# Patient Record
Sex: MALE | Race: WHITE | NOT HISPANIC OR LATINO | Employment: UNEMPLOYED | URBAN - NONMETROPOLITAN AREA
[De-identification: names, ages, dates, MRNs, and addresses within clinical notes are randomized per-mention and may not be internally consistent; named-entity substitution may affect disease eponyms.]

---

## 2017-01-03 ENCOUNTER — OFFICE VISIT (OUTPATIENT)
Dept: PSYCHIATRY | Facility: CLINIC | Age: 72
End: 2017-01-03

## 2017-01-03 DIAGNOSIS — F41.9 ANXIETY DISORDER, UNSPECIFIED TYPE: Primary | ICD-10-CM

## 2017-01-03 PROCEDURE — 90837 PSYTX W PT 60 MINUTES: CPT | Performed by: SOCIAL WORKER

## 2017-01-03 NOTE — MR AVS SNAPSHOT
Rose Singer   1/3/2017 1:45 PM   Office Visit    Dept Phone:  [phone]   Encounter #:  [id number]    Provider:  Tiana Black LCSW   Department:  [facility] BEHAVIORAL HEALTH                Your Full Care Plan              Your Updated Medication List          This list is accurate as of: 1/3/17  4:21 PM.  Always use your most recent med list.                aspirin 325 MG tablet       atorvastatin 10 MG tablet   Commonly known as:  LIPITOR   Take 1 tablet by mouth daily.       cholecalciferol 1000 UNITS tablet   Commonly known as:  VITAMIN D3       clonazePAM 0.5 MG tablet   Commonly known as:  KlonoPIN   Take 1 tablet by mouth 2 (Two) Times a Day As Needed for anxiety.       * FLUoxetine 40 MG capsule   Commonly known as:  PROzac   Take 1 capsule by mouth Daily.       * FLUoxetine 20 MG capsule   Commonly known as:  PROzac   Take 1 capsule by mouth Daily.       glucose blood test strip       Lancets misc       lisinopril 5 MG tablet   Commonly known as:  PRINIVIL,ZESTRIL   Take 1 tablet by mouth Daily.       nitroglycerin 0.4 MG SL tablet   Commonly known as:  NITROSTAT       vitamin B-12 1000 MCG tablet   Commonly known as:  CYANOCOBALAMIN       ZANTAC 300 MG tablet   Generic drug:  raNITIdine       * Notice:  This list has 2 medication(s) that are the same as other medications prescribed for you. Read the directions carefully, and ask your doctor or other care provider to review them with you.            Instructions     None    Patient Instructions History      Upcoming Appointments     Visit Type Date Time Department    PSYCHOTHERAPY 1/3/2017  1:45 PM [facility]    LAB 1/18/2017  8:40 AM [facility] [place]    FOLLOW UP 1/19/2017  2:30 PM [facility] [place]    PSYCHOTHERAPY 1/19/2017  8:30 AM [facility]    MEDICINE CHECK 1/25/2017  9:20 AM [facility]    FOLLOW UP 2/9/2017 10:00 AM [facility] [place] Signup     Our records  indicate that you have an active [facility] [employer] account.    You can view your After Visit Summary by going to [employer] and logging in with your [employer] username and password.  If you don't have a [employer] username and password but a parent or guardian has access to your record, the parent or guardian should login with their own [employer] username and password and access your record to view the After Visit Summary.    If you have questions, you can email [email or web address] or call [phone] to talk to our [employer] staff.  Remember, [employer] is NOT to be used for urgent needs.  For medical emergencies, dial 911.               Other Info from Your Visit           Your Appointments     Jan 18, 2017  8:40 AM EST   Lab with LABWORK, CARD COR   [facility] CARDIOLOGY (--)    [address]  [place] [address]   [phone]            Jan 19, 2017  8:30 AM EST   Psychotherapy with Tiana Black LCSW   [facility] BEHAVIORAL HEALTH (--)    [address]  [place] [address]   [phone]            Jan 19, 2017  2:30 PM EST   Follow Up with Adryan Rodney MD   [facility] CARDIOLOGY (--)    [address]  [place] [address]   [phone]           Arrive 15 minutes prior to appointment.            Jan 25, 2017  9:20 AM EST   Medicine Check with ITALO Alfonso   [facility] BEHAVIORAL HEALTH (--)    [address]  [email or web address] [address]   [phone]            Feb 09, 2017 10:00 AM EST   Follow Up with VY Page MD   [facility] PULMONOLOGY CRITICAL CARE (--)    [address] [address]  [place] [address]   [phone]           Arrive 15 minutes prior to appointment.              Allergies     Penicillins      Sulfa Antibiotics        Vital Signs     Smoking Status                   Former Smoker

## 2017-01-18 ENCOUNTER — LAB (OUTPATIENT)
Dept: CARDIOLOGY | Facility: CLINIC | Age: 72
End: 2017-01-18

## 2017-01-18 DIAGNOSIS — E11.9 TYPE 2 DIABETES MELLITUS WITHOUT COMPLICATION (HCC): ICD-10-CM

## 2017-01-18 DIAGNOSIS — E78.5 HYPERLIPIDEMIA: ICD-10-CM

## 2017-01-18 DIAGNOSIS — I25.10 CORONARY ARTERY DISEASE INVOLVING NATIVE CORONARY ARTERY OF NATIVE HEART WITHOUT ANGINA PECTORIS: ICD-10-CM

## 2017-01-18 LAB
ALBUMIN SERPL-MCNC: 4.3 G/DL (ref 3.4–4.8)
ALBUMIN UR-MCNC: 15.2 MG/L
ALBUMIN/GLOB SERPL: 1.6 G/DL (ref 1.5–2.5)
ALP SERPL-CCNC: 50 U/L (ref 46–116)
ALT SERPL W P-5'-P-CCNC: 24 U/L (ref 10–44)
ANION GAP SERPL CALCULATED.3IONS-SCNC: 5.9 MMOL/L (ref 3.6–11.2)
AST SERPL-CCNC: 22 U/L (ref 10–34)
BASOPHILS # BLD AUTO: 0.03 10*3/MM3 (ref 0–0.3)
BASOPHILS NFR BLD AUTO: 0.4 % (ref 0–2)
BILIRUB SERPL-MCNC: 0.7 MG/DL (ref 0.2–1.8)
BUN BLD-MCNC: 11 MG/DL (ref 7–21)
BUN/CREAT SERPL: 10.9 (ref 7–25)
CALCIUM SPEC-SCNC: 9.8 MG/DL (ref 7.7–10)
CHLORIDE SERPL-SCNC: 107 MMOL/L (ref 99–112)
CHOLEST SERPL-MCNC: 146 MG/DL (ref 0–200)
CK SERPL-CCNC: 76 U/L (ref 24–204)
CO2 SERPL-SCNC: 32.1 MMOL/L (ref 24.3–31.9)
CREAT BLD-MCNC: 1.01 MG/DL (ref 0.43–1.29)
DEPRECATED RDW RBC AUTO: 43 FL (ref 37–54)
EOSINOPHIL # BLD AUTO: 0.16 10*3/MM3 (ref 0–0.7)
EOSINOPHIL NFR BLD AUTO: 2.3 % (ref 0–7)
ERYTHROCYTE [DISTWIDTH] IN BLOOD BY AUTOMATED COUNT: 13.4 % (ref 11.5–14.5)
GFR SERPL CREATININE-BSD FRML MDRD: 73 ML/MIN/1.73
GLOBULIN UR ELPH-MCNC: 2.7 GM/DL
GLUCOSE BLD-MCNC: 136 MG/DL (ref 70–110)
HBA1C MFR BLD: 6 % (ref 4.5–5.7)
HCT VFR BLD AUTO: 46.2 % (ref 42–52)
HDLC SERPL-MCNC: 31 MG/DL (ref 60–100)
HGB BLD-MCNC: 15.8 G/DL (ref 14–18)
IMM GRANULOCYTES # BLD: 0.02 10*3/MM3 (ref 0–0.03)
IMM GRANULOCYTES NFR BLD: 0.3 % (ref 0–0.5)
LDLC SERPL CALC-MCNC: 91 MG/DL (ref 0–100)
LDLC/HDLC SERPL: 2.93 {RATIO}
LYMPHOCYTES # BLD AUTO: 1.36 10*3/MM3 (ref 1–3)
LYMPHOCYTES NFR BLD AUTO: 19.8 % (ref 16–46)
MCH RBC QN AUTO: 30 PG (ref 27–33)
MCHC RBC AUTO-ENTMCNC: 34.2 G/DL (ref 33–37)
MCV RBC AUTO: 87.8 FL (ref 80–94)
MONOCYTES # BLD AUTO: 0.57 10*3/MM3 (ref 0.1–0.9)
MONOCYTES NFR BLD AUTO: 8.3 % (ref 0–12)
NEUTROPHILS # BLD AUTO: 4.73 10*3/MM3 (ref 1.4–6.5)
NEUTROPHILS NFR BLD AUTO: 68.9 % (ref 40–75)
OSMOLALITY SERPL CALC.SUM OF ELEC: 290.2 MOSM/KG (ref 273–305)
PLATELET # BLD AUTO: 162 10*3/MM3 (ref 130–400)
PMV BLD AUTO: 11.8 FL (ref 6–10)
POTASSIUM BLD-SCNC: 4.8 MMOL/L (ref 3.5–5.3)
PROT SERPL-MCNC: 7 G/DL (ref 6–8)
RBC # BLD AUTO: 5.26 10*6/MM3 (ref 4.7–6.1)
SODIUM BLD-SCNC: 145 MMOL/L (ref 135–153)
TRIGL SERPL-MCNC: 121 MG/DL (ref 0–150)
VLDLC SERPL-MCNC: 24.2 MG/DL
WBC NRBC COR # BLD: 6.87 10*3/MM3 (ref 4.5–12.5)

## 2017-01-18 PROCEDURE — 83036 HEMOGLOBIN GLYCOSYLATED A1C: CPT | Performed by: INTERNAL MEDICINE

## 2017-01-18 PROCEDURE — 80061 LIPID PANEL: CPT | Performed by: INTERNAL MEDICINE

## 2017-01-18 PROCEDURE — 82043 UR ALBUMIN QUANTITATIVE: CPT | Performed by: INTERNAL MEDICINE

## 2017-01-18 PROCEDURE — 85025 COMPLETE CBC W/AUTO DIFF WBC: CPT | Performed by: INTERNAL MEDICINE

## 2017-01-18 PROCEDURE — 82550 ASSAY OF CK (CPK): CPT | Performed by: INTERNAL MEDICINE

## 2017-01-18 PROCEDURE — 80053 COMPREHEN METABOLIC PANEL: CPT | Performed by: INTERNAL MEDICINE

## 2017-01-19 ENCOUNTER — OFFICE VISIT (OUTPATIENT)
Dept: CARDIOLOGY | Facility: CLINIC | Age: 72
End: 2017-01-19

## 2017-01-19 ENCOUNTER — OFFICE VISIT (OUTPATIENT)
Dept: PSYCHIATRY | Facility: CLINIC | Age: 72
End: 2017-01-19

## 2017-01-19 VITALS
OXYGEN SATURATION: 98 % | HEART RATE: 64 BPM | DIASTOLIC BLOOD PRESSURE: 76 MMHG | HEIGHT: 71 IN | BODY MASS INDEX: 33.43 KG/M2 | WEIGHT: 238.8 LBS | SYSTOLIC BLOOD PRESSURE: 152 MMHG

## 2017-01-19 DIAGNOSIS — I10 ESSENTIAL HYPERTENSION: ICD-10-CM

## 2017-01-19 DIAGNOSIS — E66.01 MORBID OBESITY DUE TO EXCESS CALORIES (HCC): ICD-10-CM

## 2017-01-19 DIAGNOSIS — E11.9 TYPE 2 DIABETES MELLITUS WITHOUT COMPLICATION, WITHOUT LONG-TERM CURRENT USE OF INSULIN (HCC): ICD-10-CM

## 2017-01-19 DIAGNOSIS — C61 PROSTATE CA (HCC): ICD-10-CM

## 2017-01-19 DIAGNOSIS — E78.2 MIXED HYPERLIPIDEMIA: Primary | ICD-10-CM

## 2017-01-19 DIAGNOSIS — I25.10 CORONARY ARTERY DISEASE INVOLVING NATIVE CORONARY ARTERY OF NATIVE HEART WITHOUT ANGINA PECTORIS: ICD-10-CM

## 2017-01-19 DIAGNOSIS — F41.9 ANXIETY DISORDER, UNSPECIFIED TYPE: Primary | ICD-10-CM

## 2017-01-19 DIAGNOSIS — F32.4 MAJOR DEPRESSIVE DISORDER WITH SINGLE EPISODE, IN PARTIAL REMISSION (HCC): ICD-10-CM

## 2017-01-19 DIAGNOSIS — R00.1 BRADYCARDIA: ICD-10-CM

## 2017-01-19 PROCEDURE — 90837 PSYTX W PT 60 MINUTES: CPT | Performed by: SOCIAL WORKER

## 2017-01-19 PROCEDURE — 99214 OFFICE O/P EST MOD 30 MIN: CPT | Performed by: INTERNAL MEDICINE

## 2017-01-19 RX ORDER — FLUOXETINE HYDROCHLORIDE 40 MG/1
40 CAPSULE ORAL DAILY
Qty: 30 CAPSULE | Refills: 0 | Status: SHIPPED | OUTPATIENT
Start: 2017-01-19 | End: 2017-01-25 | Stop reason: SDUPTHER

## 2017-01-19 RX ORDER — FLUOXETINE HYDROCHLORIDE 20 MG/1
20 CAPSULE ORAL DAILY
Qty: 30 CAPSULE | Refills: 0 | Status: SHIPPED | OUTPATIENT
Start: 2017-01-19 | End: 2017-01-25 | Stop reason: SDUPTHER

## 2017-01-19 RX ORDER — ATORVASTATIN CALCIUM 10 MG/1
10 TABLET, FILM COATED ORAL DAILY
Qty: 90 TABLET | Refills: 1 | Status: SHIPPED | OUTPATIENT
Start: 2017-01-19 | End: 2017-04-17 | Stop reason: DRUGHIGH

## 2017-01-19 RX ORDER — LISINOPRIL 5 MG/1
5 TABLET ORAL DAILY
Qty: 90 TABLET | Refills: 0 | Status: SHIPPED | OUTPATIENT
Start: 2017-01-19 | End: 2017-04-06 | Stop reason: SDUPTHER

## 2017-01-19 NOTE — PROGRESS NOTES
PROGRESS NOTE  Data:  Rose Singer came in 2017 for his regularly scheduled therapy session starting at 8:35am. and ending at 9:30am., [facility]  Pt. Reports depression has improved.     (Scales based on 0 - 10 with 10 being the worst)  Depression: 2 Anxiety: 2   Distress: 2 Sleep: 0   Tasks Completed on Time: 0 Mood: 0   Number of Panic Attacks: 0 Appetite: 3     Patient comes in reporting that he is much better.  Patient states that his mother did pass away 2 weeks ago and he was able to go to the  and be with his family.  Patient reports that it was good that he saw her because it put her him at peace.  Patient reports that he is made some big strides in how he thinks and how he feels.  Patient reports that he was at a point to where he could not walk was having breathing difficulties having heart pain and a lot of negative thinking to now where he is able to get up and down and do a lot of walking because of his daily exercise feeling much better and no heart pains with a lot more positive thinking.  Patient reports that he feels he has gained insight and is reading the purpose driven life.  Patient reports that there is one thing that he continues to feel that he needs to work on and that his resentment.    Clinical Maneuvering/Intervention:  Applied Cognitive therapy and positive coping skills.  Gave praise to patient for his improvement and being willing to get on a daily exercise routine with healthier eating.  Patient was encouraged to process the resentment that he has.  Patient was able to identify that he is a perfectionist and puts a lot of pressure on himself to make everything right.  Patient was encouraged to reframe that thinking to I am good enough and I have done good things.  Patient was encouraged to apply his marcella in letting go and letting God be a part of his letting go of resentment.  Encouraged patient to continue to face his fears by gaining the facts  and applying his marcella.  Encouraged pt. to use the automatic negative  thought worksheet.  Pt. was encouraged to use positive coping skills of sticking to a daily schedule, taking medication as prescribed, getting daily exercise, eating healthy, and applying positive self talk.  Reviewed the crisis safety plan to come to the emergency room if suicidal or homicidal.     Assessment     Patients diagnosis is anxiety disorder unspecified.  Patient having improved anxiety by gaining insight and applying positive coping skills.  Denied Suicidal or Homicidal ideations. Ongoing treatment to prevent decompensation.         Mental Status Exam  Hygiene:  good  Dress:  casual  Attitude:  Cooperative  Motor Activity:  Appropriate  Speech:  Normal  Mood:  within normal limits  Affect:  calm and pleasant  Thought Processes:  Goal directed  Thought Content:  normal  Suicidal Thoughts:  denies  Homicidal Thoughts:  denies  Crisis Safety Plan: yes, to come to the emergency room.  Hallucinations:  denies    Patient's Support Network Includes:  wife and extended family    Plan      is to return in one month for positive coping skills and cognitive therapy.  Patient is to continue to apply positive coping skills of eating healthy, sticking to a daily schedule, getting daily exercise, and taking his medication as prescribed, and applying positive self talk to maintain his stability.  Patient is to continue to work on letting go of his resentment and applying positive self talk with reframing his negative thoughts to positive by questioning the facts and what he would like to believe about himself.  Patient is to continue to focus on living 1 day at a time focusing on the things he can change instead of what he can't which is only himself.       Return in about 1 month (around 2/19/2017).

## 2017-01-19 NOTE — MR AVS SNAPSHOT
Rose Singer   1/19/2017 2:30 PM   Office Visit    Dept Phone:  [phone]   Encounter #:  [id number]    Provider:  Adryan Rodney MD   Department:  [facility] CARDIOLOGY                Your Full Care Plan              Today's Medication Changes          These changes are accurate as of: 1/19/17  3:09 PM.  If you have any questions, ask your nurse or doctor.               Stop taking medication(s)listed here:     clonazePAM 0.5 MG tablet   Commonly known as:  KlonoPIN   Stopped by:  Adryan Rodney MD                Where to Get Your Medications      These medications were sent to [address] [place], [place] - [address] [facility] [address] [facility] - [phone]  - [phone] FX  [address] [facility] [place] [address]     Phone:  [phone]     atorvastatin 10 MG tablet    FLUoxetine 20 MG capsule    FLUoxetine 40 MG capsule    lisinopril 5 MG tablet                  Your Updated Medication List          This list is accurate as of: 1/19/17  3:09 PM.  Always use your most recent med list.                aspirin 325 MG tablet       atorvastatin 10 MG tablet   Commonly known as:  LIPITOR   Take 1 tablet by mouth Daily.       cholecalciferol 1000 UNITS tablet   Commonly known as:  VITAMIN D3       * FLUoxetine 20 MG capsule   Commonly known as:  PROzac   Take 1 capsule by mouth Daily.       * FLUoxetine 40 MG capsule   Commonly known as:  PROzac   Take 1 capsule by mouth Daily.       glucose blood test strip       Lancets misc       lisinopril 5 MG tablet   Commonly known as:  PRINIVIL,ZESTRIL   Take 1 tablet by mouth Daily.       nitroglycerin 0.4 MG SL tablet   Commonly known as:  NITROSTAT       vitamin B-12 1000 MCG tablet   Commonly known as:  CYANOCOBALAMIN       ZANTAC 300 MG tablet   Generic drug:  raNITIdine       * Notice:  This list has 2 medication(s) that are the same as other medications prescribed for you. Read the  directions carefully, and ask your doctor or other care provider to review them with you.            You Were Diagnosed With        Codes Comments    Mixed hyperlipidemia    -  Primary ICD-10-CM: E78.2  ICD-9-CM: 272.2     Essential hypertension     ICD-10-CM: I10  ICD-9-CM: 401.9     Coronary artery disease involving native coronary artery of native heart without angina pectoris     ICD-10-CM: I25.10  ICD-9-CM: 414.01     Bradycardia     ICD-10-CM: R00.1  ICD-9-CM: 427.89     Morbid obesity due to excess calories     ICD-10-CM: E66.01  ICD-9-CM: 278.01     Type 2 diabetes mellitus without complication, without long-term current use of insulin     ICD-10-CM: E11.9  ICD-9-CM: 250.00     Prostate CA     ICD-10-CM: C61  ICD-9-CM: 185       Instructions     None    Patient Instructions History      Upcoming Appointments     Visit Type Date Time Department    FOLLOW UP 1/19/2017  2:30 PM [facility] [place]    PSYCHOTHERAPY 1/19/2017  8:30 AM [facility]    MEDICINE CHECK 1/25/2017  9:20 AM [facility]    FOLLOW UP 2/9/2017 10:00 AM [facility] [place]    PSYCHOTHERAPY 2/16/2017 10:15 AM [facility]    FOLLOW UP 4/6/2017  2:45 PM [facility] [place]      MyChart Signup     Our records indicate that you have an active [employer] account.    You can view your After Visit Summary by going to [employer] and logging in with your [employer] username and password.  If you don't have a [employer] username and password but a parent or guardian has access to your record, the parent or guardian should login with their own [employer] username and password and access your record to view the After Visit Summary.    If you have questions, you can email [email or web address] or call [phone] to talk to our [employer] staff.  Remember, [employer] is NOT to be used for urgent needs.  For medical emergencies, dial 911.               Other Info from Your Visit           Your Appointments     Jan 25,  "2017  9:20 AM EST   Medicine Check with ITALO Alfonso   [facility] BEHAVIORAL HEALTH (--)    [address] [facility] [address]   [phone]            Feb 09, 2017 10:00 AM EST   Follow Up with VY Page MD   [facility] PULMONOLOGY CRITICAL CARE (--)    [address] [address]  [facility] [address] [phone]           Arrive 15 minutes prior to appointment.            Feb 16, 2017 10:15 AM EST   Psychotherapy with Tiana Black LCSW   [facility] BEHAVIORAL HEALTH (--)    [address] [facility] [address]   [phone]            Apr 06, 2017  2:45 PM EDT   Follow Up with Adryan Rodney MD   [facility] CARDIOLOGY (--)    [address]  [facility] [id number] [phone]           Arrive 15 minutes prior to appointment.              Allergies     Penicillins      Sulfa Antibiotics        Reason for Visit     Coronary Artery Disease     Hypertension     Hyperlipidemia           Vital Signs     Blood Pressure Pulse Height Weight Oxygen Saturation Body Mass Index    152/76 (BP Location: Left arm, Patient Position: Sitting) 64 71\" (180.3 cm) 238 lb 12.8 oz (108 kg) 98% 33.31 kg/m2    Smoking Status                   Former Smoker           Problems and Diagnoses Noted     Bradycardia    Coronary heart disease    Diabetes    High blood pressure    High cholesterol or triglycerides    Obesity    Prostate cancer        "

## 2017-01-19 NOTE — MR AVS SNAPSHOT
Rose Singer   1/19/2017 8:30 AM   Office Visit    Dept Phone:  [phone]   Encounter #:  [id number]    Provider:  Tiana Black LCSW   Department:  [facility] BEHAVIORAL HEALTH                Your Full Care Plan              Your Updated Medication List          This list is accurate as of: 1/19/17  9:59 AM.  Always use your most recent med list.                aspirin 325 MG tablet       atorvastatin 10 MG tablet   Commonly known as:  LIPITOR   Take 1 tablet by mouth daily.       cholecalciferol 1000 UNITS tablet   Commonly known as:  VITAMIN D3       clonazePAM 0.5 MG tablet   Commonly known as:  KlonoPIN   Take 1 tablet by mouth 2 (Two) Times a Day As Needed for anxiety.       * FLUoxetine 40 MG capsule   Commonly known as:  PROzac   Take 1 capsule by mouth Daily.       * FLUoxetine 20 MG capsule   Commonly known as:  PROzac   Take 1 capsule by mouth Daily.       glucose blood test strip       Lancets misc       lisinopril 5 MG tablet   Commonly known as:  PRINIVIL,ZESTRIL   Take 1 tablet by mouth Daily.       nitroglycerin 0.4 MG SL tablet   Commonly known as:  NITROSTAT       vitamin B-12 1000 MCG tablet   Commonly known as:  CYANOCOBALAMIN       ZANTAC 300 MG tablet   Generic drug:  raNITIdine       * Notice:  This list has 2 medication(s) that are the same as other medications prescribed for you. Read the directions carefully, and ask your doctor or other care provider to review them with you.            Instructions     None    Patient Instructions History      Upcoming Appointments     Visit Type Date Time Department    FOLLOW UP 1/19/2017  2:30 PM [facility] [place]    PSYCHOTHERAPY 1/19/2017  8:30 AM [facility]    MEDICINE CHECK 1/25/2017  9:20 AM [facility]    FOLLOW UP 2/9/2017 10:00 AM [facility] [place]    PSYCHOTHERAPY 2/16/2017 10:15 AM [facility]      MyChart Signup     Our records indicate that you have an active [facility]  [employer] account.    You can view your After Visit Summary by going to [employer] and logging in with your [employer]inc username and password.  If you don't have a [employer]inc username and password but a parent or guardian has access to your record, the parent or guardian should login with their own [employer]thinc username and password and access your record to view the After Visit Summary.    If you have questions, you can email [email or web address] or call [phone] to talk to our [employer] staff.  Remember, [employer] is NOT to be used for urgent needs.  For medical emergencies, dial 911.               Other Info from Your Visit           Your Appointments     Jan 19, 2017  2:30 PM EST   Follow Up with Adryan Rodney MD   [facility] CARDIOLOGY (--)    [address] [place]  [employer] [place] [address]   [phone]           Arrive 15 minutes prior to appointment.            Jan 25, 2017  9:20 AM EST   Medicine Check with ITALO Alfonso   [facility] BEHAVIORAL HEALTH (--)    [address]  [employer] [place] [address]   [phone]            Feb 09, 2017 10:00 AM EST   Follow Up with VY Page MD   [facility] PULMONOLOGY CRITICAL CARE (--)    [address] [address]  [place] [address]   [phone]           Arrive 15 minutes prior to appointment.            Feb 16, 2017 10:15 AM EST   Psychotherapy with Tiana Black LCSW   [facility] BEHAVIORAL HEALTH (--)    [address]  [place] [address]   [phone]              Allergies     Penicillins      Sulfa Antibiotics        Vital Signs     Smoking Status                   Former Smoker

## 2017-01-19 NOTE — PROGRESS NOTES
subjective     Chief Complaint   Patient presents with   • Coronary Artery Disease   • Hypertension   • Hyperlipidemia     History of Present Illness    Coronary artery disease  Rose Singer has known coronary artery disease as described in the problem list.  Patient is quite stable.  No chest pain, palpitations or shortness of breath.  he has not used any nitroglycerin.  No drug side effects.  he is trying to follow diet also.  he is quite satisfied with the progress.    HYPERTENSION  Rose Singer has long-standing history of essential hypertension.  he is taking medications regularly.  There are no medication side effects.  Blood pressure is very well controlled.  There has been no headache, nausea or chest pain.  There has been no syncopal or presyncopal episode.  he denies episodes of hypo-tension or accelerated hypertension.  Hyperlipidemia  Rose Singer has long-standing history of hyperlipidemia.  Has been trying to lose weight and trying to follow diet and activity recommandations.  Patient is tolerating medications very well.  There has been no side effects.  Latest lipid levels have been fluctuating.  Diabetes Mellitus   Rose Singer has long-standing history of type 2 diabetes mellitus.  Patient is taking oral hypoglycemic agents.  Tolerating medications very well.  he sugar is fluctuating over no episode of hypoglycemia.  No syncope or presyncope.  he is trying to diet and lose weight and exercise.      Prostate cancer  Patient has had prostate cancer status post robot assisted radical prostatectomy June 2013.  There was extracapsular extension with negative surgical margins..  His PSA has been very low and patient is asymptomatic.    Depression  Patient had major depressive episode has been taking 60 mg of Prozac and is doing better.  Patient wants refill of medications which will be given.  Patient was advised to continue following with psychiatric service also.    Tremors  Patient has according to him tremors  he cannot describes if there resting tremors unintentional tremors however intermittently has mild tremors.  Today patient has no tremors.  He is concerned about Parkinson's disease.  And wants to know if there is any tests that can be done.  Patient was told that he could make an appointment with neurologist for him.  Patient at this time wants to wait.    Patient Active Problem List   Diagnosis   • CAD (coronary artery disease) status post myocardial infarction and PCI to the RCA in 2005   • Diabetes mellitus   • Hyperlipidemia   • Depression   • Essential hypertension   • Obesity   • Bradycardia   • Prostate CA       Social History   Substance Use Topics   • Smoking status: Former Smoker     Quit date: 5/18/1991   • Smokeless tobacco: Never Used   • Alcohol use No       Allergies   Allergen Reactions   • Penicillins    • Sulfa Antibiotics          Current Outpatient Prescriptions:   •  aspirin 325 MG tablet, Take 325 mg by mouth daily., Disp: , Rfl:   •  atorvastatin (LIPITOR) 10 MG tablet, Take 1 tablet by mouth Daily., Disp: 90 tablet, Rfl: 1  •  cholecalciferol (VITAMIN D3) 1000 UNITS tablet, Take 1,000 Units by mouth daily., Disp: , Rfl:   •  FLUoxetine (PROzac) 20 MG capsule, Take 1 capsule by mouth Daily., Disp: 30 capsule, Rfl: 0  •  FLUoxetine (PROzac) 40 MG capsule, Take 1 capsule by mouth Daily., Disp: 30 capsule, Rfl: 0  •  glucose blood test strip, 1 each by Other route as needed. Use as instructed, Disp: , Rfl:   •  Lancets misc, , Disp: , Rfl:   •  lisinopril (PRINIVIL,ZESTRIL) 5 MG tablet, Take 1 tablet by mouth Daily., Disp: 90 tablet, Rfl: 0  •  nitroglycerin (NITROSTAT) 0.4 MG SL tablet, Place 0.4 mg under the tongue as needed for chest pain. Take no more than 3 doses in 15 minutes., Disp: , Rfl:   •  ranitidine (ZANTAC) 300 MG tablet, Take 300 mg by mouth every night., Disp: , Rfl:   •  vitamin B-12 (CYANOCOBALAMIN) 1000 MCG tablet, Take 1,000 mcg by mouth daily., Disp: , Rfl:       The  "following portions of the patient's history were reviewed and updated as appropriate: allergies, current medications, past family history, past medical history, past social history, past surgical history and problem list.    Review of Systems   Constitution: Negative.   HENT: Negative.    Eyes: Negative.    Cardiovascular: Negative.    Respiratory: Negative.    Hematologic/Lymphatic: Negative.    Musculoskeletal: Negative.    Gastrointestinal: Negative.    Neurological: Positive for tremors.   Psychiatric/Behavioral: Positive for depression. The patient is nervous/anxious.           Objective:     Visit Vitals   • /76 (BP Location: Left arm, Patient Position: Sitting)   • Pulse 64   • Ht 71\" (180.3 cm)   • Wt 238 lb 12.8 oz (108 kg)   • SpO2 98%   • BMI 33.31 kg/m2     Physical Exam   Constitutional: He appears well-developed and well-nourished.   HENT:   Head: Normocephalic and atraumatic.   Mouth/Throat: Oropharynx is clear and moist.   Eyes: Conjunctivae and EOM are normal. Pupils are equal, round, and reactive to light. No scleral icterus.   Neck: Normal range of motion. Neck supple. No JVD present. No tracheal deviation present. No thyromegaly present.   Cardiovascular: Normal rate, regular rhythm, normal heart sounds and intact distal pulses.  Exam reveals no friction rub.    No murmur heard.  Pulmonary/Chest: Effort normal and breath sounds normal. No respiratory distress. He has no wheezes. He has no rales. He exhibits no tenderness.   Abdominal: Soft. Bowel sounds are normal. He exhibits no distension and no mass. There is no tenderness. There is no rebound and no guarding.   Musculoskeletal: Normal range of motion. He exhibits no edema, tenderness or deformity.   Lymphadenopathy:     He has no cervical adenopathy.   Neurological: He is alert. He has normal reflexes. No cranial nerve deficit. He exhibits normal muscle tone. Coordination normal.   Skin: Skin is warm and dry.   Psychiatric: He has a " normal mood and affect. His behavior is normal. Judgment and thought content normal.         Lab Review  Lab Results   Component Value Date     01/18/2017    K 4.8 01/18/2017     01/18/2017    BUN 11 01/18/2017    CREATININE 1.01 01/18/2017    GLUCOSE 136 (H) 01/18/2017    CALCIUM 9.8 01/18/2017    ALT 24 01/18/2017    ALKPHOS 50 01/18/2017    LABIL2 1.6 01/18/2017     Lab Results   Component Value Date    CKTOTAL 76 01/18/2017     Lab Results   Component Value Date    WBC 6.87 01/18/2017    HGB 15.8 01/18/2017    HCT 46.2 01/18/2017     01/18/2017     No results found for: INR  No results found for: MG  Lab Results   Component Value Date    PSA 0.10 02/10/2016     No results found for: BNP  Lab Results   Component Value Date    CHLPL 143 02/10/2016     Lab Results   Component Value Date    CHOL 146 01/18/2017    TRIG 121 01/18/2017    HDL 31 (L) 01/18/2017    LDLCALC 91 01/18/2017    VLDL 24.2 01/18/2017    LDLHDL 2.93 01/18/2017         Procedures     I personally viewed and interpreted the patient's LAB data         Assessment:     1. Mixed hyperlipidemia    2. Essential hypertension    3. Coronary artery disease involving native coronary artery of native heart without angina pectoris    4. Bradycardia    5. Morbid obesity due to excess calories    6. Type 2 diabetes mellitus without complication, without long-term current use of insulin    7. Prostate CA    8. Major depressive disorder with single episode, in partial remission          Plan:      Lipids are normal patient was advised to continue Lipitor as prescribed  Dietary restrictions and healthy lifestyle was discussed.    Sugar is poorly controlled  Metformin was offered however patient feels that he likes to control it with exercise diet and weight loss.  If patient cannot bring it down by next lab work he would be willing to start some medications.    Coronary artery disease patient is stable without any chest pain or any other cardiac  symptoms.    Obesity   weight loss and lifestyle change and explained to the patient  Prostate cancer patient is doing very well  Blood pressure very well controlled  Depression under control  Tremors  Neurological consult discussed patient wants to wait for now.  Refills were given  Patient's questions were answered satisfactorily  He'll be reevaluated in 3 months.      Return in about 3 months (around 4/19/2017).

## 2017-01-25 ENCOUNTER — OFFICE VISIT (OUTPATIENT)
Dept: PSYCHIATRY | Facility: CLINIC | Age: 72
End: 2017-01-25

## 2017-01-25 DIAGNOSIS — F41.9 ANXIETY DISORDER, UNSPECIFIED TYPE: Primary | ICD-10-CM

## 2017-01-25 PROCEDURE — 99213 OFFICE O/P EST LOW 20 MIN: CPT | Performed by: NURSE PRACTITIONER

## 2017-01-25 RX ORDER — FLUOXETINE HYDROCHLORIDE 20 MG/1
20 CAPSULE ORAL DAILY
Qty: 30 CAPSULE | Refills: 2 | Status: SHIPPED | OUTPATIENT
Start: 2017-01-25 | End: 2017-04-03 | Stop reason: SDUPTHER

## 2017-01-25 RX ORDER — FLUOXETINE HYDROCHLORIDE 40 MG/1
40 CAPSULE ORAL DAILY
Qty: 30 CAPSULE | Refills: 2 | Status: SHIPPED | OUTPATIENT
Start: 2017-01-25 | End: 2017-04-17 | Stop reason: SDUPTHER

## 2017-01-25 NOTE — PROGRESS NOTES
Subjective   Rose Singer is a 71 y.o. male who is here today for medication management follow up at the [facility], he presents with his wife with whom he gives permission to speak openly to.  He presents to his appointment on time.      Chief Complaint:  Follow-up     History of Present Illness  He states that he is feeling better, denies any SE or problems.  He reports that his physical is doing better also which has helped with his concern and worry.  He has become more active walking and exercising.  Depression- 1/10; and anxiety 1/10- states that he doesn't get upset about things anymore.  He is sleeping at least 6 hours per night with no NM, sleeps better when he doesn't have any early more business.  He feels like his appetite is good, he is attempting to eat more healthy.  He denies any new stressors.  He denies any new health issues.  He denies any AV hallucinations, denies any SI/HI.      The following portions of the patient's history were reviewed and updated as appropriate: allergies, current medications, past family history, past medical history, past social history, past surgical history and problem list.    Review of Systems   Constitutional: Negative for appetite change, chills, diaphoresis, fatigue, fever and unexpected weight change.   HENT: Negative for hearing loss, sore throat, trouble swallowing and voice change.    Eyes: Negative for photophobia and visual disturbance.   Respiratory: Negative for cough, chest tightness and shortness of breath.    Cardiovascular: Negative for chest pain and palpitations.   Gastrointestinal: Negative for abdominal pain, constipation, nausea and vomiting.   Endocrine: Negative for cold intolerance and heat intolerance.   Genitourinary: Negative for dysuria and frequency.   Musculoskeletal: Negative for arthralgias, back pain, joint swelling and neck stiffness.   Skin: Negative for color change and wound.   Allergic/Immunologic: Negative for environmental  allergies and immunocompromised state.   Neurological: Negative for dizziness, tremors, seizures, syncope, weakness, light-headedness and headaches.   Hematological: Negative for adenopathy. Does not bruise/bleed easily.       Objective   Physical Exam   Constitutional: He appears well-developed and well-nourished. No distress.   Neurological: He is alert. Coordination and gait normal.   Vitals reviewed.    There were no vitals taken for this visit.   No assistant present to take VS at this time.     Allergies   Allergen Reactions   • Penicillins    • Sulfa Antibiotics        Current Medications:   Current Outpatient Prescriptions   Medication Sig Dispense Refill   • aspirin 325 MG tablet Take 325 mg by mouth daily.     • atorvastatin (LIPITOR) 10 MG tablet Take 1 tablet by mouth Daily. 90 tablet 1   • cholecalciferol (VITAMIN D3) 1000 UNITS tablet Take 1,000 Units by mouth daily.     • FLUoxetine (PROzac) 20 MG capsule Take 1 capsule by mouth Daily. 30 capsule 2   • FLUoxetine (PROzac) 40 MG capsule Take 1 capsule by mouth Daily. 30 capsule 2   • glucose blood test strip 1 each by Other route as needed. Use as instructed     • Lancets misc      • lisinopril (PRINIVIL,ZESTRIL) 5 MG tablet Take 1 tablet by mouth Daily. 90 tablet 0   • nitroglycerin (NITROSTAT) 0.4 MG SL tablet Place 0.4 mg under the tongue as needed for chest pain. Take no more than 3 doses in 15 minutes.     • ranitidine (ZANTAC) 300 MG tablet Take 300 mg by mouth every night.     • vitamin B-12 (CYANOCOBALAMIN) 1000 MCG tablet Take 1,000 mcg by mouth daily.       No current facility-administered medications for this visit.        Mental Status Exam:   Hygiene:   good  Cooperation:  Cooperative  Eye Contact:  Good  Psychomotor Behavior:  Appropriate  Affect:  Appropriate  Hopelessness: Denies  Speech:  Normal  Thought Process:  Goal directed  Thought Content:  Normal  Suicidal:  None  Homicidal:  None  Hallucinations:  None  Delusion:  None  Memory:   Intact  Orientation:  Person, Place, Time and Situation  Reliability:  good  Insight:  Good  Judgement:  Good  Impulse Control:  Good  Physical/Medical Issues:  No     Assessment/Plan     Anxiety disorder, unspecified type    Other orders  -     FLUoxetine (PROzac) 40 MG capsule; Take 1 capsule by mouth Daily.  -     FLUoxetine (PROzac) 20 MG capsule; Take 1 capsule by mouth Daily.      Discused medications options.  Continue prozac to assist with depression, discontine klonopin.  May be prescribed if needed in the future for anxiety.  Continue therapy.   Discussed the risks, beneefits, and side effects of the medications; patient ackowledged and verbally consentedd.  Patient is aware to call the [facility] with any worsening of symptoms.  Patient is agreeable to call 911 or go to the nearest ER should he/she begin having SI/HI.    Return in 12 weeks

## 2017-01-25 NOTE — MR AVS SNAPSHOT
Rose Singer   1/25/2017 9:20 AM   Office Visit    Dept Phone:  [phone]   Encounter #:  [id number]    Provider:  ITALO Alfonso   Department:  [facility]                Your Full Care Plan              Where to Get Your Medications      These medications were sent to [address] [place], [place] - [address] [facility] [address]  [facility] - [phone]  - [phone] FX  [address] [facility] [place] [address]     Phone:  [phone]     FLUoxetine 20 MG capsule    FLUoxetine 40 MG capsule            Your Updated Medication List          This list is accurate as of: 1/25/17  9:31 AM.  Always use your most recent med list.                aspirin 325 MG tablet       atorvastatin 10 MG tablet   Commonly known as:  LIPITOR   Take 1 tablet by mouth Daily.       cholecalciferol 1000 UNITS tablet   Commonly known as:  VITAMIN D3       * FLUoxetine 20 MG capsule   Commonly known as:  PROzac   Take 1 capsule by mouth Daily.       * FLUoxetine 40 MG capsule   Commonly known as:  PROzac   Take 1 capsule by mouth Daily.       glucose blood test strip       Lancets misc       lisinopril 5 MG tablet   Commonly known as:  PRINIVIL,ZESTRIL   Take 1 tablet by mouth Daily.       nitroglycerin 0.4 MG SL tablet   Commonly known as:  NITROSTAT       vitamin B-12 1000 MCG tablet   Commonly known as:  CYANOCOBALAMIN       ZANTAC 300 MG tablet   Generic drug:  raNITIdine       * Notice:  This list has 2 medication(s) that are the same as other medications prescribed for you. Read the directions carefully, and ask your doctor or other care provider to review them with you.            Instructions     None    Patient Instructions History      Upcoming Appointments     Visit Type Date Time Department    MEDICINE CHECK 1/25/2017  9:20 AM [facility]    FOLLOW UP 2/9/2017 10:00 AM [facility] [place]    PSYCHOTHERAPY 2/16/2017 10:15 AM [facility]  [facility]    FOLLOW UP 4/6/2017  2:45 PM [facility] CARDIOLOGY [facility]    MEDICINE CHECK 4/17/2017 12:30 PM [facility]      MyChart Signup     Our records indicate that you have an active [facility] [employer] account.    You can view your After Visit Summary by going to [employer] and logging in with your [employer] username and password.  If you don't have a [employer] username and password but a parent or guardian has access to your record, the parent or guardian should login with their own [employer] username and password and access your record to view the After Visit Summary.    If you have questions, you can email [email or web address] or call [phone] to talk to our [employer] staff.  Remember, [employer] is NOT to be used for urgent needs.  For medical emergencies, dial 911.               Other Info from Your Visit           Your Appointments     Feb 09, 2017 10:00 AM EST   Follow Up with VY Page MD   [facility] PULMONOLOGY CRITICAL CARE (--)    [address] [address]  [place] [address]   [phone]           Arrive 15 minutes prior to appointment.            Feb 16, 2017 10:15 AM EST   Psychotherapy with Tiana Black LCSW   [facility] BEHAVIORAL HEALTH (--)    [address]  [place] [address]   [phone]            Apr 06, 2017  2:45 PM EDT   Follow Up with Adryan Rodney MD   [facility] CARDIOLOGY (--)    [address]  [place] [address]   [phone]           Arrive 15 minutes prior to appointment.            Apr 17, 2017 12:30 PM EDT   Medicine Check with ITALO Alfonso   [facility] BEHAVIORAL HEALTH (--)    [address]  [place] [address]   [phone]              Allergies     Penicillins      Sulfa Antibiotics        Vital Signs     Smoking Status                   Former Smoker

## 2017-02-09 ENCOUNTER — OFFICE VISIT (OUTPATIENT)
Dept: PULMONOLOGY | Facility: CLINIC | Age: 72
End: 2017-02-09

## 2017-02-09 VITALS
BODY MASS INDEX: 32.48 KG/M2 | SYSTOLIC BLOOD PRESSURE: 130 MMHG | TEMPERATURE: 98.3 F | HEART RATE: 68 BPM | OXYGEN SATURATION: 95 % | DIASTOLIC BLOOD PRESSURE: 86 MMHG | WEIGHT: 232 LBS | HEIGHT: 71 IN

## 2017-02-09 DIAGNOSIS — G47.52 RBD (REM BEHAVIORAL DISORDER): Primary | ICD-10-CM

## 2017-02-09 DIAGNOSIS — G47.33 OSA (OBSTRUCTIVE SLEEP APNEA): ICD-10-CM

## 2017-02-09 PROCEDURE — 99214 OFFICE O/P EST MOD 30 MIN: CPT | Performed by: INTERNAL MEDICINE

## 2017-02-09 NOTE — PROGRESS NOTES
Subjective   Rose Singer is a 71 y.o. male who is being seen for Sleep Apnea    History of Present Illness   Mr. Singer returns after a home sleep study.  This gentleman had symptoms consistent with obstructive sleep apnea as well as REM behavior disorder.  We recommended a nocturnal polysomnography to address both of these issues but possibly because of his insurance issues he ended up having a home sleep study.  Today he is here to discuss about the result.  Symptomatically he still continues to have the same symptoms of snoring, fragmented sleep, unrefreshed feeling in the morning time and increased daytime sleepiness.  Since we saw him last time he injured his wrist again by punching a during his sleep.  His wife mentions that he sometimes moves and shakes his upper extremities during sleep that scares her some time.  Patient does not recall these episodes.    Past Medical History   Diagnosis Date   • Anxiety    • CAD (coronary artery disease)    • Caffeine use      2 Cups Daily.    • Depression    • Diabetes mellitus    • Hyperlipidemia    • Hypertension    • Myocardial infarction    • Obesity    • Prostate cancer      Past Surgical History   Procedure Laterality Date   • Other surgical history       Cath Stent Placement 2005 and 2010.   • Prostatectomy       Radical   • Coronary stent placement       Family History   Problem Relation Age of Onset   • Coronary artery disease Other    • Cancer Other      Colon   • Alzheimer's disease Mother    • Cancer Sister    • No Known Problems Brother    • Heart disease Sister       reports that he quit smoking about 25 years ago. He has never used smokeless tobacco. He reports that he does not drink alcohol or use illicit drugs.  Allergies   Allergen Reactions   • Penicillins    • Sulfa Antibiotics            The following portions of the patient's history were reviewed and updated as appropriate: allergies, current medications, past family history, past medical history,  "past social history, past surgical history and problem list.    Review of Systems   Constitutional: Positive for fatigue (with increased daytime sleepiness).   HENT:        Loud snoring   Respiratory: Positive for apnea (Witnessed apnea per family/spouse) and shortness of breath (exhustional).    Cardiovascular: Positive for leg swelling.   Neurological: Positive for headaches.   Psychiatric/Behavioral: Positive for sleep disturbance (Fragmented sleep with unrefreshed feeling in the morning ).        SLEEP: Loud snoring, fragmented sleep, un refreshed feeling in the morning, increased daytime sleepiness    All other systems reviewed and are negative.      Objective   Visit Vitals   • /86 (BP Location: Left arm, Patient Position: Sitting, Cuff Size: Adult)   • Pulse 68   • Temp 98.3 °F (36.8 °C) (Oral)   • Ht 71\" (180.3 cm)   • Wt 232 lb (105 kg)   • SpO2 95%   • BMI 32.36 kg/m2     Physical Exam   Constitutional: He is oriented to person, place, and time. He appears well-developed and well-nourished.   HENT:   Head: Normocephalic and atraumatic.    Crowded oropharynx.   Eyes: EOM are normal. Pupils are equal, round, and reactive to light.   Neck: Normal range of motion. Neck supple.   Thick neck   Cardiovascular: Normal rate and regular rhythm.    Pulmonary/Chest: Effort normal. He has rales.   Bibasilar rales   Abdominal: Soft. Bowel sounds are normal.   Protuberant belly, abdominal obesity   Musculoskeletal: He exhibits edema.   Neurological: He is alert and oriented to person, place, and time.   Skin: Skin is warm and dry.   Psychiatric: He has a normal mood and affect. His behavior is normal.   Nursing note and vitals reviewed.        Radiology:      Lab Results:  CBC  Lab Results   Component Value Date    WBC 6.87 01/18/2017    RBC 5.26 01/18/2017    HGB 15.8 01/18/2017    HCT 46.2 01/18/2017    MCV 87.8 01/18/2017    MCH 30.0 01/18/2017    MCHC 34.2 01/18/2017    RDW 13.4 01/18/2017     01/18/2017 "    NEUTRORELPCT 68.9 01/18/2017    LYMPHORELPCT 19.8 01/18/2017    MONORELPCT 8.3 01/18/2017    EOSRELPCT 2.3 01/18/2017    BASORELPCT 0.4 01/18/2017    NEUTROABS 4.73 01/18/2017    LYMPHSABS 1.36 01/18/2017    MONOSABS 0.57 01/18/2017    EOSABS 0.16 01/18/2017    BASOSABS 0.03 01/18/2017       CMP  Lab Results   Component Value Date     01/18/2017    K 4.8 01/18/2017     01/18/2017    CO2 32.1 (H) 01/18/2017    GLUCOSE 136 (H) 01/18/2017    BUN 11 01/18/2017    CREATININE 1.01 01/18/2017    CALCIUM 9.8 01/18/2017    AST 22 01/18/2017    ALKPHOS 50 01/18/2017    BILITOT 0.7 01/18/2017    ALT 24 01/18/2017    LABIL2 1.6 01/18/2017    LABOSMO 287 02/10/2016    BCR 10.9 01/18/2017    ANIONGAP 5.9 01/18/2017    ALBUMIN 4.30 01/18/2017    PROTEINTOT 7.0 01/18/2017    EGFRCLEARA 76 02/10/2016        Assessment      ICD-10-CM ICD-9-CM   1. RBD (REM behavioral disorder) G47.52 327.42   2. ROD (obstructive sleep apnea) G47.33 327.23                DISCUSSION:  This patient has the symptomatology strongly indicative of obstructive sleep apnea as well as REM behavior disorder.  Interestingly his home sleep study was essentially negative for obstructive sleep apnea.  Because of the fact that we are also suspecting coexisting REM behavior disorder we like to order an in lab nocturnal polysomnography.    I have discussed this with the patient and would try to schedule him for the test as soon as possible.    Plan    Orders Placed This Encounter   Procedures   • Ambulatory Referral to Sleep Medicine                    Angela Page MD, FCCP, FAASM  Pulmonary, Critical Care, and Sleep Medicine

## 2017-02-16 ENCOUNTER — OFFICE VISIT (OUTPATIENT)
Dept: PSYCHIATRY | Facility: CLINIC | Age: 72
End: 2017-02-16

## 2017-02-16 DIAGNOSIS — F41.9 ANXIETY DISORDER, UNSPECIFIED TYPE: Primary | ICD-10-CM

## 2017-02-16 PROCEDURE — 90834 PSYTX W PT 45 MINUTES: CPT | Performed by: SOCIAL WORKER

## 2017-02-16 NOTE — PROGRESS NOTES
"    PROGRESS NOTE  Data:  Rose Singer came in 2/16/2017 for his regularly scheduled therapy session starting at 11:10am. and ending at 11:55am., with Tiana Black, LCSWLCADLIA .  Pt. Reports anxiety has improved.     (Scales based on 0 - 10 with 10 being the worst)  Depression: 1 Anxiety: 1   Distress: 1 Sleep: 1   Tasks Completed on Time: 1 Mood: 1   Number of Panic Attacks: 0 Appetite: 1     Patient comes in stating \"Well I think I've been good. My wife had enough confidence in me coming by myself. I was 228 lbs today, and I have lost weight since first coming here. I'm doing my walking and all that. Got a great report from my heart doctor. The one thing that really concerned me about my heart was the chest pains. Every time I take a shower and get ready to leave the house, I had been sweating through my shirt. The heart doctor reported it was not a heart attack I was experiencing. I recommended I take a cooler shower in the morning time. Anxiety, I'm not totally comfortable with that just yet. I'm the type of person who, if you make a comment to me that's sarcastic, I will fire back. That's why I've had difficulty at work in the past and would quit jobs. If I think somebody's a-messing with me, I will fire back. I'm still that way. As far as anxiety that is the only thing I notice myself and Penny notices. 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 I have done two funerals since seeing you last. I got through them great. I didn't get off on any stories. I stayed and mingled with people after the grave-side service even. The people there had no idea what I had been able to accomplish by doing that. I'm sleeping. Occasionally, I may start singing or something. I'm not doing the tossing and turning anymore. The sheets aren't all a mess when I wake. My thinking is sharper. I know my purpose for going. My memory had improved. I'm back to reading " "my books and things. Any spare moment. I want to go fishing so bad I can't hardly stand it. But, the boat's in storage. I need to call my son to get it out. We took a trip yesterday way back off the main road like we used to do. We decided yesterday we would look for an older [id number] vehicle so we could take more scenic drives. Something for me to piddle with. 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 And I want to visit more people now. \"    The cruise? We are looking at it. Matter of fact, yesterday we looked at one out of [place]. We'd like to get some family members to go.     Getting out at night time? Well (hesitancy). I made a comment the other day about leaving to go somewhere and getting back before dark. My mother's death is still hard on me. We went to [place] on Valentine's Day, and I was looking at some pictures in my iPhone and there was mommy. It was not easy.     Resentment? It's to a point that it's not bothering me. But, the resent is still there. I still have some feelings. But as far as it keeping me awake, I don't go to bed with fear and guilt no more. The only thing I remain concerned about, which I've turned it over to God, is the cancer. I don't want to do radiation. But, I've even considered it as a possibility now. And I've went on with my life, but I don't dwell on it anymore. I've still got probably two months before I find out anything. I don't think it would have bothered me as bad if I hadn't seen so many things, illnesses and things, in the past.     Walking every other day, 2-and-a-half-miles.     Not yet ready to conclude therapy appointments, particularly with the cancer diagnosis results anticipated in 2 months. Recognizing also the improvements made in the past months. My conversations and talking have improved. No longer withdrawn from others.     Clinical Maneuvering/Intervention:  Applied Cognitive therapy and positive " coping skills.  Allow patient much ventilation of the above content.  Normalized patient's feelings.  Gave praise to patient for his progress.  Attempted to address patient's fear of night time but he was not ready to deal with that  this session.  Encouraged pt. to use the automatic negative  thought worksheet.  Pt. was encouraged to use positive coping skills of sticking to a daily schedule, taking medication as prescribed, getting daily exercise, eating healthy, and applying positive self talk.  Reviewed the crisis safety plan to come to the emergency room if suicidal or homicidal.     Assessment     Patient's diagnosis is anxiety disorder unspecified.  Patient having improved anxiety and improved coping skills.  Denied Suicidal or Homicidal ideations. Ongoing treatment to prevent decompensation.         Mental Status Exam  Hygiene:  good  Dress:  casual  Attitude:  Cooperative  Motor Activity:  Appropriate  Speech:  Normal  Mood:  within normal limits  Affect:  calm and pleasant  Thought Processes:  Goal directed  Thought Content:  normal  Suicidal Thoughts:  denies  Homicidal Thoughts:  denies  Crisis Safety Plan: yes, to come to the emergency room.  Hallucinations:  denies    Patient's Support Network Includes:  wife and extended family    Plan     Patient is to return in 2 months for positive coping skills and cognitive therapy.  Patient is to continue to apply positive coping skills of eating healthy, sticking to a daily schedule, getting daily exercise, and applying positive self talk, and taking his medication as prescribed to maintain his stability.  Patient will continue to focus on living 1 day at a time focusing on the things he can change instead of what he can't which is only himself.  Patient will continue to face his fears by expressing his feelings.         Return in about 2 months (around 4/16/2017).

## 2017-02-28 ENCOUNTER — TELEPHONE (OUTPATIENT)
Dept: CARDIOLOGY | Facility: CLINIC | Age: 72
End: 2017-02-28

## 2017-02-28 RX ORDER — RANITIDINE 300 MG/1
300 TABLET ORAL NIGHTLY
Qty: 90 TABLET | Refills: 1 | Status: SHIPPED | OUTPATIENT
Start: 2017-02-28 | End: 2017-12-04 | Stop reason: SDUPTHER

## 2017-02-28 NOTE — TELEPHONE ENCOUNTER
----- Message from Aniya Landers sent at 2/28/2017  9:19 AM EST -----  Regarding: REFILL   [facility]  RANITIDINE 300 MG QD

## 2017-04-03 ENCOUNTER — TELEPHONE (OUTPATIENT)
Dept: CARDIOLOGY | Facility: CLINIC | Age: 72
End: 2017-04-03

## 2017-04-03 RX ORDER — FLUOXETINE HYDROCHLORIDE 20 MG/1
20 CAPSULE ORAL DAILY
Qty: 30 CAPSULE | Refills: 2 | Status: SHIPPED | OUTPATIENT
Start: 2017-04-03 | End: 2017-04-17 | Stop reason: SDUPTHER

## 2017-04-03 NOTE — TELEPHONE ENCOUNTER
----- Message from Elida Peña sent at 4/3/2017 11:36 AM EDT -----   prozac  20 mg 1 qd  [facility]

## 2017-04-06 ENCOUNTER — OFFICE VISIT (OUTPATIENT)
Dept: CARDIOLOGY | Facility: CLINIC | Age: 72
End: 2017-04-06

## 2017-04-06 VITALS
WEIGHT: 221.8 LBS | HEIGHT: 71 IN | DIASTOLIC BLOOD PRESSURE: 78 MMHG | OXYGEN SATURATION: 97 % | HEART RATE: 63 BPM | SYSTOLIC BLOOD PRESSURE: 122 MMHG | BODY MASS INDEX: 31.05 KG/M2

## 2017-04-06 DIAGNOSIS — E66.01 MORBID OBESITY DUE TO EXCESS CALORIES (HCC): ICD-10-CM

## 2017-04-06 DIAGNOSIS — C61 PROSTATE CA (HCC): ICD-10-CM

## 2017-04-06 DIAGNOSIS — I10 ESSENTIAL HYPERTENSION: ICD-10-CM

## 2017-04-06 DIAGNOSIS — R00.1 BRADYCARDIA: ICD-10-CM

## 2017-04-06 DIAGNOSIS — I25.10 CORONARY ARTERY DISEASE INVOLVING NATIVE CORONARY ARTERY OF NATIVE HEART WITHOUT ANGINA PECTORIS: Primary | ICD-10-CM

## 2017-04-06 DIAGNOSIS — E11.9 TYPE 2 DIABETES MELLITUS WITHOUT COMPLICATION, WITHOUT LONG-TERM CURRENT USE OF INSULIN (HCC): ICD-10-CM

## 2017-04-06 DIAGNOSIS — E78.2 MIXED HYPERLIPIDEMIA: ICD-10-CM

## 2017-04-06 PROCEDURE — 99214 OFFICE O/P EST MOD 30 MIN: CPT | Performed by: INTERNAL MEDICINE

## 2017-04-06 RX ORDER — LISINOPRIL 5 MG/1
5 TABLET ORAL DAILY
Qty: 90 TABLET | Refills: 0 | Status: SHIPPED | OUTPATIENT
Start: 2017-04-06 | End: 2017-07-17 | Stop reason: SDUPTHER

## 2017-04-06 RX ORDER — ATORVASTATIN CALCIUM 20 MG/1
10 TABLET, FILM COATED ORAL
COMMUNITY
Start: 2017-02-07 | End: 2018-03-19 | Stop reason: CLARIF

## 2017-04-06 NOTE — PROGRESS NOTES
subjective     Chief Complaint   Patient presents with   • Follow-up   • Hyperlipidemia   • Hypertension     History of Present Illness    Coronary artery disease  Rose Singer has known coronary artery disease as described in the problem list. Patient is quite stable. No chest pain, palpitations or shortness of breath. he has not used any nitroglycerin. No drug side effects. he is trying to follow diet also. he is quite satisfied with the progress.     HYPERTENSION  Rose Singer has long-standing history of essential hypertension. he is taking medications regularly. There are no medication side effects. Blood pressure is very well controlled. There has been no headache, nausea or chest pain. There has been no syncopal or presyncopal episode. he denies episodes of hypo-tension or accelerated hypertension.    Hyperlipidemia  Rose Singer has long-standing history of hyperlipidemia. Has been trying to lose weight and trying to follow diet and activity recommandations. Patient is tolerating medications very well. There has been no side effects. Latest lipid levels normal.    Diabetes Mellitus   Rose Singer has long-standing history of type 2 diabetes mellitus. Patient is taking oral hypoglycemic agents. Tolerating medications very well. he sugar is fluctuating over no episode of hypoglycemia. No syncope or presyncope. he is trying to diet and lose weight and exercise.  Last blood sugar 136     Prostate cancer  Patient has had prostate cancer status post robot assisted radical prostatectomy June 2013. There was extracapsular extension with negative surgical margins.. His PSA has been very low and patient is asymptomatic.     Depression  Patient had major depressive episode has been taking 60 mg of Prozac and is doing better. Patient wants refill of medications which will be given. Patient was advised to continue following with psychiatric service also.     Tremors  Patient has according to him tremors he cannot describes if  there resting tremors unintentional tremors however intermittently has mild tremors. Today patient has no tremors. He is concerned about Parkinson's disease. And wants to know if there is any tests that can be done. Patient was told that he could make an appointment with neurologist for him. Patient at this time wants to wait.     Patient Active Problem List   Diagnosis   • CAD (coronary artery disease) status post myocardial infarction and PCI to the RCA in 2005   • Diabetes mellitus   • Hyperlipidemia   • Depression   • Essential hypertension   • Obesity   • Bradycardia   • Prostate CA       Social History   Substance Use Topics   • Smoking status: Former Smoker     Quit date: 5/18/1991   • Smokeless tobacco: Never Used   • Alcohol use No       Allergies   Allergen Reactions   • Penicillins    • Sulfa Antibiotics          Current Outpatient Prescriptions:   •  aspirin 325 MG tablet, Take 325 mg by mouth daily., Disp: , Rfl:   •  atorvastatin (LIPITOR) 10 MG tablet, Take 1 tablet by mouth Daily., Disp: 90 tablet, Rfl: 1  •  cholecalciferol (VITAMIN D3) 1000 UNITS tablet, Take 1,000 Units by mouth daily., Disp: , Rfl:   •  FLUoxetine (PROzac) 20 MG capsule, Take 1 capsule by mouth Daily., Disp: 30 capsule, Rfl: 2  •  FLUoxetine (PROzac) 40 MG capsule, Take 1 capsule by mouth Daily., Disp: 30 capsule, Rfl: 2  •  glucose blood test strip, 1 each by Other route as needed. Use as instructed, Disp: , Rfl:   •  Lancets misc, , Disp: , Rfl:   •  lisinopril (PRINIVIL,ZESTRIL) 5 MG tablet, Take 1 tablet by mouth Daily., Disp: 90 tablet, Rfl: 0  •  nitroglycerin (NITROSTAT) 0.4 MG SL tablet, Place 0.4 mg under the tongue as needed for chest pain. Take no more than 3 doses in 15 minutes., Disp: , Rfl:   •  raNITIdine (ZANTAC) 300 MG tablet, Take 1 tablet by mouth Every Night., Disp: 90 tablet, Rfl: 1  •  vitamin B-12 (CYANOCOBALAMIN) 1000 MCG tablet, Take 1,000 mcg by mouth daily., Disp: , Rfl:   •  atorvastatin (LIPITOR) 20  "MG tablet, , Disp: , Rfl:       The following portions of the patient's history were reviewed and updated as appropriate: allergies, current medications, past family history, past medical history, past social history, past surgical history and problem list.    Review of Systems   Constitution: Negative.   HENT: Negative.    Eyes: Negative.    Cardiovascular: Negative.    Respiratory: Negative.    Hematologic/Lymphatic: Negative.    Musculoskeletal: Negative.    Gastrointestinal: Negative.    Neurological: Positive for tremors.          Objective:     /78 (BP Location: Left arm, Patient Position: Sitting)  Pulse 63  Ht 71\" (180.3 cm)  Wt 221 lb 12.8 oz (101 kg)  SpO2 97%  BMI 30.93 kg/m2  Physical Exam   Constitutional: He appears well-developed and well-nourished.   HENT:   Head: Normocephalic and atraumatic.   Mouth/Throat: Oropharynx is clear and moist.   Eyes: Conjunctivae and EOM are normal. Pupils are equal, round, and reactive to light. No scleral icterus.   Neck: Normal range of motion. Neck supple. No JVD present. No tracheal deviation present. No thyromegaly present.   Cardiovascular: Normal rate, regular rhythm, normal heart sounds and intact distal pulses.  Exam reveals no friction rub.    No murmur heard.  Pulmonary/Chest: Effort normal and breath sounds normal. No respiratory distress. He has no wheezes. He has no rales. He exhibits no tenderness.   Abdominal: Soft. Bowel sounds are normal. He exhibits no distension and no mass. There is no tenderness. There is no rebound and no guarding.   Musculoskeletal: Normal range of motion. He exhibits no edema, tenderness or deformity.   Lymphadenopathy:     He has no cervical adenopathy.   Neurological: He is alert. He has normal reflexes. No cranial nerve deficit. He exhibits normal muscle tone. Coordination normal.   Skin: Skin is warm and dry.   Psychiatric: He has a normal mood and affect. His behavior is normal. Judgment and thought content " normal.         Lab Review  Lab Results   Component Value Date     01/18/2017    K 4.8 01/18/2017     01/18/2017    BUN 11 01/18/2017    CREATININE 1.01 01/18/2017    GLUCOSE 136 (H) 01/18/2017    CALCIUM 9.8 01/18/2017    ALT 24 01/18/2017    ALKPHOS 50 01/18/2017    LABIL2 1.6 01/18/2017     Lab Results   Component Value Date    CKTOTAL 76 01/18/2017     Lab Results   Component Value Date    WBC 6.87 01/18/2017    HGB 15.8 01/18/2017    HCT 46.2 01/18/2017     01/18/2017     No results found for: INR  No results found for: MG  Lab Results   Component Value Date    PSA 0.10 02/10/2016     No results found for: BNP  Lab Results   Component Value Date    CHLPL 143 02/10/2016     Lab Results   Component Value Date    CHOL 146 01/18/2017    TRIG 121 01/18/2017    HDL 31 (L) 01/18/2017    LDLCALC 91 01/18/2017    VLDL 24.2 01/18/2017    LDLHDL 2.93 01/18/2017         Procedures     I personally viewed and interpreted the patient's LAB data         Assessment:     1. Coronary artery disease involving native coronary artery of native heart without angina pectoris    2. Mixed hyperlipidemia    3. Essential hypertension    4. Bradycardia    5. Morbid obesity due to excess calories    6. Type 2 diabetes mellitus without complication, without long-term current use of insulin    7. Prostate CA          Plan:      Coronary artery disease status post myocardial infarction and PCI to the RCA 2005.  Patient is completely asymptomatic no change in therapy needed.    Hyperlipidemia very well controlled patient will take Lipitor 20 mg daily.    Blood sugar is not very well controlled.  Patient however does not wish to take any medication at this time he will check his sugar very closely and try to diet.    Blood pressure is controlled  Overall patient is doing very well he has prostatic CA and is stable.  Anxiety is controlled with Prozac.  He will continue rest of his medication.  Refills were given.  Lab work  scheduled for next visit  Aggressive risk factor modification discussed          No Follow-up on file.

## 2017-04-07 ENCOUNTER — HOSPITAL ENCOUNTER (OUTPATIENT)
Dept: HOSPITAL 79 - SLEEP-COR | Age: 72
End: 2017-04-07
Attending: INTERNAL MEDICINE
Payer: MEDICARE

## 2017-04-07 DIAGNOSIS — G47.52: ICD-10-CM

## 2017-04-07 DIAGNOSIS — G47.33: Primary | ICD-10-CM

## 2017-04-17 ENCOUNTER — OFFICE VISIT (OUTPATIENT)
Dept: PSYCHIATRY | Facility: CLINIC | Age: 72
End: 2017-04-17

## 2017-04-17 VITALS
BODY MASS INDEX: 30.66 KG/M2 | HEIGHT: 71 IN | DIASTOLIC BLOOD PRESSURE: 76 MMHG | WEIGHT: 219 LBS | HEART RATE: 58 BPM | SYSTOLIC BLOOD PRESSURE: 129 MMHG

## 2017-04-17 DIAGNOSIS — F41.9 ANXIETY DISORDER, UNSPECIFIED TYPE: Primary | ICD-10-CM

## 2017-04-17 PROCEDURE — 99213 OFFICE O/P EST LOW 20 MIN: CPT | Performed by: NURSE PRACTITIONER

## 2017-04-17 RX ORDER — FLUOXETINE HYDROCHLORIDE 20 MG/1
20 CAPSULE ORAL DAILY
Qty: 30 CAPSULE | Refills: 2 | Status: SHIPPED | OUTPATIENT
Start: 2017-04-17 | End: 2017-06-22 | Stop reason: SDUPTHER

## 2017-04-17 RX ORDER — METOPROLOL SUCCINATE 25 MG/1
TABLET, EXTENDED RELEASE ORAL
COMMUNITY
Start: 2017-01-08 | End: 2018-03-19

## 2017-04-17 RX ORDER — FLUOXETINE HYDROCHLORIDE 40 MG/1
40 CAPSULE ORAL DAILY
Qty: 30 CAPSULE | Refills: 2 | Status: SHIPPED | OUTPATIENT
Start: 2017-04-17 | End: 2017-06-22 | Stop reason: SDUPTHER

## 2017-04-17 NOTE — PROGRESS NOTES
Subjective   Rose Singer is a 71 y.o. male who is here today for medication management follow up at the [facility], he presents to his appointment on time.      Chief Complaint:  Follow-up     History of Present Illness Patient states that he is doing well, denies any issues with his medications, denies any SE.  He states that he believes that he has the depression and anxiety under control especially with the prozac.  He currently denies any symptoms.  Reports that he is sleeping without any problems, getting between 6-8 hours per night.  He is eating well, has lost 13 pounds since February- staets that he is watching what he eats and exercising on a regular basis.  He jessy any new stressors, spends his days with his grandchildren and his wife.  Reports that he recently saw 3 specialist who report that his cancer is stable and he doesn't have to return until October to monitor.  This has been a big relief for the patient.  Only issues is that he is waiting on the results of his sleep study that had to be performed 3 times, should know something by next week.  He denies any AV halluccinations, jessy any SI/HI.  He plans on having Dr Rodney write his prozac RX- therefore patient is made aware of services and may return if needed.  He may see his therapist one more time but it is questionable.     The following portions of the patient's history were reviewed and updated as appropriate: allergies, current medications, past family history, past medical history, past social history, past surgical history and problem list.    Review of Systems   Constitutional: Negative for appetite change, chills, diaphoresis, fatigue, fever and unexpected weight change.   HENT: Negative for hearing loss, sore throat, trouble swallowing and voice change.    Eyes: Negative for photophobia and visual disturbance.   Respiratory: Negative for cough, chest tightness and shortness of breath.    Cardiovascular: Negative for chest pain  and palpitations.   Gastrointestinal: Negative for abdominal pain, constipation, nausea and vomiting.   Endocrine: Negative for cold intolerance and heat intolerance.   Genitourinary: Negative for dysuria and frequency.   Musculoskeletal: Negative for arthralgias, back pain, joint swelling and neck stiffness.   Skin: Negative for color change and wound.   Allergic/Immunologic: Negative for environmental allergies and immunocompromised state.   Neurological: Negative for dizziness, tremors, seizures, syncope, weakness, light-headedness and headaches.   Hematological: Negative for adenopathy. Does not bruise/bleed easily.       Objective   Physical Exam   Constitutional: He appears well-developed and well-nourished. No distress.   Neurological: He is alert. Coordination and gait normal.   Vitals reviewed.    There were no vitals taken for this visit.     Allergies   Allergen Reactions   • Penicillins    • Sulfa Antibiotics        Current Medications:   Current Outpatient Prescriptions   Medication Sig Dispense Refill   • aspirin 325 MG tablet Take 325 mg by mouth daily.     • atorvastatin (LIPITOR) 10 MG tablet Take 1 tablet by mouth Daily. 90 tablet 1   • atorvastatin (LIPITOR) 20 MG tablet      • cholecalciferol (VITAMIN D3) 1000 UNITS tablet Take 1,000 Units by mouth daily.     • FLUoxetine (PROzac) 20 MG capsule Take 1 capsule by mouth Daily. 30 capsule 2   • FLUoxetine (PROzac) 40 MG capsule Take 1 capsule by mouth Daily. 30 capsule 2   • glucose blood test strip 1 each by Other route as needed. Use as instructed     • Lancets misc      • lisinopril (PRINIVIL,ZESTRIL) 5 MG tablet Take 1 tablet by mouth Daily. 90 tablet 0   • nitroglycerin (NITROSTAT) 0.4 MG SL tablet Place 0.4 mg under the tongue as needed for chest pain. Take no more than 3 doses in 15 minutes.     • raNITIdine (ZANTAC) 300 MG tablet Take 1 tablet by mouth Every Night. 90 tablet 1   • vitamin B-12 (CYANOCOBALAMIN) 1000 MCG tablet Take 1,000 mcg  by mouth daily.       No current facility-administered medications for this visit.        Mental Status Exam:   Hygiene:   good  Cooperation:  Cooperative  Eye Contact:  Good  Psychomotor Behavior:  Appropriate  Affect:  Appropriate  Hopelessness: Denies  Speech:  Normal  Thought Process:  Goal directed  Thought Content:  Normal  Suicidal:  None  Homicidal:  None  Hallucinations:  None  Delusion:  None  Memory:  Intact  Orientation:  Person, Place, Time and Situation  Reliability:  good  Insight:  Good  Judgement:  Good  Impulse Control:  Good  Physical/Medical Issues:  No     Assessment/Plan     Anxiety disorder, unspecified type    Other orders  -     FLUoxetine (PROzac) 40 MG capsule; Take 1 capsule by mouth Daily.  -     FLUoxetine (PROzac) 20 MG capsule; Take 1 capsule by mouth Daily.      Discused medications options.  Continue prozac to assist with depression, discontine klonopin.  May be prescribed if needed in the future for anxiety.  Continue therapy.   Discussed the risks, beneefits, and side effects of the medications; patient ackowledged and verbally consentedd.  Patient is aware to call the [facility] with any worsening of symptoms.  Patient is agreeable to call 911 or go to the nearest ER should he/she begin having SI/HI.    Return in 12 weeks

## 2017-04-25 ENCOUNTER — OFFICE VISIT (OUTPATIENT)
Dept: PULMONOLOGY | Facility: CLINIC | Age: 72
End: 2017-04-25

## 2017-04-25 VITALS
TEMPERATURE: 98.1 F | OXYGEN SATURATION: 95 % | WEIGHT: 219 LBS | SYSTOLIC BLOOD PRESSURE: 124 MMHG | HEIGHT: 71 IN | HEART RATE: 74 BPM | DIASTOLIC BLOOD PRESSURE: 70 MMHG | BODY MASS INDEX: 30.66 KG/M2

## 2017-04-25 DIAGNOSIS — G47.34 NOCTURNAL HYPOXEMIA: Primary | ICD-10-CM

## 2017-04-25 DIAGNOSIS — G47.61 PERIODIC LIMB MOVEMENTS OF SLEEP: ICD-10-CM

## 2017-04-25 DIAGNOSIS — G25.81 RESTLESS LEG SYNDROME: ICD-10-CM

## 2017-04-25 PROCEDURE — 99214 OFFICE O/P EST MOD 30 MIN: CPT | Performed by: INTERNAL MEDICINE

## 2017-04-25 RX ORDER — ROPINIROLE 1 MG/1
1 TABLET, FILM COATED ORAL NIGHTLY
Qty: 30 TABLET | Refills: 6 | Status: SHIPPED | OUTPATIENT
Start: 2017-04-25 | End: 2017-06-22

## 2017-04-25 NOTE — PROGRESS NOTES
Subjective   Rose Singer is a 71 y.o. male who is being seen for RBD    History of Present Illness   Patient returns for evaluation of sleep disturbance.  He had a nocturnal polysomnography since we saw him last time.  Symptomatically he still remains the same.  His wife tells me that he is still fighting during sleep, most a lot kicks and punches.  Patient tells me that he still remains very was fatigued and tired throughout the day.    Past Medical History:   Diagnosis Date   • Anxiety    • CAD (coronary artery disease)    • Caffeine use     2 Cups Daily.    • Depression    • Diabetes mellitus    • Hyperlipidemia    • Hypertension    • Myocardial infarction    • Obesity    • Prostate cancer      Past Surgical History:   Procedure Laterality Date   • CORONARY STENT PLACEMENT     • OTHER SURGICAL HISTORY      Cath Stent Placement 2005 and 2010.   • PROSTATECTOMY      Radical     Family History   Problem Relation Age of Onset   • Coronary artery disease Other    • Cancer Other      Colon   • Alzheimer's disease Mother    • Cancer Sister    • No Known Problems Brother    • Heart disease Sister       reports that he quit smoking about 25 years ago. He has never used smokeless tobacco. He reports that he does not drink alcohol or use illicit drugs.  Allergies   Allergen Reactions   • Penicillins    • Sulfa Antibiotics            The following portions of the patient's history were reviewed and updated as appropriate: allergies, current medications, past family history, past medical history, past social history, past surgical history and problem list.    Review of Systems   Constitutional: Negative for appetite change, chills, diaphoresis and unexpected weight change.   HENT: Negative for sore throat, trouble swallowing and voice change.    Eyes: Negative for visual disturbance.   Respiratory: Negative for apnea, cough, choking, shortness of breath and wheezing.    Cardiovascular: Negative for chest pain, palpitations and  "leg swelling.   Gastrointestinal: Negative for abdominal pain, constipation, diarrhea, nausea and vomiting.   Endocrine: Negative for cold intolerance, heat intolerance, polydipsia, polyphagia and polyuria.   Genitourinary: Negative for difficulty urinating and dysuria.   Musculoskeletal: Negative for gait problem.   Skin: Negative for rash and wound.   Neurological: Negative for syncope and light-headedness.   Hematological: Negative for adenopathy.   Psychiatric/Behavioral: Negative for agitation, behavioral problems and confusion.   All other systems reviewed and are negative.      Objective   /70 (BP Location: Left arm, Patient Position: Sitting, Cuff Size: Adult)  Pulse 74  Temp 98.1 °F (36.7 °C) (Oral)   Ht 71\" (180.3 cm)  Wt 219 lb (99.3 kg)  SpO2 95%  BMI 30.54 kg/m2  Physical Exam   Constitutional: He is oriented to person, place, and time.   HENT:   Head: Normocephalic and atraumatic.   Nose: Mucosal edema present.   Eyes: EOM are normal. Pupils are equal, round, and reactive to light.   Neck: Neck supple.   Cardiovascular: Normal rate, regular rhythm and normal heart sounds.    Pulmonary/Chest: He has rhonchi.   Vesicular breath sound bilaterally with prolonged expiratory phase   Abdominal: Soft. Bowel sounds are normal.   Musculoskeletal: Normal range of motion. He exhibits no deformity.   Neurological: He is alert and oriented to person, place, and time.   Skin: Skin is warm and dry.   Psychiatric: He has a normal mood and affect. His behavior is normal.   Nursing note and vitals reviewed.        Radiology:      Lab Results:  CBC  Lab Results   Component Value Date    WBC 6.87 01/18/2017    RBC 5.26 01/18/2017    HGB 15.8 01/18/2017    HCT 46.2 01/18/2017    MCV 87.8 01/18/2017    MCH 30.0 01/18/2017    MCHC 34.2 01/18/2017    RDW 13.4 01/18/2017     01/18/2017    NEUTRORELPCT 68.9 01/18/2017    LYMPHORELPCT 19.8 01/18/2017    MONORELPCT 8.3 01/18/2017    EOSRELPCT 2.3 01/18/2017    " BASORELPCT 0.4 01/18/2017    NEUTROABS 4.73 01/18/2017    LYMPHSABS 1.36 01/18/2017    MONOSABS 0.57 01/18/2017    EOSABS 0.16 01/18/2017    BASOSABS 0.03 01/18/2017       CMP  Lab Results   Component Value Date     01/18/2017    K 4.8 01/18/2017     01/18/2017    CO2 32.1 (H) 01/18/2017    GLUCOSE 136 (H) 01/18/2017    BUN 11 01/18/2017    CREATININE 1.01 01/18/2017    CALCIUM 9.8 01/18/2017    AST 22 01/18/2017    ALKPHOS 50 01/18/2017    BILITOT 0.7 01/18/2017    ALT 24 01/18/2017    LABIL2 1.6 01/18/2017    LABOSMO 287 02/10/2016    BCR 10.9 01/18/2017    ANIONGAP 5.9 01/18/2017    ALBUMIN 4.30 01/18/2017    PROTEINTOT 7.0 01/18/2017    EGFRCLEARA 76 02/10/2016        Assessment      ICD-10-CM ICD-9-CM   1. Nocturnal hypoxemia G47.34 327.24   2. Restless leg syndrome G25.81 333.94   3. Periodic limb movements of sleep G47.61 327.51                DISCUSSION:    I have reviewed the polysomnography report.  Patient had total 16 episodes of apnea hypopneas with an index of 4.1.  But he had significant oxygen desaturation, lowest O2 sat was recorded as 79%.  Interestingly it seems like patient had frequent arousals, arousal index was 24.  Additionally he had a PLM index of 4.9.  This was a preliminary report and I do not see any mention of REM behavior disorder here.  But we would call and get the full report and examined that to see if there is any evidence of REM behavior disorder.    I'm worried about the significant oxygen desaturation.  Patient has congestive heart failure and this may have contributed to someone degree.  I'm also wondering whether he has an underlying obstructive airways disease contributing to this.  A pulmonary function test would give us an objective assessment of his airways function.  We would do a formal nocturnal pulse oximetry test to see the extent of oxygen desaturation and also to see whether he would qualify for oxygen therapy are not.  Patient and his wife appears very  much frustrated about the fact that he does not feel fresh in the morning time.  If he qualifies for oxygen that certainly would help this condition.  I also like to treat his symptoms of restless leg syndrome with Requip 1 mg daily at bedtime which hopefully would give him a better sleep and thereby helping his daytime symptoms of fatigue.    I would see him again approximately in a month for reevaluation.    Plan    Orders Placed This Encounter   Procedures   • Overnight Sleep Oximetry Study   • Pulmonary Function Test     New Medications Ordered This Visit   Medications   • rOPINIRole (REQUIP) 1 MG tablet     Sig: Take 1 tablet by mouth Every Night. Take 1 hour before bedtime.     Dispense:  30 tablet     Refill:  6                  Angela Page MD, FCCP, FAASM  Pulmonary, Critical Care, and Sleep Medicine

## 2017-06-07 ENCOUNTER — APPOINTMENT (OUTPATIENT)
Dept: RESPIRATORY THERAPY | Facility: HOSPITAL | Age: 72
End: 2017-06-07
Attending: INTERNAL MEDICINE

## 2017-06-22 ENCOUNTER — OFFICE VISIT (OUTPATIENT)
Dept: CARDIOLOGY | Facility: CLINIC | Age: 72
End: 2017-06-22

## 2017-06-22 VITALS
OXYGEN SATURATION: 97 % | BODY MASS INDEX: 31.39 KG/M2 | HEIGHT: 71 IN | SYSTOLIC BLOOD PRESSURE: 138 MMHG | DIASTOLIC BLOOD PRESSURE: 80 MMHG | WEIGHT: 224.2 LBS | HEART RATE: 61 BPM

## 2017-06-22 DIAGNOSIS — E66.01 MORBID OBESITY DUE TO EXCESS CALORIES (HCC): ICD-10-CM

## 2017-06-22 DIAGNOSIS — I10 ESSENTIAL HYPERTENSION: ICD-10-CM

## 2017-06-22 DIAGNOSIS — I25.10 CORONARY ARTERY DISEASE INVOLVING NATIVE CORONARY ARTERY OF NATIVE HEART WITHOUT ANGINA PECTORIS: ICD-10-CM

## 2017-06-22 DIAGNOSIS — E11.9 TYPE 2 DIABETES MELLITUS WITHOUT COMPLICATION, WITHOUT LONG-TERM CURRENT USE OF INSULIN (HCC): ICD-10-CM

## 2017-06-22 DIAGNOSIS — E78.2 MIXED HYPERLIPIDEMIA: Primary | ICD-10-CM

## 2017-06-22 DIAGNOSIS — F32.4 MAJOR DEPRESSIVE DISORDER WITH SINGLE EPISODE, IN PARTIAL REMISSION (HCC): ICD-10-CM

## 2017-06-22 PROCEDURE — 99214 OFFICE O/P EST MOD 30 MIN: CPT | Performed by: INTERNAL MEDICINE

## 2017-06-22 RX ORDER — PREGABALIN 25 MG/1
25 CAPSULE ORAL NIGHTLY
Qty: 90 CAPSULE | Refills: 0 | Status: SHIPPED | OUTPATIENT
Start: 2017-06-22 | End: 2017-07-14

## 2017-06-22 RX ORDER — FENOFIBRATE 145 MG/1
145 TABLET, COATED ORAL DAILY
Qty: 90 TABLET | Refills: 3 | Status: SHIPPED | OUTPATIENT
Start: 2017-06-22 | End: 2017-12-18

## 2017-06-22 RX ORDER — FLUOXETINE HYDROCHLORIDE 20 MG/1
20 CAPSULE ORAL DAILY
Qty: 30 CAPSULE | Refills: 2 | Status: SHIPPED | OUTPATIENT
Start: 2017-06-22 | End: 2017-12-18

## 2017-06-22 RX ORDER — FLUOXETINE HYDROCHLORIDE 40 MG/1
40 CAPSULE ORAL DAILY
Qty: 30 CAPSULE | Refills: 2 | Status: SHIPPED | OUTPATIENT
Start: 2017-06-22 | End: 2017-09-21 | Stop reason: SDUPTHER

## 2017-06-22 NOTE — PROGRESS NOTES
subjective     Chief Complaint   Patient presents with   • Follow-up     LAB RESULTS   • Coronary Artery Disease   • Hypertension   • Hyperlipidemia   • Leg Pain     LEGS HURTING WHEN HE AWAKES, FEELS AS IF HES NOT SLEEPING/ RESTING ANY     History of Present Illness     Leg cramps  Patient complains of severe leg cramps at night.  He saw Dr. Page who started him on Requip 1 mg at bedtime.  Is not helping patient had a sleep study done which was negative also.  He wants some medications to help it.  We will start him on Lyrica at bedtime    Coronary artery disease  Rose Singer has known coronary artery disease status post myocardial infarction and PCI to the RCA in 2005.   Patient is doing very well and  is quite stable. No chest pain, palpitations or shortness of breath. he has not used any nitroglycerin. No drug side effects. he is trying to follow diet also. he is quite satisfied with the progress.      HYPERTENSION  Rose Singer has long-standing history of essential hypertension. he is taking medications regularly. There are no medication side effects. Blood pressure is very well controlled. There has been no headache, nausea or chest pain. There has been no syncopal or presyncopal episode. he denies episodes of hypo-tension or accelerated hypertension.     Hyperlipidemia  Rose Singer has long-standing history of hyperlipidemia. Has been trying to lose weight and trying to follow diet and activity recommandations. Patient is tolerating medications very well. There has been no side effects.  He is taking Lipitor 20 mg daily.  Cholesterol is normal but triglyceride is elevated.     Diabetes Mellitus   Rose Singer has long-standing history of type 2 diabetes mellitus.  Patient is not taking any medication.  He is trying to control it with diet alone.      Prostate cancer  Patient has had prostate cancer status post robot assisted radical prostatectomy June 2013. There was extracapsular extension with negative surgical  margins.. His PSA has been very low and patient is asymptomatic.      Depression  Patient had major depressive episode has been taking 60 mg of Prozac and is doing better. Patient wants refill of medications which will be given. Patient was advised to continue following with psychiatric service also.        Past Surgical History:   Procedure Laterality Date   • CARDIAC CATHETERIZATION  08/2010   • CARDIOVASCULAR STRESS TEST  11/2014   • COLONOSCOPY  09/01/2014   • CORONARY STENT PLACEMENT  2005   • CORONARY STENT PLACEMENT  2010   • OTHER SURGICAL HISTORY      Cath Stent Placement 2005 and 2010.   • PROSTATECTOMY      Radical     Family History   Problem Relation Age of Onset   • Coronary artery disease Other    • Cancer Other      Colon   • Alzheimer's disease Mother    • Cancer Sister    • No Known Problems Brother    • Heart disease Sister      Past Medical History:   Diagnosis Date   • Anxiety    • CAD (coronary artery disease)    • Caffeine use     2 Cups Daily.    • Depression    • Diabetes mellitus    • Hyperlipidemia    • Hypertension    • Myocardial infarction    • Obesity    • Prostate cancer      Patient Active Problem List   Diagnosis   • CAD (coronary artery disease) status post myocardial infarction and PCI to the RCA in 2005   • Diabetes mellitus   • Hyperlipidemia   • Depression   • Essential hypertension   • Obesity   • Bradycardia   • Prostate CA       Social History   Substance Use Topics   • Smoking status: Former Smoker     Quit date: 5/18/1991   • Smokeless tobacco: Never Used   • Alcohol use No       Allergies   Allergen Reactions   • Penicillins    • Sulfa Antibiotics        Current Outpatient Prescriptions on File Prior to Visit   Medication Sig   • aspirin 325 MG tablet Take 325 mg by mouth daily.   • atorvastatin (LIPITOR) 20 MG tablet    • cholecalciferol (VITAMIN D3) 1000 UNITS tablet Take 1,000 Units by mouth daily.   • FLUoxetine (PROzac) 20 MG capsule Take 1 capsule by mouth Daily.  "  • FLUoxetine (PROzac) 40 MG capsule Take 1 capsule by mouth Daily.   • glucose blood test strip 1 each by Other route as needed. Use as instructed   • Lancets misc    • lisinopril (PRINIVIL,ZESTRIL) 5 MG tablet Take 1 tablet by mouth Daily.   • nitroglycerin (NITROSTAT) 0.4 MG SL tablet Place 0.4 mg under the tongue as needed for chest pain. Take no more than 3 doses in 15 minutes.   • raNITIdine (ZANTAC) 300 MG tablet Take 1 tablet by mouth Every Night.   • vitamin B-12 (CYANOCOBALAMIN) 1000 MCG tablet Take 1,000 mcg by mouth daily.   • metoprolol succinate XL (TOPROL-XL) 25 MG 24 hr tablet    • rOPINIRole (REQUIP) 1 MG tablet Take 1 tablet by mouth Every Night. Take 1 hour before bedtime.     No current facility-administered medications on file prior to visit.          The following portions of the patient's history were reviewed and updated as appropriate: allergies, current medications, past family history, past medical history, past social history, past surgical history and problem list.    Review of Systems   Constitution: Positive for weakness and malaise/fatigue.   HENT: Negative.    Eyes: Negative.    Cardiovascular: Negative.  Negative for chest pain, claudication, cyanosis, dyspnea on exertion, irregular heartbeat, leg swelling, near-syncope, orthopnea, palpitations, paroxysmal nocturnal dyspnea and syncope.   Respiratory: Negative.    Skin: Negative.    Musculoskeletal: Positive for muscle cramps and myalgias.   Gastrointestinal: Negative.    Genitourinary: Negative.         History of prostate cancer   Psychiatric/Behavioral: Positive for depression. The patient is nervous/anxious.           Objective:     /80 (BP Location: Left arm, Patient Position: Sitting)  Pulse 61  Ht 71\" (180.3 cm)  Wt 224 lb 3.2 oz (102 kg)  SpO2 97%  BMI 31.27 kg/m2  Physical Exam   Constitutional: He appears well-developed and well-nourished.   HENT:   Head: Normocephalic and atraumatic.   Mouth/Throat: Oropharynx " is clear and moist.   Eyes: Conjunctivae and EOM are normal. Pupils are equal, round, and reactive to light. No scleral icterus.   Neck: Normal range of motion. Neck supple. No JVD present. No tracheal deviation present. No thyromegaly present.   Cardiovascular: Normal rate, regular rhythm, normal heart sounds and intact distal pulses.  Exam reveals no friction rub.    No murmur heard.  Pulmonary/Chest: Effort normal and breath sounds normal. No respiratory distress. He has no wheezes. He has no rales. He exhibits no tenderness.   Abdominal: Soft. Bowel sounds are normal. He exhibits no distension and no mass. There is no tenderness. There is no rebound and no guarding.   Musculoskeletal: Normal range of motion. He exhibits no edema, tenderness or deformity.   Lymphadenopathy:     He has no cervical adenopathy.   Neurological: He is alert. He has normal reflexes. No cranial nerve deficit. He exhibits normal muscle tone. Coordination normal.   Skin: Skin is warm and dry.   Psychiatric: He has a normal mood and affect. His behavior is normal. Judgment and thought content normal.         Lab Review  Lab Results   Component Value Date     01/18/2017    K 4.8 01/18/2017     01/18/2017    BUN 11 01/18/2017    CREATININE 1.01 01/18/2017    GLUCOSE 136 (H) 01/18/2017    CALCIUM 9.8 01/18/2017    ALT 24 01/18/2017    ALKPHOS 50 01/18/2017    LABIL2 1.6 01/18/2017     Lab Results   Component Value Date    CKTOTAL 76 01/18/2017     Lab Results   Component Value Date    WBC 6.87 01/18/2017    HGB 15.8 01/18/2017    HCT 46.2 01/18/2017     01/18/2017     No results found for: INR  No results found for: MG  Lab Results   Component Value Date    PSA 0.10 02/10/2016     No results found for: BNP  Lab Results   Component Value Date    CHOL 146 01/18/2017    CHLPL 143 02/10/2016    TRIG 121 01/18/2017    HDL 31 (L) 01/18/2017    LDLCALC 91 01/18/2017    VLDL 24.2 01/18/2017    LDLHDL 2.93 01/18/2017          Procedures       I personally viewed and interpreted the patient's LAB data         Assessment:     1. Mixed hyperlipidemia    2. Essential hypertension    3. Coronary artery disease involving native coronary artery of native heart without angina pectoris    4. Morbid obesity due to excess calories    5. Type 2 diabetes mellitus without complication, without long-term current use of insulin    6. Major depressive disorder with single episode, in partial remission          Plan:      Lab work reviewed and discussed with the patient  Patient has type IV hyperlipidemia.  Cholesterol is well controlled with the Lipitor 20 mg daily.  Cholesterol is 158 but triglyceride is 183.    Patient was advised to take TriCor 145 mg daily and continue Lipitor.  Healthy lifestyle discussed with the patient.  He says that he is very active and is trying to diet.    Severe leg pain at night.  Patient saw Dr. Page who did sleep apnea study which was negative.  he was started on Requip 1 mg at bedtime but that is not helping at all.    Patient was started on Lyrica 25 at bedtime along with Advil 200 at bedtime for now.  Patient will stop Advil after about a week's therapy because of the side effects.    Blood pressure is very well controlled he will continue Zestril and metoprolol.    Coronary artery disease status post myocardial infarction and PCI to RCA in 2005 patient is totally asymptomatic and physically very active and has had no angina.    He does have a history of major depressive disorder and is doing very well with Prozac therapy.  Which will be continued.    Refills of medications given.  Lab work scheduled for next visit.        No Follow-up on file.

## 2017-07-07 ENCOUNTER — TELEPHONE (OUTPATIENT)
Dept: CARDIOLOGY | Facility: CLINIC | Age: 72
End: 2017-07-07

## 2017-07-07 NOTE — TELEPHONE ENCOUNTER
Called pt & told him that I have some RX cards in the office for a free 30 day trial. He's coming by Monday to pick it up.      ----- Message from Rose Singer sent at 2017  5:53 PM EDT -----  Regarding: Prescription Question  Contact: [phone]  Armen Rodney:         I was in the office two weeks ago on . At that visit, you gave me a prescription for Lyrica. I went that day to fill it and they said it would have to be approved with the insurance and they called your office back and told them. As of today, the pharmacy says they don't have anything and still can't fill it.          Are there anything that I can do or need to do to help speed up the process?  Thanks again for all your help.     Respectfully,  Rose Singer  . 49

## 2017-07-14 ENCOUNTER — TELEPHONE (OUTPATIENT)
Dept: CARDIOLOGY | Facility: CLINIC | Age: 72
End: 2017-07-14

## 2017-07-14 RX ORDER — GABAPENTIN 100 MG/1
200 CAPSULE ORAL NIGHTLY
Qty: 60 CAPSULE | Refills: 0 | OUTPATIENT
Start: 2017-07-14 | End: 2017-08-15 | Stop reason: SDUPTHER

## 2017-07-14 NOTE — TELEPHONE ENCOUNTER
----- Message from Adryan Rodney MD sent at 7/14/2017 12:57 PM EDT -----  Neurontin 100 mg at bedtime.  After 2 days he could increase it to 200 mg at bedtime  ----- Message -----     From: Noris Bartlett MA     Sent: 7/14/2017   8:48 AM       To: Adryan Rodney MD    lyrica was denied by pts insurance. Is there anything else he can take?

## 2017-07-17 RX ORDER — LISINOPRIL 5 MG/1
5 TABLET ORAL DAILY
Qty: 90 TABLET | Refills: 0 | Status: SHIPPED | OUTPATIENT
Start: 2017-07-17 | End: 2017-09-21

## 2017-08-15 RX ORDER — GABAPENTIN 100 MG/1
200 CAPSULE ORAL NIGHTLY
Qty: 60 CAPSULE | Refills: 2 | OUTPATIENT
Start: 2017-08-15 | End: 2017-09-21

## 2017-09-21 ENCOUNTER — OFFICE VISIT (OUTPATIENT)
Dept: CARDIOLOGY | Facility: CLINIC | Age: 72
End: 2017-09-21

## 2017-09-21 VITALS
SYSTOLIC BLOOD PRESSURE: 146 MMHG | HEIGHT: 71 IN | TEMPERATURE: 98.3 F | BODY MASS INDEX: 32.93 KG/M2 | DIASTOLIC BLOOD PRESSURE: 82 MMHG | OXYGEN SATURATION: 97 % | WEIGHT: 235.2 LBS | HEART RATE: 56 BPM

## 2017-09-21 DIAGNOSIS — I10 ESSENTIAL HYPERTENSION: Primary | ICD-10-CM

## 2017-09-21 DIAGNOSIS — I25.10 CORONARY ARTERY DISEASE INVOLVING NATIVE CORONARY ARTERY OF NATIVE HEART WITHOUT ANGINA PECTORIS: ICD-10-CM

## 2017-09-21 DIAGNOSIS — Z23 IMMUNIZATION DUE: ICD-10-CM

## 2017-09-21 DIAGNOSIS — E11.9 TYPE 2 DIABETES MELLITUS WITHOUT COMPLICATION, WITHOUT LONG-TERM CURRENT USE OF INSULIN (HCC): ICD-10-CM

## 2017-09-21 DIAGNOSIS — F32.4 MAJOR DEPRESSIVE DISORDER WITH SINGLE EPISODE, IN PARTIAL REMISSION (HCC): ICD-10-CM

## 2017-09-21 DIAGNOSIS — E78.2 MIXED HYPERLIPIDEMIA: ICD-10-CM

## 2017-09-21 DIAGNOSIS — E66.01 MORBID OBESITY DUE TO EXCESS CALORIES (HCC): ICD-10-CM

## 2017-09-21 PROCEDURE — G0008 ADMIN INFLUENZA VIRUS VAC: HCPCS | Performed by: INTERNAL MEDICINE

## 2017-09-21 PROCEDURE — 99214 OFFICE O/P EST MOD 30 MIN: CPT | Performed by: INTERNAL MEDICINE

## 2017-09-21 PROCEDURE — 90686 IIV4 VACC NO PRSV 0.5 ML IM: CPT | Performed by: INTERNAL MEDICINE

## 2017-09-21 RX ORDER — LISINOPRIL 5 MG/1
10 TABLET ORAL DAILY
Qty: 180 TABLET | Refills: 0 | Status: SHIPPED | OUTPATIENT
Start: 2017-09-21 | End: 2017-12-18

## 2017-09-21 RX ORDER — GABAPENTIN 100 MG/1
200 CAPSULE ORAL NIGHTLY
Qty: 60 CAPSULE | Refills: 2 | Status: CANCELLED | OUTPATIENT
Start: 2017-09-21

## 2017-09-21 RX ORDER — FLUOXETINE HYDROCHLORIDE 40 MG/1
40 CAPSULE ORAL DAILY
Qty: 30 CAPSULE | Refills: 2 | Status: SHIPPED | OUTPATIENT
Start: 2017-09-21 | End: 2017-12-18 | Stop reason: SDUPTHER

## 2017-09-21 RX ORDER — GABAPENTIN 300 MG/1
300 CAPSULE ORAL NIGHTLY
Qty: 90 CAPSULE | Refills: 1 | Status: SHIPPED | OUTPATIENT
Start: 2017-09-21 | End: 2018-03-19

## 2017-09-21 RX ORDER — FLUOXETINE HYDROCHLORIDE 20 MG/1
20 CAPSULE ORAL DAILY
Qty: 30 CAPSULE | Refills: 2 | Status: CANCELLED | OUTPATIENT
Start: 2017-09-21

## 2017-09-21 NOTE — PROGRESS NOTES
subjective     Chief Complaint   Patient presents with   • Follow-up   • Coronary Artery Disease   • Hypertension   • Hyperlipidemia   • Headache     RIGHT SIDE, SHARP PAINS, BEHIND EAR   • Insomnia     History of Present Illness     Patient is here for follow-up of multiple medical problems.  He complains of headache which is probably due to high blood pressure  He also complains of restless leg syndrome which is somewhat better with Neurontin but still not fully controlled  Patient also is worried about Prozac 60 mg daily.  He has to care for 6 months and is wondering if he could decrease the dose    Coronary artery disease  Rose Singer has known coronary artery disease status post myocardial infarction and PCI to the RCA in 2005.   Patient is doing very well and  is quite stable. No chest pain, palpitations or shortness of breath. he has not used any nitroglycerin. No drug side effects. he is trying to follow diet also. he is quite satisfied with the progress.       HYPERTENSION  Rose Singer has long-standing history of essential hypertension. he is taking medications regularly. There are no medication side effects. Blood pressure is high.  Patient complains of headache There has been no syncopal or presyncopal episode. he denies episodes of hypo-tension or accelerated hypertension.      Hyperlipidemia  Rose Singer has long-standing history of hyperlipidemia. Has been trying to lose weight and trying to follow diet and activity recommandations. Patient is tolerating medications very well. There has been no side effects.  He is taking Lipitor 20 mg daily.  Cholesterol is normal but triglyceride is elevated.      Diabetes Mellitus   Rose Singer has long-standing history of type 2 diabetes mellitus.  Patient is not taking any medication.  He is trying to control it with diet alone.      Prostate cancer  Patient has had prostate cancer status post robot assisted radical prostatectomy June 2013. There was extracapsular  extension with negative surgical margins.. His PSA has been very low and patient is asymptomatic.      Depression  Patient had major depressive episode has been taking 60 mg of Prozac and is doing better.  Patient is concerned with high doses of Prozac.  He says he is feeling significantly better and is ready to decrease the dose at this time    Past Surgical History:   Procedure Laterality Date   • CARDIAC CATHETERIZATION  08/2010   • CARDIOVASCULAR STRESS TEST  11/2014   • CARDIOVASCULAR STRESS TEST  11/2014   • COLONOSCOPY  09/01/2014   • COLONOSCOPY  09/2014   • CORONARY STENT PLACEMENT  2005   • CORONARY STENT PLACEMENT  2010   • ECHO - CONVERTED  08/2011   • OTHER SURGICAL HISTORY      Cath Stent Placement 2005 and 2010.   • PROSTATECTOMY      Radical     Family History   Problem Relation Age of Onset   • Coronary artery disease Other    • Cancer Other      Colon   • Alzheimer's disease Mother    • Cancer Sister    • No Known Problems Brother    • Heart disease Sister      Past Medical History:   Diagnosis Date   • Anxiety    • CAD (coronary artery disease)    • Caffeine use     2 Cups Daily.    • Depression    • Diabetes mellitus    • Hyperlipidemia    • Hypertension    • Myocardial infarction    • Obesity    • Prostate cancer      Patient Active Problem List   Diagnosis   • CAD (coronary artery disease) status post myocardial infarction and PCI to the RCA in 2005   • Diabetes mellitus   • Hyperlipidemia   • Depression   • Essential hypertension   • Obesity   • Bradycardia   • Prostate CA       Social History   Substance Use Topics   • Smoking status: Former Smoker     Quit date: 5/18/1991   • Smokeless tobacco: Never Used   • Alcohol use No       Allergies   Allergen Reactions   • Penicillins    • Sulfa Antibiotics        Current Outpatient Prescriptions on File Prior to Visit   Medication Sig   • aspirin 325 MG tablet Take 325 mg by mouth daily.   • atorvastatin (LIPITOR) 20 MG tablet    • cholecalciferol  (VITAMIN D3) 1000 UNITS tablet Take 1,000 Units by mouth daily.   • fenofibrate (TRICOR) 145 MG tablet Take 1 tablet by mouth Daily.   • FLUoxetine (PROzac) 20 MG capsule Take 1 capsule by mouth Daily.   • glucose blood test strip 1 each by Other route as needed. Use as instructed   • Lancets misc    • nitroglycerin (NITROSTAT) 0.4 MG SL tablet Place 0.4 mg under the tongue as needed for chest pain. Take no more than 3 doses in 15 minutes.   • raNITIdine (ZANTAC) 300 MG tablet Take 1 tablet by mouth Every Night.   • vitamin B-12 (CYANOCOBALAMIN) 1000 MCG tablet Take 1,000 mcg by mouth daily.   • [DISCONTINUED] FLUoxetine (PROzac) 40 MG capsule Take 1 capsule by mouth Daily.   • [DISCONTINUED] gabapentin (NEURONTIN) 100 MG capsule Take 2 capsules by mouth Every Night.   • [DISCONTINUED] lisinopril (PRINIVIL,ZESTRIL) 5 MG tablet Take 1 tablet by mouth Daily.   • metoprolol succinate XL (TOPROL-XL) 25 MG 24 hr tablet      No current facility-administered medications on file prior to visit.          The following portions of the patient's history were reviewed and updated as appropriate: allergies, current medications, past family history, past medical history, past social history, past surgical history and problem list.    Review of Systems   Constitution: Negative.   HENT: Negative for congestion.    Eyes: Negative.    Cardiovascular: Negative.  Negative for chest pain, cyanosis, dyspnea on exertion, irregular heartbeat, leg swelling, near-syncope, orthopnea, palpitations, paroxysmal nocturnal dyspnea and syncope.        Elevated blood pressure   Respiratory: Negative.  Negative for shortness of breath.    Hematologic/Lymphatic: Negative.    Musculoskeletal: Negative.    Gastrointestinal: Negative.    Neurological: Positive for headaches, numbness and paresthesias.   Psychiatric/Behavioral: Positive for depression.          Objective:     /82 (BP Location: Left arm, Patient Position: Sitting)  Pulse 56  Temp  "98.3 °F (36.8 °C)  Ht 71\" (180.3 cm)  Wt 235 lb 3.2 oz (107 kg)  SpO2 97%  BMI 32.8 kg/m2  Physical Exam   Constitutional: He appears well-developed and well-nourished. No distress.   HENT:   Head: Normocephalic and atraumatic.   Mouth/Throat: Oropharynx is clear and moist. No oropharyngeal exudate.   Eyes: Conjunctivae and EOM are normal. Pupils are equal, round, and reactive to light. No scleral icterus.   Neck: Normal range of motion. Neck supple. No JVD present. No tracheal deviation present. No thyromegaly present.   Cardiovascular: Normal rate, regular rhythm, normal heart sounds and intact distal pulses.  PMI is not displaced.  Exam reveals no gallop, no friction rub and no decreased pulses.    No murmur heard.  Pulses:       Carotid pulses are 3+ on the right side, and 3+ on the left side.       Radial pulses are 3+ on the right side, and 3+ on the left side.   Pulmonary/Chest: Effort normal and breath sounds normal. No respiratory distress. He has no wheezes. He has no rales. He exhibits no tenderness.   Abdominal: Soft. Bowel sounds are normal. He exhibits no distension, no abdominal bruit and no mass. There is no splenomegaly or hepatomegaly. There is no tenderness. There is no rebound and no guarding.   Musculoskeletal: Normal range of motion. He exhibits no edema, tenderness or deformity.   Lymphadenopathy:     He has no cervical adenopathy.   Neurological: He is alert. He has normal reflexes. No cranial nerve deficit. He exhibits normal muscle tone. Coordination normal.   Skin: Skin is warm and dry. No rash noted. He is not diaphoretic. No erythema.   Psychiatric: He has a normal mood and affect. His behavior is normal. Judgment and thought content normal.         Lab Review      Procedures       I personally viewed and interpreted the patient's LAB data         Assessment:     1. Essential hypertension    2. Morbid obesity due to excess calories    3. Coronary artery disease involving native " coronary artery of native heart without angina pectoris    4. Mixed hyperlipidemia    5. Type 2 diabetes mellitus without complication, without long-term current use of insulin    6. Major depressive disorder with single episode, in partial remission    7. Immunization due          Plan:      Blood pressure is elevated.  Patient was advised to increase lisinopril 10 mg daily.    Patient is taking Prozac 60 mg daily.  Depression is significantly better now patient is worried about the side effect of the medication.  He is taking Prozac 60 mg for 6 months he was advised to decrease it to 40 mg daily.    Restless leg syndrome is somewhat better patient is taking Neurontin 200 mg daily and is wondering if she could increase the dose.  Neurontin was increased to 300 mg at bedtime.    Flu shot was given    Lab scheduled for next visit    Risk factor modification and weight loss emphasized.    Patient's headache is probably due to hypertension.  If headaches persist further workup will be needed.        No Follow-up on file.

## 2017-12-04 RX ORDER — RANITIDINE 300 MG/1
TABLET ORAL
Qty: 90 TABLET | Refills: 0 | Status: SHIPPED | OUTPATIENT
Start: 2017-12-04 | End: 2017-12-18

## 2017-12-18 ENCOUNTER — OFFICE VISIT (OUTPATIENT)
Dept: CARDIOLOGY | Facility: CLINIC | Age: 72
End: 2017-12-18

## 2017-12-18 VITALS
WEIGHT: 236 LBS | HEIGHT: 71 IN | SYSTOLIC BLOOD PRESSURE: 127 MMHG | OXYGEN SATURATION: 98 % | HEART RATE: 65 BPM | DIASTOLIC BLOOD PRESSURE: 76 MMHG | BODY MASS INDEX: 33.04 KG/M2

## 2017-12-18 DIAGNOSIS — E11.9 TYPE 2 DIABETES MELLITUS WITHOUT COMPLICATION, WITHOUT LONG-TERM CURRENT USE OF INSULIN (HCC): ICD-10-CM

## 2017-12-18 DIAGNOSIS — E78.2 MIXED HYPERLIPIDEMIA: ICD-10-CM

## 2017-12-18 DIAGNOSIS — E66.09 CLASS 1 OBESITY DUE TO EXCESS CALORIES WITHOUT SERIOUS COMORBIDITY WITH BODY MASS INDEX (BMI) OF 32.0 TO 32.9 IN ADULT: ICD-10-CM

## 2017-12-18 DIAGNOSIS — I10 ESSENTIAL HYPERTENSION: ICD-10-CM

## 2017-12-18 DIAGNOSIS — I25.10 CORONARY ARTERY DISEASE INVOLVING NATIVE CORONARY ARTERY OF NATIVE HEART WITHOUT ANGINA PECTORIS: Primary | ICD-10-CM

## 2017-12-18 PROCEDURE — 99214 OFFICE O/P EST MOD 30 MIN: CPT | Performed by: INTERNAL MEDICINE

## 2017-12-18 RX ORDER — RANITIDINE 150 MG/1
150 CAPSULE ORAL EVERY EVENING
Qty: 90 CAPSULE | Refills: 2 | Status: SHIPPED | OUTPATIENT
Start: 2017-12-18 | End: 2018-03-27 | Stop reason: SDUPTHER

## 2017-12-18 RX ORDER — FLUOXETINE HYDROCHLORIDE 40 MG/1
40 CAPSULE ORAL DAILY
Qty: 90 CAPSULE | Refills: 2 | Status: SHIPPED | OUTPATIENT
Start: 2017-12-18 | End: 2018-09-13 | Stop reason: SDUPTHER

## 2017-12-18 RX ORDER — LISINOPRIL 10 MG/1
10 TABLET ORAL DAILY
Qty: 90 TABLET | Refills: 3 | Status: SHIPPED | OUTPATIENT
Start: 2017-12-18 | End: 2018-09-13 | Stop reason: SDUPTHER

## 2017-12-18 NOTE — PROGRESS NOTES
subjective     Chief Complaint   Patient presents with   • Follow-up   • Hyperlipidemia   • essential Hypertension   • Slow Heart Rate     History of Present Illness    Patient is 72 years old white male who is here for follow-up of multiple chronic medical problems.  Patient has known coronary artery disease with prior myocardial infarction with PCI to RCA in 2005.  Patient has been doing very well.  He denies any chest pain palpitations or shortness of breath.  Blood pressure is very well controlled with current medications.  There are no drug side effects.  Patient also has hyperlipidemia.  He is taking Lipitor 20 mg daily.  Lab work was reviewed and is acceptable.  No drug side effects.  Patient has diabetes mellitus type 2 lab work reviewed blood sugar shows fair control.  No drug side effects.  Patient also has anxiety disorder and depression she is doing very well.  For prostate cancer he is following closely with the urologist.  He is doing very well.    Past Surgical History:   Procedure Laterality Date   • CARDIAC CATHETERIZATION  08/2010   • CARDIOVASCULAR STRESS TEST  11/2014   • CARDIOVASCULAR STRESS TEST  11/2014   • COLONOSCOPY  09/01/2014   • COLONOSCOPY  09/2014   • CORONARY STENT PLACEMENT  2005   • CORONARY STENT PLACEMENT  2010   • ECHO - CONVERTED  08/2011   • OTHER SURGICAL HISTORY      Cath Stent Placement 2005 and 2010.   • PROSTATECTOMY      Radical     Family History   Problem Relation Age of Onset   • Coronary artery disease Other    • Cancer Other      Colon   • Alzheimer's disease Mother    • Cancer Sister    • No Known Problems Brother    • Heart disease Sister      Past Medical History:   Diagnosis Date   • Anxiety    • CAD (coronary artery disease)    • Caffeine use     2 Cups Daily.    • Depression    • Diabetes mellitus    • Hyperlipidemia    • Hypertension    • Myocardial infarction    • Obesity    • Prostate cancer      Patient Active Problem List   Diagnosis   • CAD (coronary  artery disease) status post myocardial infarction and PCI to the RCA in 2005   • Diabetes mellitus   • Hyperlipidemia   • Depression   • Essential hypertension   • Obesity   • Prostate CA       Social History   Substance Use Topics   • Smoking status: Former Smoker     Quit date: 5/18/1991   • Smokeless tobacco: Never Used   • Alcohol use No       Allergies   Allergen Reactions   • Penicillins    • Sulfa Antibiotics        Current Outpatient Prescriptions on File Prior to Visit   Medication Sig   • aspirin 325 MG tablet Take 325 mg by mouth daily.   • atorvastatin (LIPITOR) 20 MG tablet 10 mg.   • cholecalciferol (VITAMIN D3) 1000 UNITS tablet Take 1,000 Units by mouth daily.   • fenofibrate (TRICOR) 145 MG tablet Take 1 tablet by mouth Daily.   • FLUoxetine (PROzac) 40 MG capsule Take 1 capsule by mouth Daily.   • gabapentin (NEURONTIN) 300 MG capsule Take 1 capsule by mouth Every Night.   • glucose blood test strip 1 each by Other route as needed. Use as instructed   • Lancets misc    • lisinopril (PRINIVIL,ZESTRIL) 5 MG tablet Take 2 tablets by mouth Daily.   • nitroglycerin (NITROSTAT) 0.4 MG SL tablet Place 0.4 mg under the tongue as needed for chest pain. Take no more than 3 doses in 15 minutes.   • raNITIdine (ZANTAC) 300 MG tablet TAKE ONE TABLET BY MOUTH ONCE NIGHTLY   • vitamin B-12 (CYANOCOBALAMIN) 1000 MCG tablet Take 1,000 mcg by mouth daily.   • FLUoxetine (PROzac) 20 MG capsule Take 1 capsule by mouth Daily.   • metoprolol succinate XL (TOPROL-XL) 25 MG 24 hr tablet      No current facility-administered medications on file prior to visit.          The following portions of the patient's history were reviewed and updated as appropriate: allergies, current medications, past family history, past medical history, past social history, past surgical history and problem list.    Review of Systems   Constitution: Negative.   HENT: Negative.  Negative for congestion.    Eyes: Negative.    Cardiovascular:  "Negative.  Negative for chest pain, cyanosis, dyspnea on exertion, irregular heartbeat, leg swelling, near-syncope, orthopnea, palpitations, paroxysmal nocturnal dyspnea and syncope.   Respiratory: Negative.  Negative for shortness of breath.    Hematologic/Lymphatic: Negative.    Musculoskeletal: Negative.    Gastrointestinal: Negative.    Neurological: Negative.  Negative for headaches.          Objective:     /76 (BP Location: Left arm, Patient Position: Sitting, Cuff Size: Adult)  Pulse 65  Ht 180.3 cm (71\")  Wt 107 kg (236 lb)  SpO2 98%  BMI 32.92 kg/m2  Physical Exam   Constitutional: He appears well-developed and well-nourished. No distress.   HENT:   Head: Normocephalic and atraumatic.   Mouth/Throat: Oropharynx is clear and moist. No oropharyngeal exudate.   Eyes: Conjunctivae and EOM are normal. Pupils are equal, round, and reactive to light. No scleral icterus.   Neck: Normal range of motion. Neck supple. No JVD present. No tracheal deviation present. No thyromegaly present.   Cardiovascular: Normal rate, regular rhythm, normal heart sounds and intact distal pulses.  PMI is not displaced.  Exam reveals no gallop, no friction rub and no decreased pulses.    No murmur heard.  Pulses:       Carotid pulses are 3+ on the right side, and 3+ on the left side.       Radial pulses are 3+ on the right side, and 3+ on the left side.   Pulmonary/Chest: Effort normal and breath sounds normal. No respiratory distress. He has no wheezes. He has no rales. He exhibits no tenderness.   Abdominal: Soft. Bowel sounds are normal. He exhibits no distension, no abdominal bruit and no mass. There is no splenomegaly or hepatomegaly. There is no tenderness. There is no rebound and no guarding.   Musculoskeletal: Normal range of motion. He exhibits no edema, tenderness or deformity.   Lymphadenopathy:     He has no cervical adenopathy.   Neurological: He is alert. He has normal reflexes. No cranial nerve deficit. He " exhibits normal muscle tone. Coordination normal.   Skin: Skin is warm and dry. No rash noted. He is not diaphoretic. No erythema.   Psychiatric: He has a normal mood and affect. His behavior is normal. Judgment and thought content normal.         Lab Review  December 12 17  CBC is normal  Hemoglobin A1c 5.7 with blood sugar 123  Comprehensive metabolic panel normal  Cholesterol 161, triglyceride 122, HDL 34,     Procedures       I personally viewed and interpreted the patient's LAB data         Assessment:     1. Coronary artery disease involving native coronary artery of native heart without angina pectoris    2. Essential hypertension    3. Mixed hyperlipidemia    4. Class 1 obesity due to excess calories without serious comorbidity with body mass index (BMI) of 32.0 to 32.9 in adult    5. Type 2 diabetes mellitus without complication, without long-term current use of insulin          Plan:      Patient has known coronary artery disease with prior myocardial infarction and PCI to RCA in 2005.  Patient has been doing very well is currently asymptomatic.  He is not requiring any nitroglycerin.  He is taking metoprolol XL 25 daily and 325 mg aspirin daily.    Blood pressure seems to be very well controlled.  Patient was advised to continue lisinopril 10 mg daily and Toprol-XL 25 daily.    Lipids are very well controlled with Lipitor 20 and TriCor 145 both of those medications will be continued.    Sugar is very well controlled with diet alone.  Patient is trying to exercise and diet.  Lab work were reviewed and discussed with the patient    Anxiety and depression is better.  Patient is following closely for prostate CA with the urologist.  Follow-up scheduled.        No Follow-up on file.

## 2018-03-19 ENCOUNTER — OFFICE VISIT (OUTPATIENT)
Dept: CARDIOLOGY | Facility: CLINIC | Age: 73
End: 2018-03-19

## 2018-03-19 VITALS
WEIGHT: 222 LBS | SYSTOLIC BLOOD PRESSURE: 134 MMHG | DIASTOLIC BLOOD PRESSURE: 82 MMHG | BODY MASS INDEX: 31.08 KG/M2 | OXYGEN SATURATION: 95 % | HEIGHT: 71 IN | HEART RATE: 68 BPM

## 2018-03-19 DIAGNOSIS — E78.2 MIXED HYPERLIPIDEMIA: ICD-10-CM

## 2018-03-19 DIAGNOSIS — I10 ESSENTIAL HYPERTENSION: ICD-10-CM

## 2018-03-19 DIAGNOSIS — E11.9 TYPE 2 DIABETES MELLITUS WITHOUT COMPLICATION, WITHOUT LONG-TERM CURRENT USE OF INSULIN (HCC): ICD-10-CM

## 2018-03-19 DIAGNOSIS — I25.10 CORONARY ARTERY DISEASE INVOLVING NATIVE CORONARY ARTERY OF NATIVE HEART WITHOUT ANGINA PECTORIS: Primary | ICD-10-CM

## 2018-03-19 DIAGNOSIS — E66.09 CLASS 1 OBESITY DUE TO EXCESS CALORIES WITHOUT SERIOUS COMORBIDITY WITH BODY MASS INDEX (BMI) OF 32.0 TO 32.9 IN ADULT: ICD-10-CM

## 2018-03-19 PROCEDURE — 99214 OFFICE O/P EST MOD 30 MIN: CPT | Performed by: INTERNAL MEDICINE

## 2018-03-19 RX ORDER — ROSUVASTATIN CALCIUM 20 MG/1
20 TABLET, COATED ORAL DAILY
COMMUNITY
End: 2018-09-13 | Stop reason: SDUPTHER

## 2018-03-19 NOTE — PROGRESS NOTES
subjective     Chief Complaint   Patient presents with   • Hypertension   • Hyperlipidemia   • Diabetes   • Follow-up     Crestor added by cardiologist in [place]... Wanting to D/C Neurotin      History of Present Illness  Patient is 72 years old white male who has known coronary artery disease with prior myocardial infarction and PCI to the RCA in 2005.  Patient is doing very well he recently went to see a cardiologist in [place] no further details aren't known.  We will try to get a copy of his records.    Patient also has hypertension which is very well controlled with lisinopril and Toprol.    Lipids are controlled with Lipitor 20 mg daily.  Patient is taking antiplatelet therapy with aspirin.  He is trying to lose weight.  He also has mild hyperglycemia.  Overall he seems to doing very well.  He is trying to diet and take medications regularly.  Patient has prostate CA and is doing very well.  Depression is better with Prozac.  He wants to DC Neurontin however.    Patient is trying to lose weight and he has lost 14 pounds    Past Surgical History:   Procedure Laterality Date   • CARDIAC CATHETERIZATION  08/2010   • CARDIOVASCULAR STRESS TEST  11/2014   • CARDIOVASCULAR STRESS TEST  11/2014   • COLONOSCOPY  09/01/2014   • COLONOSCOPY  09/2014   • CORONARY STENT PLACEMENT  2005   • CORONARY STENT PLACEMENT  2010   • ECHO - CONVERTED  08/2011   • OTHER SURGICAL HISTORY      Cath Stent Placement 2005 and 2010.   • PROSTATECTOMY      Radical     Family History   Problem Relation Age of Onset   • Coronary artery disease Other    • Cancer Other      Colon   • Alzheimer's disease Mother    • Cancer Sister    • No Known Problems Brother    • Heart disease Sister      Past Medical History:   Diagnosis Date   • Anxiety    • CAD (coronary artery disease)    • Caffeine use     2 Cups Daily.    • Depression    • Diabetes mellitus    • Hyperlipidemia    • Hypertension    • Myocardial infarction    • Obesity    • Prostate  cancer      Patient Active Problem List   Diagnosis   • CAD (coronary artery disease) status post myocardial infarction and PCI to the RCA in 2005   • Diabetes mellitus   • Hyperlipidemia   • Depression   • Essential hypertension   • Obesity   • Prostate CA       Social History   Substance Use Topics   • Smoking status: Former Smoker     Quit date: 5/18/1991   • Smokeless tobacco: Never Used   • Alcohol use No       Allergies   Allergen Reactions   • Penicillins    • Sulfa Antibiotics        Current Outpatient Prescriptions on File Prior to Visit   Medication Sig   • aspirin 325 MG tablet Take 325 mg by mouth daily.   • cholecalciferol (VITAMIN D3) 1000 UNITS tablet Take 1,000 Units by mouth daily.   • FLUoxetine (PROzac) 40 MG capsule Take 1 capsule by mouth Daily.   • glucose blood test strip 1 each by Other route as needed. Use as instructed   • Lancets misc    • lisinopril (PRINIVIL,ZESTRIL) 10 MG tablet Take 1 tablet by mouth Daily.   • nitroglycerin (NITROSTAT) 0.4 MG SL tablet Place 0.4 mg under the tongue as needed for chest pain. Take no more than 3 doses in 15 minutes.   • ranitidine (ZANTAC) 150 MG capsule Take 1 capsule by mouth Every Evening.   • vitamin B-12 (CYANOCOBALAMIN) 1000 MCG tablet Take 1,000 mcg by mouth daily.     No current facility-administered medications on file prior to visit.          The following portions of the patient's history were reviewed and updated as appropriate: allergies, current medications, past family history, past medical history, past social history, past surgical history and problem list.    Review of Systems   Constitution: Negative.   HENT: Negative.  Negative for congestion.    Eyes: Negative.    Cardiovascular: Negative.  Negative for chest pain, cyanosis, dyspnea on exertion, irregular heartbeat, leg swelling, near-syncope, orthopnea, palpitations, paroxysmal nocturnal dyspnea and syncope.   Respiratory: Negative.  Negative for shortness of breath.   "  Hematologic/Lymphatic: Negative.    Musculoskeletal: Negative.    Gastrointestinal: Negative.    Neurological: Negative.  Negative for headaches.          Objective:     /82 (BP Location: Left arm, Patient Position: Sitting)   Pulse 68   Ht 180.3 cm (71\")   Wt 101 kg (222 lb)   SpO2 95%   BMI 30.96 kg/m²   Physical Exam   Constitutional: He appears well-developed and well-nourished. No distress.   HENT:   Head: Normocephalic and atraumatic.   Mouth/Throat: Oropharynx is clear and moist. No oropharyngeal exudate.   Eyes: Conjunctivae and EOM are normal. Pupils are equal, round, and reactive to light. No scleral icterus.   Neck: Normal range of motion. Neck supple. No JVD present. No tracheal deviation present. No thyromegaly present.   Cardiovascular: Normal rate, regular rhythm, normal heart sounds and intact distal pulses.  PMI is not displaced.  Exam reveals no gallop, no friction rub and no decreased pulses.    No murmur heard.  Pulses:       Carotid pulses are 3+ on the right side, and 3+ on the left side.       Radial pulses are 3+ on the right side, and 3+ on the left side.   Pulmonary/Chest: Effort normal and breath sounds normal. No respiratory distress. He has no wheezes. He has no rales. He exhibits no tenderness.   Abdominal: Soft. Bowel sounds are normal. He exhibits no distension, no abdominal bruit and no mass. There is no splenomegaly or hepatomegaly. There is no tenderness. There is no rebound and no guarding.   Musculoskeletal: Normal range of motion. He exhibits no edema, tenderness or deformity.   Lymphadenopathy:     He has no cervical adenopathy.   Neurological: He is alert. He has normal reflexes. No cranial nerve deficit. He exhibits normal muscle tone. Coordination normal.   Skin: Skin is warm and dry. No rash noted. He is not diaphoretic. No erythema.   Psychiatric: He has a normal mood and affect. His behavior is normal. Judgment and thought content normal.         Lab " Review  Last lab work dated December 12, 2017 showed   cholesterol of 161, triglyceride 122,  and HDL 34.  Blood sugar was high at 123 with hemoglobin A1c 5.7  CBC CMP and CPK otherwise normal    Procedures       I personally viewed and interpreted the patient's LAB data         Assessment:     1. Coronary artery disease involving native coronary artery of native heart without angina pectoris    2. Essential hypertension    3. Class 1 obesity due to excess calories without serious comorbidity with body mass index (BMI) of 32.0 to 32.9 in adult    4. Mixed hyperlipidemia    5. Type 2 diabetes mellitus without complication, without long-term current use of insulin          Plan:      Patient was advised to DC Neurontin.  Will try to get the records from Dr. in [place].  Patient will continue rest of the medications.  Weight loss was emphasized.He already has lost 14 pounds in last 3 months.  and is trying hard.  Aggressive risk factor modification and healthy lifestyle discussed.  Refills were given  Lab work scheduled for next visit  Follow-up in 3 months.        Return in about 3 months (around 6/19/2018).

## 2018-03-27 ENCOUNTER — PATIENT MESSAGE (OUTPATIENT)
Dept: CARDIOLOGY | Facility: CLINIC | Age: 73
End: 2018-03-27

## 2018-03-27 RX ORDER — RANITIDINE 150 MG/1
150 CAPSULE ORAL EVERY EVENING
Qty: 90 CAPSULE | Refills: 3 | Status: SHIPPED | OUTPATIENT
Start: 2018-03-27 | End: 2018-09-13 | Stop reason: SDUPTHER

## 2018-03-27 NOTE — TELEPHONE ENCOUNTER
From: Rose Singer  To: Adryan Rodney MD  Sent: 3/27/2018 11:49 AM EDT  Subject: Prescription Question    Hi Crystal:    Could you call in a refill for Ranitidine 150, (90 day supply) at [facility].     Thanks   Rose Singer  [birth year]

## 2018-09-13 ENCOUNTER — OFFICE VISIT (OUTPATIENT)
Dept: CARDIOLOGY | Facility: CLINIC | Age: 73
End: 2018-09-13

## 2018-09-13 VITALS
HEART RATE: 58 BPM | OXYGEN SATURATION: 98 % | BODY MASS INDEX: 30.94 KG/M2 | WEIGHT: 221 LBS | HEIGHT: 71 IN | SYSTOLIC BLOOD PRESSURE: 128 MMHG | DIASTOLIC BLOOD PRESSURE: 76 MMHG

## 2018-09-13 DIAGNOSIS — R41.3 MEMORY LOSS: ICD-10-CM

## 2018-09-13 DIAGNOSIS — I10 ESSENTIAL HYPERTENSION: ICD-10-CM

## 2018-09-13 DIAGNOSIS — E11.9 TYPE 2 DIABETES MELLITUS WITHOUT COMPLICATION, WITHOUT LONG-TERM CURRENT USE OF INSULIN (HCC): ICD-10-CM

## 2018-09-13 DIAGNOSIS — C61 PROSTATE CA (HCC): ICD-10-CM

## 2018-09-13 DIAGNOSIS — E78.2 MIXED HYPERLIPIDEMIA: ICD-10-CM

## 2018-09-13 DIAGNOSIS — I25.10 CORONARY ARTERY DISEASE INVOLVING NATIVE CORONARY ARTERY OF NATIVE HEART WITHOUT ANGINA PECTORIS: Primary | ICD-10-CM

## 2018-09-13 PROCEDURE — 99214 OFFICE O/P EST MOD 30 MIN: CPT | Performed by: INTERNAL MEDICINE

## 2018-09-13 RX ORDER — MEMANTINE HYDROCHLORIDE 5 MG/1
5 TABLET ORAL 2 TIMES DAILY
Qty: 180 TABLET | Refills: 0 | Status: SHIPPED | OUTPATIENT
Start: 2018-09-13 | End: 2018-12-01 | Stop reason: SDUPTHER

## 2018-09-13 RX ORDER — RANITIDINE 150 MG/1
150 CAPSULE ORAL EVERY EVENING
Qty: 90 CAPSULE | Refills: 1 | Status: SHIPPED | OUTPATIENT
Start: 2018-09-13 | End: 2019-05-06 | Stop reason: SDUPTHER

## 2018-09-13 RX ORDER — ROSUVASTATIN CALCIUM 20 MG/1
20 TABLET, COATED ORAL DAILY
Qty: 90 TABLET | Refills: 1 | Status: SHIPPED | OUTPATIENT
Start: 2018-09-13 | End: 2019-04-02 | Stop reason: SDUPTHER

## 2018-09-13 RX ORDER — SILDENAFIL 100 MG/1
100 TABLET, FILM COATED ORAL
Qty: 30 TABLET | Refills: 2 | Status: SHIPPED | OUTPATIENT
Start: 2018-09-13 | End: 2019-11-11

## 2018-09-13 RX ORDER — FLUOXETINE HYDROCHLORIDE 40 MG/1
40 CAPSULE ORAL DAILY
Qty: 90 CAPSULE | Refills: 1 | Status: SHIPPED | OUTPATIENT
Start: 2018-09-13 | End: 2019-03-19 | Stop reason: SDUPTHER

## 2018-09-13 RX ORDER — SILDENAFIL 100 MG/1
100 TABLET, FILM COATED ORAL
Qty: 30 TABLET | Refills: 2 | Status: SHIPPED | OUTPATIENT
Start: 2018-09-13 | End: 2018-09-13

## 2018-09-13 RX ORDER — LISINOPRIL 10 MG/1
10 TABLET ORAL DAILY
Qty: 90 TABLET | Refills: 1 | Status: SHIPPED | OUTPATIENT
Start: 2018-09-13 | End: 2018-12-11 | Stop reason: SDUPTHER

## 2018-09-13 NOTE — PROGRESS NOTES
subjective     Chief Complaint   Patient presents with   • Coronary Artery Disease   • Essential hypertension   • Med Refill     Pending refills   • Results     Labs     History of Present Illness    Patient is 73 years old white male who is here for follow-up of multiple chronic medical problems . patient has known coronary artery disease status post PCI to the RCA in 2005.  Patient has been doing very well from cardiac standpoint.  He denies any cardiac symptoms at this time.    Patient has essential hypertension.  He is taking lisinopril regularly.  Blood pressure has been very well controlled.    Patient also has hyperlipidemia.  He is taking Crestor regularly without any side effects.    Patient has anxiety disorder and depression which is controlled with Prozac.  GI symptoms are controlled with Zantac.  Patient states that he has been having some mild memory loss with family history of Alzheimer's dementia patient is quite concerned will start medication.    He also has prostate CA and is following closely with the urologist.  Past Surgical History:   Procedure Laterality Date   • CARDIAC CATHETERIZATION  08/2010   • CARDIOVASCULAR STRESS TEST  11/2014   • CARDIOVASCULAR STRESS TEST  11/2014   • COLONOSCOPY  09/01/2014   • COLONOSCOPY  09/2014   • CORONARY STENT PLACEMENT  2005   • CORONARY STENT PLACEMENT  2010   • ECHO - CONVERTED  08/2011   • OTHER SURGICAL HISTORY      Cath Stent Placement 2005 and 2010.   • PROSTATECTOMY      Radical     Family History   Problem Relation Age of Onset   • Coronary artery disease Other    • Cancer Other         Colon   • Alzheimer's disease Mother    • Cancer Sister    • No Known Problems Brother    • Heart disease Sister      Past Medical History:   Diagnosis Date   • Anxiety    • CAD (coronary artery disease)    • Caffeine use     2 Cups Daily.    • Depression    • Diabetes mellitus (CMS/[facility])    • Hyperlipidemia    • Hypertension    • Myocardial infarction    • Obesity     • Prostate cancer (CMS/[facility])      Patient Active Problem List   Diagnosis   • CAD (coronary artery disease) status post myocardial infarction and PCI to the RCA in 2005   • Diabetes mellitus (CMS/[facility])   • Hyperlipidemia   • Depression   • Essential hypertension   • Obesity   • Prostate CA (CMS/[facility])       Social History   Substance Use Topics   • Smoking status: Former Smoker     Quit date: 5/18/1991   • Smokeless tobacco: Never Used   • Alcohol use No       Allergies   Allergen Reactions   • Penicillins    • Sulfa Antibiotics        Current Outpatient Prescriptions on File Prior to Visit   Medication Sig   • aspirin 325 MG tablet Take 325 mg by mouth daily.   • cholecalciferol (VITAMIN D3) 1000 UNITS tablet Take 1,000 Units by mouth daily.   • FLUoxetine (PROzac) 40 MG capsule Take 1 capsule by mouth Daily.   • glucose blood test strip 1 each by Other route As Needed (prn). Use as instructed   • Lancets misc    • lisinopril (PRINIVIL,ZESTRIL) 10 MG tablet Take 1 tablet by mouth Daily.   • nitroglycerin (NITROSTAT) 0.4 MG SL tablet Place 0.4 mg under the tongue as needed for chest pain. Take no more than 3 doses in 15 minutes.   • ranitidine (ZANTAC) 150 MG capsule Take 1 capsule by mouth Every Evening.   • rosuvastatin (CRESTOR) 20 MG tablet Take 20 mg by mouth Daily.   • vitamin B-12 (CYANOCOBALAMIN) 1000 MCG tablet Take 1,000 mcg by mouth daily.     No current facility-administered medications on file prior to visit.          The following portions of the patient's history were reviewed and updated as appropriate: allergies, current medications, past family history, past medical history, past social history, past surgical history and problem list.    Review of Systems   Constitution: Negative.   HENT: Negative.  Negative for congestion.    Eyes: Negative.    Cardiovascular: Negative.  Negative for chest pain, cyanosis, dyspnea on exertion, irregular heartbeat, leg swelling, near-syncope, orthopnea, palpitations,  "paroxysmal nocturnal dyspnea and syncope.   Respiratory: Negative.  Negative for shortness of breath.    Hematologic/Lymphatic: Negative.    Musculoskeletal: Negative.    Gastrointestinal: Negative.    Neurological: Negative.  Negative for headaches.   Psychiatric/Behavioral: Positive for memory loss.          Objective:     /76 (BP Location: Left arm, Patient Position: Sitting, Cuff Size: Adult)   Pulse 58   Ht 180.3 cm (71\")   Wt 100 kg (221 lb)   SpO2 98%   BMI 30.82 kg/m²   Physical Exam   Constitutional: He appears well-developed and well-nourished. No distress.   HENT:   Head: Normocephalic and atraumatic.   Mouth/Throat: Oropharynx is clear and moist. No oropharyngeal exudate.   Eyes: Pupils are equal, round, and reactive to light. Conjunctivae and EOM are normal. No scleral icterus.   Neck: Normal range of motion. Neck supple. No JVD present. No tracheal deviation present. No thyromegaly present.   Cardiovascular: Normal rate, regular rhythm, normal heart sounds and intact distal pulses.  PMI is not displaced.  Exam reveals no gallop, no friction rub and no decreased pulses.    No murmur heard.  Pulses:       Carotid pulses are 3+ on the right side, and 3+ on the left side.       Radial pulses are 3+ on the right side, and 3+ on the left side.   Pulmonary/Chest: Effort normal and breath sounds normal. No respiratory distress. He has no wheezes. He has no rales. He exhibits no tenderness.   Abdominal: Soft. Bowel sounds are normal. He exhibits no distension, no abdominal bruit and no mass. There is no splenomegaly or hepatomegaly. There is no tenderness. There is no rebound and no guarding.   Musculoskeletal: Normal range of motion. He exhibits no edema, tenderness or deformity.   Lymphadenopathy:     He has no cervical adenopathy.   Neurological: He is alert. He has normal reflexes. No cranial nerve deficit. He exhibits normal muscle tone. Coordination normal.   Skin: Skin is warm and dry. No rash " noted. He is not diaphoretic. No erythema.   Psychiatric: He has a normal mood and affect. His behavior is normal. Judgment and thought content normal.         Lab Review  Lab work from [facility] reviewed and discussed with the patient  Procedures       I personally viewed and interpreted the patient's LAB data         Assessment:     1. Coronary artery disease involving native coronary artery of native heart without angina pectoris    2. Mixed hyperlipidemia    3. Essential hypertension    4. Type 2 diabetes mellitus without complication, without long-term current use of insulin (CMS/[facility])    5. Prostate CA (CMS/[facility])          Plan:      Patient has known coronary artery disease.  He is doing very well and is stable from cardiac standpoint.  No change in therapy was made.  He will continue antiplatelet therapy along with statin therapy and ACE inhibitor.    Blood pressure is also very well controlled no change in therapy    Lipids are very well controlled with Crestor and visual be continued    He complains of mild dementia patient was started on Namenda 5 twice a day.    Prozac was continued.    Healthy lifestyle was emphasized.  Follow-up scheduled        No Follow-up on file.

## 2018-12-03 RX ORDER — MEMANTINE HYDROCHLORIDE 5 MG/1
TABLET ORAL
Qty: 60 TABLET | Refills: 0 | Status: SHIPPED | OUTPATIENT
Start: 2018-12-03 | End: 2018-12-11

## 2018-12-11 ENCOUNTER — OFFICE VISIT (OUTPATIENT)
Dept: CARDIOLOGY | Facility: CLINIC | Age: 73
End: 2018-12-11

## 2018-12-11 VITALS
OXYGEN SATURATION: 98 % | WEIGHT: 233 LBS | SYSTOLIC BLOOD PRESSURE: 148 MMHG | DIASTOLIC BLOOD PRESSURE: 80 MMHG | HEART RATE: 67 BPM | BODY MASS INDEX: 32.62 KG/M2 | HEIGHT: 71 IN

## 2018-12-11 DIAGNOSIS — E78.2 MIXED HYPERLIPIDEMIA: Primary | ICD-10-CM

## 2018-12-11 DIAGNOSIS — R41.3 MEMORY LOSS: ICD-10-CM

## 2018-12-11 DIAGNOSIS — R53.83 FATIGUE, UNSPECIFIED TYPE: ICD-10-CM

## 2018-12-11 DIAGNOSIS — I10 ESSENTIAL HYPERTENSION: ICD-10-CM

## 2018-12-11 DIAGNOSIS — E66.09 CLASS 1 OBESITY DUE TO EXCESS CALORIES WITHOUT SERIOUS COMORBIDITY WITH BODY MASS INDEX (BMI) OF 32.0 TO 32.9 IN ADULT: ICD-10-CM

## 2018-12-11 DIAGNOSIS — I25.10 CORONARY ARTERY DISEASE INVOLVING NATIVE CORONARY ARTERY OF NATIVE HEART WITHOUT ANGINA PECTORIS: ICD-10-CM

## 2018-12-11 DIAGNOSIS — E55.9 VITAMIN D DEFICIENCY: ICD-10-CM

## 2018-12-11 PROCEDURE — 99214 OFFICE O/P EST MOD 30 MIN: CPT | Performed by: INTERNAL MEDICINE

## 2018-12-11 RX ORDER — DONEPEZIL HYDROCHLORIDE 10 MG/1
10 TABLET, FILM COATED ORAL NIGHTLY
Qty: 90 TABLET | Refills: 1 | Status: SHIPPED | OUTPATIENT
Start: 2018-12-11 | End: 2019-05-31 | Stop reason: SDUPTHER

## 2018-12-11 RX ORDER — MEMANTINE HYDROCHLORIDE 10 MG/1
10 TABLET ORAL 2 TIMES DAILY
Qty: 180 TABLET | Refills: 1 | Status: SHIPPED | OUTPATIENT
Start: 2018-12-11 | End: 2019-06-23 | Stop reason: SDUPTHER

## 2018-12-11 RX ORDER — LISINOPRIL 10 MG/1
10 TABLET ORAL 2 TIMES DAILY
Qty: 180 TABLET | Refills: 1 | Status: SHIPPED | OUTPATIENT
Start: 2018-12-11 | End: 2020-10-01 | Stop reason: SDUPTHER

## 2018-12-11 NOTE — PROGRESS NOTES
subjective     Chief Complaint   Patient presents with   • Hypertension   • Follow-up     History of Present Illness   Patient is here for follow-up.  He states that his blood pressure is running high.  He is taking lisinopril 10 mg daily.    He also has memory loss and is taking Namenda 5 mg twice a day.  Will need to increase Namenda to 10 twice a day and add Aricept 10 mg daily.    He has known coronary artery disease status post myocardial infarction and PCI to the RCA in 2005.  He is taking antiplatelet therapy, statin therapy however no beta blocker therapy.  Patient is not using beta blocker therapy because he had significant bradycardia down to heart rate of 48/m.    Lipids are very well controlled with Crestor 20 mg daily.    Patient also has lot of anxiety and is taking Prozac.    He is overweight.  Healthy lifestyle was discussed.  He also has history of prostate CA.    Past Surgical History:   Procedure Laterality Date   • CARDIAC CATHETERIZATION  08/2010   • CARDIOVASCULAR STRESS TEST  11/2014   • CARDIOVASCULAR STRESS TEST  11/2014   • COLONOSCOPY  09/01/2014   • COLONOSCOPY  09/2014   • CORONARY STENT PLACEMENT  2005   • CORONARY STENT PLACEMENT  2010   • ECHO - CONVERTED  08/2011   • OTHER SURGICAL HISTORY      Cath Stent Placement 2005 and 2010.   • PROSTATECTOMY      Radical     Family History   Problem Relation Age of Onset   • Coronary artery disease Other    • Cancer Other         Colon   • Alzheimer's disease Mother    • Cancer Sister    • No Known Problems Brother    • Heart disease Sister      Past Medical History:   Diagnosis Date   • Anxiety    • CAD (coronary artery disease)    • Caffeine use     2 Cups Daily.    • Depression    • Diabetes mellitus (CMS/[facility])    • Hyperlipidemia    • Hypertension    • Myocardial infarction (CMS/H[facility])    • Obesity    • Prostate cancer (CMS/[facility])      Patient Active Problem List   Diagnosis   • CAD (coronary artery disease) status post myocardial infarction and  PCI to the RCA in    • Diabetes mellitus (CMS/[facility])   • Hyperlipidemia   • Depression   • Essential hypertension   • Obesity   • Prostate CA (CMS/[facility])   • Memory loss       Social History     Tobacco Use   • Smoking status: Former Smoker     Last attempt to quit: 1991     Years since quittin.6   • Smokeless tobacco: Never Used   Substance Use Topics   • Alcohol use: No   • Drug use: No       Allergies   Allergen Reactions   • Penicillins    • Sulfa Antibiotics        Current Outpatient Medications on File Prior to Visit   Medication Sig   • aspirin 325 MG tablet Take 325 mg by mouth daily.   • cholecalciferol (VITAMIN D3) 1000 UNITS tablet Take 1,000 Units by mouth daily.   • FLUoxetine (PROzac) 40 MG capsule Take 1 capsule by mouth Daily.   • glucose blood test strip 1 each by Other route As Needed (prn). Use as instructed   • Lancets misc    • ranitidine (ZANTAC) 150 MG capsule Take 1 capsule by mouth Every Evening.   • rosuvastatin (CRESTOR) 20 MG tablet Take 1 tablet by mouth Daily.   • sildenafil (VIAGRA) 100 MG tablet Take 1 tablet by mouth Every 3 (Three) Days.   • vitamin B-12 (CYANOCOBALAMIN) 1000 MCG tablet Take 1,000 mcg by mouth daily.     No current facility-administered medications on file prior to visit.          The following portions of the patient's history were reviewed and updated as appropriate: allergies, current medications, past family history, past medical history, past social history, past surgical history and problem list.    Review of Systems   Constitution: Negative.   HENT: Negative.  Negative for congestion.    Eyes: Negative.    Cardiovascular: Negative.  Negative for chest pain, cyanosis, dyspnea on exertion, irregular heartbeat, leg swelling, near-syncope, orthopnea, palpitations, paroxysmal nocturnal dyspnea and syncope.   Respiratory: Negative.  Negative for shortness of breath.    Hematologic/Lymphatic: Negative.    Musculoskeletal: Negative.    Gastrointestinal:  "Negative.    Neurological: Negative.  Negative for headaches.   Psychiatric/Behavioral: Positive for depression and memory loss. The patient is nervous/anxious.           Objective:     /80 (BP Location: Left arm, Patient Position: Sitting)   Pulse 67   Ht 180.3 cm (71\")   Wt 106 kg (233 lb)   SpO2 98%   BMI 32.50 kg/m²   Physical Exam   Constitutional: He appears well-developed and well-nourished. No distress.   HENT:   Head: Normocephalic and atraumatic.   Mouth/Throat: Oropharynx is clear and moist. No oropharyngeal exudate.   Eyes: Conjunctivae and EOM are normal. Pupils are equal, round, and reactive to light. No scleral icterus.   Neck: Normal range of motion. Neck supple. No JVD present. No tracheal deviation present. No thyromegaly present.   Cardiovascular: Normal rate, regular rhythm, normal heart sounds and intact distal pulses. PMI is not displaced. Exam reveals no gallop, no friction rub and no decreased pulses.   No murmur heard.  Pulses:       Carotid pulses are 3+ on the right side, and 3+ on the left side.       Radial pulses are 3+ on the right side, and 3+ on the left side.   Pulmonary/Chest: Effort normal and breath sounds normal. No respiratory distress. He has no wheezes. He has no rales. He exhibits no tenderness.   Abdominal: Soft. Bowel sounds are normal. He exhibits no distension, no abdominal bruit and no mass. There is no splenomegaly or hepatomegaly. There is no tenderness. There is no rebound and no guarding.   Musculoskeletal: Normal range of motion. He exhibits no edema, tenderness or deformity.   Lymphadenopathy:     He has no cervical adenopathy.   Neurological: He is alert. He has normal reflexes. No cranial nerve deficit. He exhibits normal muscle tone. Coordination normal.   Skin: Skin is warm and dry. No rash noted. He is not diaphoretic. No erythema.   Psychiatric: He has a normal mood and affect. His behavior is normal. Judgment and thought content normal. "         Lab Review    Procedures       I personally viewed and interpreted the patient's LAB data         Assessment:     1. Mixed hyperlipidemia    2. Essential hypertension    3. Coronary artery disease involving native coronary artery of native heart without angina pectoris    4. Class 1 obesity due to excess calories without serious comorbidity with body mass index (BMI) of 32.0 to 32.9 in adult    5. Memory loss    6. Fatigue, unspecified type    7. Vitamin D deficiency          Plan:      Patient has hyperlipidemia he was advised to continue Crestor 20 mg daily.  Patient is significantly overweight.  Weight loss was again emphasized.  Healthy lifestyle and exercise program was also discussed.    Blood pressure is very poorly controlled.  He was advised to increase lisinopril 10 mg twice a day.    Because of memory loss Namenda dose was increased to 10 mg twice a day and Aricept 10 mg daily was added.    Patient is not taking any beta blocker therapy because of depression and bradycardia we check EKG next visit to see if he can take any beta blocker therapy.  Lab work was also scheduled.  Follow-up in 3 months.  Refills were given        No Follow-up on file.   How Severe Is This Condition?: moderate

## 2019-01-31 NOTE — PROGRESS NOTES
PROGRESS NOTE  Data:  Rose Singer came in 1/3/2017 for his regularly scheduled therapy session starting at 2:30pm. and ending at 3:30pm., with KALE GaldamezWDANIELA .  Pt. Reports depression has improved.     (Scales based on 0 - 10 with 10 being the worst)  Depression: 6 Anxiety: 8   Distress: 7 Sleep: 5   Tasks Completed on Time: 4 Mood: 4   Number of Panic Attacks: 5 Appetite: 0     Patient and his wife come in together reporting that he is exercising every day and taking his medication as prescribed feeling some better.  Patient reports though that he does feel obligated in has certain things that he has to do now.  Patient reports that he found out on Monday that his mother who is 87 years old in [place] was put in hospice.  Patient reports that he has several situations of people that he needs to take care of this week.  Patient reports that he still has the fear of dark and difficultity in focusing on taking care of himself.  Patient denies any thoughts to harm himself or others at present time.    Clinical Maneuvering/Intervention:  Applied Cognitive therapy and positive coping skills.  Allow patient to ventilation of his feelings regarding his mother.  Educated patient on the grief process and the 5 stages of grief.  Patient was able to identify for himself that he needed to go and be with his mother his brother and his sister to deal with his mother's passing.  Patient was encouraged to identify his negative thinking in that his continuation of attempting to take on responsibility is his way of being accepted and stating that he is good enough when he does everything that is expected of himself.  Patient was encouraged to set limits for himself in focus on taking care of himself and his family at present time which she agreed to do.  Assisted patient in reframing his negative thinking of I am not good enough to I am acceptable way I am.  Encouraged pt. to use the automatic negative  thought  worksheet.  Pt. was encouraged to use positive coping skills of sticking to a daily schedule, taking medication as prescribed, getting daily exercise, eating healthy, and applying positive self talk.  Reviewed the crisis safety plan to come to the emergency room if suicidal or homicidal.     Assessment     Patient's diagnosis is anxiety disorder unspecified type.  Patient having increased stress dealing with his mother's failing health and some anxiety ongoing anxiety with improved depression.  Denied Suicidal or Homicidal ideations. Ongoing treatment to prevent decompensation.         Mental Status Exam  Hygiene:  good  Dress:  casual  Attitude:  Cooperative  Motor Activity:  Appropriate  Speech:  Normal  Mood:  constricted  Affect:  depressed  Thought Processes:  Goal directed  Thought Content:  normal  Suicidal Thoughts:  denies  Homicidal Thoughts:  denies  Crisis Safety Plan: yes, to come to the emergency room.  Hallucinations:  denies    Patient's Support Network Includes:  wife    Plan     Patient is to return in 2 weeks for positive coping skills and cognitive therapy.  Patient is to apply positive coping skills of eating healthy, sticking to a daily schedule, getting daily exercise, and taking his medication as prescribed to decrease his depression and anxiety.  Patient is to focus on living 1 day at a time focusing on the things he can change instead of what he can't which is only himself.  Patient is to set limits on doing for other people to decrease his anxiety and reframe his negative thinking of I am acceptable way I am.         Return in about 2 weeks (around 1/17/2017).           right normal/left normal

## 2019-03-12 ENCOUNTER — OFFICE VISIT (OUTPATIENT)
Dept: CARDIOLOGY | Facility: CLINIC | Age: 74
End: 2019-03-12

## 2019-03-12 VITALS
SYSTOLIC BLOOD PRESSURE: 140 MMHG | HEART RATE: 58 BPM | OXYGEN SATURATION: 98 % | BODY MASS INDEX: 32.34 KG/M2 | WEIGHT: 231 LBS | HEIGHT: 71 IN | DIASTOLIC BLOOD PRESSURE: 78 MMHG

## 2019-03-12 DIAGNOSIS — R41.3 MEMORY LOSS: ICD-10-CM

## 2019-03-12 DIAGNOSIS — I25.10 CORONARY ARTERY DISEASE INVOLVING NATIVE CORONARY ARTERY OF NATIVE HEART WITHOUT ANGINA PECTORIS: Primary | ICD-10-CM

## 2019-03-12 DIAGNOSIS — E78.2 MIXED HYPERLIPIDEMIA: ICD-10-CM

## 2019-03-12 DIAGNOSIS — F32.4 MAJOR DEPRESSIVE DISORDER WITH SINGLE EPISODE, IN PARTIAL REMISSION (HCC): ICD-10-CM

## 2019-03-12 DIAGNOSIS — E66.09 CLASS 1 OBESITY DUE TO EXCESS CALORIES WITHOUT SERIOUS COMORBIDITY WITH BODY MASS INDEX (BMI) OF 32.0 TO 32.9 IN ADULT: ICD-10-CM

## 2019-03-12 DIAGNOSIS — Z91.09 ENVIRONMENTAL ALLERGIES: ICD-10-CM

## 2019-03-12 DIAGNOSIS — I10 ESSENTIAL HYPERTENSION: ICD-10-CM

## 2019-03-12 PROCEDURE — 99214 OFFICE O/P EST MOD 30 MIN: CPT | Performed by: INTERNAL MEDICINE

## 2019-03-12 RX ORDER — MONTELUKAST SODIUM 10 MG/1
10 TABLET ORAL NIGHTLY
Qty: 30 TABLET | Refills: 2 | Status: SHIPPED | OUTPATIENT
Start: 2019-03-12 | End: 2019-06-02 | Stop reason: SDUPTHER

## 2019-03-12 NOTE — PROGRESS NOTES
subjective     Chief Complaint   Patient presents with   • Hyperlipidemia   • Coronary Artery Disease   • Follow-up     History of Present Illness  Patient is 73 years old white male who complains of seasonal allergies.  He is taking Claritin but is not helping.  Patient has sinus and head congestion but no fever chills or drainage.    He also complains of swelling of the left elbow.  Is also mildly painful to touch.    He has known coronary artery disease patient has history of myocardial infarction with intervention in 2005 and then in 2011  Patient seem to be doing very well he denies any chest pain palpitations or shortness of breath.  He apparently is scheduled to have stress test done next week.    Patient also has hypertension which is very well controlled now with medications.  He is taking lisinopril 10 mg twice a day.    Hyperlipidemia is being treated with Crestor 20 mg daily.  There are no drug side effects.  Patient is trying to lose weight he has lost 2 pounds since December but gained 9 pounds since last year.    Patient also has memory loss and is taking Aricept and Namenda which will be continued.  Anxiety is controlled with Prozac 40 mg daily.    Patient also has vitamin D and B12 deficiency he is not taking vitamin D regularly labs will be reviewed.    Past Surgical History:   Procedure Laterality Date   • CARDIAC CATHETERIZATION  08/2010   • CARDIOVASCULAR STRESS TEST  11/2014   • CARDIOVASCULAR STRESS TEST  11/2014   • COLONOSCOPY  09/01/2014   • COLONOSCOPY  09/2014   • CORONARY STENT PLACEMENT  2005   • CORONARY STENT PLACEMENT  2010   • ECHO - CONVERTED  08/2011   • OTHER SURGICAL HISTORY      Cath Stent Placement 2005 and 2010.   • PROSTATECTOMY      Radical     Family History   Problem Relation Age of Onset   • Coronary artery disease Other    • Cancer Other         Colon   • Alzheimer's disease Mother    • Cancer Sister    • No Known Problems Brother    • Heart disease Sister      Past  Medical History:   Diagnosis Date   • Anxiety    • CAD (coronary artery disease)    • Caffeine use     2 Cups Daily.    • Depression    • Diabetes mellitus (CMS/HCC)    • Hyperlipidemia    • Hypertension    • Myocardial infarction (CMS/HCC)    • Obesity    • Prostate cancer (CMS/HCC)      Patient Active Problem List   Diagnosis   • CAD (coronary artery disease) status post myocardial infarction and PCI to the RCA in    • Diabetes mellitus (CMS/HCC)   • Hyperlipidemia   • Depression   • Essential hypertension   • Obesity   • Prostate CA (CMS/HCC)   • Memory loss   • Environmental allergies       Social History     Tobacco Use   • Smoking status: Former Smoker     Last attempt to quit: 1991     Years since quittin.8   • Smokeless tobacco: Never Used   Substance Use Topics   • Alcohol use: No   • Drug use: No       Allergies   Allergen Reactions   • Penicillins    • Sulfa Antibiotics        Current Outpatient Medications on File Prior to Visit   Medication Sig   • aspirin 325 MG tablet Take 325 mg by mouth daily.   • cholecalciferol (VITAMIN D3) 1000 UNITS tablet Take 1,000 Units by mouth daily.   • donepezil (ARICEPT) 10 MG tablet Take 1 tablet by mouth Every Night.   • FLUoxetine (PROzac) 40 MG capsule Take 1 capsule by mouth Daily.   • glucose blood test strip 1 each by Other route As Needed (prn). Use as instructed   • Lancets misc    • lisinopril (PRINIVIL,ZESTRIL) 10 MG tablet Take 1 tablet by mouth 2 (Two) Times a Day.   • memantine (NAMENDA) 10 MG tablet Take 1 tablet by mouth 2 (Two) Times a Day.   • ranitidine (ZANTAC) 150 MG capsule Take 1 capsule by mouth Every Evening.   • rosuvastatin (CRESTOR) 20 MG tablet Take 1 tablet by mouth Daily.   • sildenafil (VIAGRA) 100 MG tablet Take 1 tablet by mouth Every 3 (Three) Days.   • vitamin B-12 (CYANOCOBALAMIN) 1000 MCG tablet Take 1,000 mcg by mouth daily.     No current facility-administered medications on file prior to visit.          The following  "portions of the patient's history were reviewed and updated as appropriate: allergies, current medications, past family history, past medical history, past social history, past surgical history and problem list.    Review of Systems   Constitution: Negative.   HENT: Positive for congestion.    Eyes: Negative.    Cardiovascular: Negative.  Negative for chest pain, cyanosis, dyspnea on exertion, irregular heartbeat, leg swelling, near-syncope, orthopnea, palpitations, paroxysmal nocturnal dyspnea and syncope.   Respiratory: Negative.  Negative for shortness of breath.    Hematologic/Lymphatic: Negative.    Musculoskeletal: Positive for arthritis and joint swelling.   Gastrointestinal: Negative.    Neurological: Negative.  Negative for headaches.          Objective:     /78 (BP Location: Left arm, Patient Position: Sitting)   Pulse 58   Ht 180.3 cm (71\")   Wt 105 kg (231 lb)   SpO2 98%   BMI 32.22 kg/m²   Physical Exam      Lab Review        Procedures       I personally viewed and interpreted the patient's LAB data         Assessment:     1. Coronary artery disease involving native coronary artery of native heart without angina pectoris    2. Essential hypertension    3. Mixed hyperlipidemia    4. Major depressive disorder with single episode, in partial remission (CMS/[facility])    5. Memory loss    6. Class 1 obesity due to excess calories without serious comorbidity with body mass index (BMI) of 32.0 to 32.9 in adult    7. Environmental allergies          Plan:     Patient has environmental allergies.  He is taking Claritin.  He was advised to take Singulair 10 mg daily.    He complains of swelling left elbow which appears to be olecranon bursitis.  Swelling is minimal.  Patient was advised that it could be drained and steroid injections could be given and will need orthopedic consultation.  Patient states that he is not that bad at this time he will let me know when he wants consultation.    Lab work reviewed " and discussed with the patient  He will continue current medications.  Vitamin D is low he was advised to take vitamin D3 1000 daily.  Follow-up scheduled healthy lifestyle and weight loss was emphasized        Return in about 3 months (around 6/12/2019).

## 2019-03-20 RX ORDER — FLUOXETINE HYDROCHLORIDE 40 MG/1
CAPSULE ORAL
Qty: 90 CAPSULE | Refills: 0 | Status: SHIPPED | OUTPATIENT
Start: 2019-03-20 | End: 2019-06-16 | Stop reason: SDUPTHER

## 2019-04-03 RX ORDER — ROSUVASTATIN CALCIUM 20 MG/1
TABLET, COATED ORAL
Qty: 90 TABLET | Refills: 0 | Status: SHIPPED | OUTPATIENT
Start: 2019-04-03 | End: 2019-06-23 | Stop reason: SDUPTHER

## 2019-04-17 ENCOUNTER — TELEPHONE (OUTPATIENT)
Dept: CARDIOLOGY | Facility: CLINIC | Age: 74
End: 2019-04-17

## 2019-04-17 DIAGNOSIS — M70.32 BURSITIS OF LEFT ELBOW, UNSPECIFIED BURSA: Primary | ICD-10-CM

## 2019-04-17 NOTE — TELEPHONE ENCOUNTER
PATIENT CAME INTO FRONT OFFICE AND SAID HE HAD SHOWED HIS ELBOW TO DR RAPP THE LAST TIME HE WAS SEEN AND DR RAPP TOLD HIM THAT HE MAY HAVE TO HAVE IT DRAINED AND GET AN INJECTION. HE SAID IT IS GETTING WORSE AND FEELS A LOT WORSE. AND HAS GOTTEN A LOT BIGGER. PATIENT IS READY TO HAVE THE ELBOW LOOKED AT, I TOLD HIM WE WOULD HAVE TO SEND HIM SOMEWHERE TO A SPECIALIST TREAT HIM FOR THE ELBOW.

## 2019-04-30 DIAGNOSIS — M25.522 LEFT ELBOW PAIN: Primary | ICD-10-CM

## 2019-05-01 ENCOUNTER — OFFICE VISIT (OUTPATIENT)
Dept: ORTHOPEDIC SURGERY | Facility: CLINIC | Age: 74
End: 2019-05-01

## 2019-05-01 ENCOUNTER — HOSPITAL ENCOUNTER (OUTPATIENT)
Dept: GENERAL RADIOLOGY | Facility: HOSPITAL | Age: 74
Discharge: HOME | End: 2019-05-01
Admitting: ORTHOPAEDIC SURGERY

## 2019-05-01 VITALS
BODY MASS INDEX: 32.34 KG/M2 | HEART RATE: 59 BPM | HEIGHT: 71 IN | SYSTOLIC BLOOD PRESSURE: 129 MMHG | DIASTOLIC BLOOD PRESSURE: 74 MMHG | WEIGHT: 231 LBS

## 2019-05-01 DIAGNOSIS — M25.522 LEFT ELBOW PAIN: ICD-10-CM

## 2019-05-01 DIAGNOSIS — M70.22 OLECRANON BURSITIS OF LEFT ELBOW: Primary | ICD-10-CM

## 2019-05-01 PROCEDURE — 20605 DRAIN/INJ JOINT/BURSA W/O US: CPT | Performed by: ORTHOPAEDIC SURGERY

## 2019-05-01 PROCEDURE — 73080 X-RAY EXAM OF ELBOW: CPT

## 2019-05-01 PROCEDURE — 73080 X-RAY EXAM OF ELBOW: CPT | Performed by: RADIOLOGY

## 2019-05-01 PROCEDURE — 99203 OFFICE O/P NEW LOW 30 MIN: CPT | Performed by: ORTHOPAEDIC SURGERY

## 2019-05-01 RX ADMIN — LIDOCAINE HYDROCHLORIDE 5 ML: 20 INJECTION, SOLUTION INFILTRATION; PERINEURAL at 14:10

## 2019-05-01 RX ADMIN — METHYLPREDNISOLONE ACETATE 40 MG: 40 INJECTION, SUSPENSION INTRA-ARTICULAR; INTRALESIONAL; INTRAMUSCULAR; SOFT TISSUE at 14:10

## 2019-05-01 NOTE — PROGRESS NOTES
New Patient Visit      Patient: Rose Singer  YOB: 1945  Date of Encounter: 05/01/2019        Chief Complaint:   Chief Complaint   Patient presents with   • Left Elbow - Edema, Pain       HPI:   Rose Singer, 73 y.o. male, referred by Adryan Rodney MD presents today with swelling posterior aspect of his left elbow about a months duration.  He denies injury to his left elbow recent or remote.  He has not experienced fever chills or night sweats.  He has a history of prostate cancer and is scheduled to follow-up at the [facility].    Active Problem List:  Patient Active Problem List   Diagnosis   • CAD (coronary artery disease) status post myocardial infarction and PCI to the RCA in 2005   • Diabetes mellitus (CMS/HCC)   • Hyperlipidemia   • Depression   • Essential hypertension   • Obesity   • Prostate CA (CMS/HCC)   • Memory loss   • Environmental allergies       Past Medical History:  Past Medical History:   Diagnosis Date   • Anxiety    • CAD (coronary artery disease)    • Caffeine use     2 Cups Daily.    • Depression    • Diabetes mellitus (CMS/HCC)    • Hyperlipidemia    • Hypertension    • Myocardial infarction (CMS/HCC)    • Obesity    • Prostate cancer (CMS/HCC)        Past Surgical History:  Past Surgical History:   Procedure Laterality Date   • CARDIAC CATHETERIZATION  08/2010   • CARDIOVASCULAR STRESS TEST  11/2014   • CARDIOVASCULAR STRESS TEST  11/2014   • COLONOSCOPY  09/01/2014   • COLONOSCOPY  09/2014   • CORONARY STENT PLACEMENT  2005   • CORONARY STENT PLACEMENT  2010   • ECHO - CONVERTED  08/2011   • OTHER SURGICAL HISTORY      Cath Stent Placement 2005 and 2010.   • PROSTATECTOMY      Radical       Family History:  Family History   Problem Relation Age of Onset   • Coronary artery disease Other    • Cancer Other         Colon   • Alzheimer's disease Mother    • Cancer Sister    • No Known Problems Brother    • Heart disease Sister        Social History:  Social  History     Socioeconomic History   • Marital status:      Spouse name: Not on file   • Number of children: Not on file   • Years of education: Not on file   • Highest education level: Not on file   Tobacco Use   • Smoking status: Former Smoker     Last attempt to quit: 1991     Years since quittin.9   • Smokeless tobacco: Never Used   Substance and Sexual Activity   • Alcohol use: No   • Drug use: No   • Sexual activity: Defer     Body mass index is 32.23 kg/m².    Medications:  Current Outpatient Medications   Medication Sig Dispense Refill   • aspirin 325 MG tablet Take 325 mg by mouth daily.     • cholecalciferol (VITAMIN D3) 1000 UNITS tablet Take 1,000 Units by mouth daily.     • donepezil (ARICEPT) 10 MG tablet Take 1 tablet by mouth Every Night. 90 tablet 1   • FLUoxetine (PROzac) 40 MG capsule TAKE ONE CAPSULE BY MOUTH DAILY 90 capsule 0   • glucose blood test strip 1 each by Other route As Needed (prn). Use as instructed 100 each 1   • Lancets misc      • lisinopril (PRINIVIL,ZESTRIL) 10 MG tablet Take 1 tablet by mouth 2 (Two) Times a Day. 180 tablet 1   • memantine (NAMENDA) 10 MG tablet Take 1 tablet by mouth 2 (Two) Times a Day. 180 tablet 1   • montelukast (SINGULAIR) 10 MG tablet Take 1 tablet by mouth Every Night. 30 tablet 2   • ranitidine (ZANTAC) 150 MG capsule Take 1 capsule by mouth Every Evening. 90 capsule 1   • rosuvastatin (CRESTOR) 20 MG tablet TAKE ONE TABLET BY MOUTH DAILY 90 tablet 0   • sildenafil (VIAGRA) 100 MG tablet Take 1 tablet by mouth Every 3 (Three) Days. 30 tablet 2   • vitamin B-12 (CYANOCOBALAMIN) 1000 MCG tablet Take 1,000 mcg by mouth daily.       No current facility-administered medications for this visit.        Allergies:  Allergies   Allergen Reactions   • Penicillins    • Sulfa Antibiotics        Review of Systems:   Review of Systems   Constitutional: Negative.    HENT: Negative.    Eyes: Negative.    Respiratory: Negative.    Cardiovascular:  "Negative.    Gastrointestinal: Negative.    Endocrine: Negative.    Genitourinary: Negative.    Musculoskeletal: Positive for arthralgias.   Skin: Negative.    Allergic/Immunologic: Negative.    Neurological: Negative.    Hematological: Negative.    Psychiatric/Behavioral: Negative.        Physical Exam:   Physical Exam  GENERAL: 73 y.o. male, alert and oriented X 3 in no acute distress.   Visit Vitals  /74 (BP Location: Right arm, Patient Position: Sitting, Cuff Size: Adult)   Pulse 59   Ht 180.3 cm (70.98\")   Wt 105 kg (231 lb)   BMI 32.23 kg/m²     Musculoskeletal:   Left elbow evaluation reveals approximately 5 cm fluctuant mass posterior aspect of the elbow consistent with olecranon bursitis.  There is slight prominence along the tip of the olecranon.  There is no surrounding erythema or increased warmth.  He demonstrates full elbow flexion extension pronation supination and normal neurovascular status to the left arm.    Radiology/Labs:   Radiographs left elbow show multiple metallic clips from previous distal biceps avulsion treated surgically.  There is bony ossicle tip of the olecranon this is separate from the olecranon.    Assessment & Plan:   73 y.o. male olecranon bursitis left elbow without evidence of infection.  His radiographs do show an ossified fragment posterior aspect of the olecranon likely originally small spur which is .  Today after discussion his left olecranon bursa was aspirated removing 9 cc of serous fluid nonpurulent he was then injected with Depo-Medrol 40 mg lidocaine block.  I anticipate good response.  Should he fail to improve he will return.  If he continues to develop further accumulation within the olecranon bursa he may need excision of the small fragment.  We will see how he responds he will follow-up as needed.      ICD-10-CM ICD-9-CM   1. Left elbow pain M25.522 719.42   2. Olecranon bursitis of left elbow M70.22 726.33     Left Olecranon Bursa  Date/Time: " 5/1/2019 2:10 PM  Performed by: Diego Mcghee MD  Authorized by: Diego Mcghee MD   Consent: Verbal consent obtained.  Risks and benefits: risks, benefits and alternatives were discussed  Consent given by: patient  Patient understanding: patient states understanding of the procedure being performed  Test results: test results available and properly labeled  Site marked: the operative site was marked  Imaging studies: imaging studies available  Patient identity confirmed: verbally with patient  Preparation: Patient was prepped and draped in the usual sterile fashion.  Local anesthesia used: yes  Anesthesia: local infiltration    Anesthesia:  Local anesthesia used: yes  Anesthetic total: 5 mL    Sedation:  Patient sedated: no    Patient tolerance: Patient tolerated the procedure well with no immediate complications  Medications administered: 5 mL lidocaine 2%; 40 mg methylPREDNISolone acetate 40 MG/ML            Cc:   Adryan Rodney MD              This document has been electronically signed by Diego Mcghee MD   May 5, 2019 6:09 PM

## 2019-05-06 RX ORDER — RANITIDINE 150 MG/1
TABLET ORAL
Qty: 90 TABLET | Refills: 2 | Status: SHIPPED | OUTPATIENT
Start: 2019-05-06 | End: 2019-05-08 | Stop reason: SDUPTHER

## 2019-05-08 RX ORDER — RANITIDINE 150 MG/1
150 TABLET ORAL EVERY EVENING
Qty: 90 TABLET | Refills: 2 | Status: SHIPPED | OUTPATIENT
Start: 2019-05-08 | End: 2019-11-11

## 2019-05-12 RX ORDER — METHYLPREDNISOLONE ACETATE 40 MG/ML
40 INJECTION, SUSPENSION INTRA-ARTICULAR; INTRALESIONAL; INTRAMUSCULAR; SOFT TISSUE
Status: COMPLETED | OUTPATIENT
Start: 2019-05-01 | End: 2019-05-01

## 2019-05-12 RX ORDER — LIDOCAINE HYDROCHLORIDE 20 MG/ML
5 INJECTION, SOLUTION INFILTRATION; PERINEURAL
Status: COMPLETED | OUTPATIENT
Start: 2019-05-01 | End: 2019-05-01

## 2019-05-31 RX ORDER — DONEPEZIL HYDROCHLORIDE 10 MG/1
TABLET, FILM COATED ORAL
Qty: 90 TABLET | Refills: 0 | Status: SHIPPED | OUTPATIENT
Start: 2019-05-31 | End: 2019-09-02 | Stop reason: SDUPTHER

## 2019-06-03 RX ORDER — MONTELUKAST SODIUM 10 MG/1
TABLET ORAL
Qty: 90 TABLET | Refills: 1 | Status: SHIPPED | OUTPATIENT
Start: 2019-06-03 | End: 2019-10-30 | Stop reason: SDUPTHER

## 2019-06-17 RX ORDER — FLUOXETINE HYDROCHLORIDE 40 MG/1
CAPSULE ORAL
Qty: 90 CAPSULE | Refills: 0 | Status: SHIPPED | OUTPATIENT
Start: 2019-06-17 | End: 2019-09-16 | Stop reason: SDUPTHER

## 2019-06-24 RX ORDER — ROSUVASTATIN CALCIUM 20 MG/1
TABLET, COATED ORAL
Qty: 90 TABLET | Refills: 0 | Status: SHIPPED | OUTPATIENT
Start: 2019-06-24 | End: 2019-09-30 | Stop reason: SDUPTHER

## 2019-06-24 RX ORDER — MEMANTINE HYDROCHLORIDE 10 MG/1
TABLET ORAL
Qty: 180 TABLET | Refills: 0 | Status: SHIPPED | OUTPATIENT
Start: 2019-06-24 | End: 2019-09-30 | Stop reason: SDUPTHER

## 2019-08-14 ENCOUNTER — OFFICE VISIT (OUTPATIENT)
Dept: CARDIOLOGY | Facility: CLINIC | Age: 74
End: 2019-08-14

## 2019-08-14 VITALS
DIASTOLIC BLOOD PRESSURE: 70 MMHG | OXYGEN SATURATION: 97 % | WEIGHT: 234 LBS | HEART RATE: 55 BPM | BODY MASS INDEX: 32.76 KG/M2 | SYSTOLIC BLOOD PRESSURE: 112 MMHG | HEIGHT: 71 IN

## 2019-08-14 DIAGNOSIS — E55.9 VITAMIN D DEFICIENCY: ICD-10-CM

## 2019-08-14 DIAGNOSIS — E11.9 TYPE 2 DIABETES MELLITUS WITHOUT COMPLICATION, WITHOUT LONG-TERM CURRENT USE OF INSULIN (HCC): ICD-10-CM

## 2019-08-14 DIAGNOSIS — R41.3 MEMORY LOSS: ICD-10-CM

## 2019-08-14 DIAGNOSIS — E78.2 MIXED HYPERLIPIDEMIA: ICD-10-CM

## 2019-08-14 DIAGNOSIS — I25.10 CORONARY ARTERY DISEASE INVOLVING NATIVE CORONARY ARTERY OF NATIVE HEART WITHOUT ANGINA PECTORIS: Primary | ICD-10-CM

## 2019-08-14 DIAGNOSIS — I10 ESSENTIAL HYPERTENSION: ICD-10-CM

## 2019-08-14 DIAGNOSIS — E66.09 CLASS 1 OBESITY DUE TO EXCESS CALORIES WITHOUT SERIOUS COMORBIDITY WITH BODY MASS INDEX (BMI) OF 32.0 TO 32.9 IN ADULT: ICD-10-CM

## 2019-08-14 PROCEDURE — 99214 OFFICE O/P EST MOD 30 MIN: CPT | Performed by: INTERNAL MEDICINE

## 2019-08-14 NOTE — PROGRESS NOTES
subjective     Chief Complaint   Patient presents with   • Coronary Artery Disease   • Hyperlipidemia   • Follow-up     History of Present Illness  Patient is 74 years old white male who is here for follow-up of multiple chronic medical problems  Patient has known coronary artery disease prior myocardial infarction and PCI.  He denies any chest pain palpitations or shortness of breath.      Blood pressure is very well controlled.  He is taking lisinopril regularly.    Hyperlipidemia controlled with Crestor 20 mg daily.  Patient has depression which is better with Prozac.    Patient also has chronic memory loss and is doing better with Aricept and Namenda.  He also is on vitamin B12 and vitamin D.  Patient has not been able to lose any weight.  He is significantly overweight    Past Surgical History:   Procedure Laterality Date   • CARDIAC CATHETERIZATION  08/2010   • CARDIOVASCULAR STRESS TEST  11/2014   • CARDIOVASCULAR STRESS TEST  11/2014   • COLONOSCOPY  09/01/2014   • COLONOSCOPY  09/2014   • CORONARY STENT PLACEMENT  2005   • CORONARY STENT PLACEMENT  2010   • ECHO - CONVERTED  08/2011   • OTHER SURGICAL HISTORY      Cath Stent Placement 2005 and 2010.   • PROSTATECTOMY      Radical     Family History   Problem Relation Age of Onset   • Coronary artery disease Other    • Cancer Other         Colon   • Alzheimer's disease Mother    • Cancer Sister    • No Known Problems Brother    • Heart disease Sister      Past Medical History:   Diagnosis Date   • Anxiety    • CAD (coronary artery disease)    • Caffeine use     2 Cups Daily.    • Depression    • Diabetes mellitus (CMS/HCC)    • Hyperlipidemia    • Hypertension    • Myocardial infarction (CMS/HCC)    • Obesity    • Prostate cancer (CMS/HCC)      Patient Active Problem List   Diagnosis   • CAD (coronary artery disease) status post myocardial infarction and PCI to the RCA in 2005   • Diabetes mellitus (CMS/HCC)   • Hyperlipidemia   • Depression   • Essential  hypertension   • Obesity   • Prostate CA (CMS/HCC)   • Memory loss   • Environmental allergies   • Vitamin D deficiency       Social History     Tobacco Use   • Smoking status: Former Smoker     Last attempt to quit: 1991     Years since quittin.2   • Smokeless tobacco: Never Used   Substance Use Topics   • Alcohol use: No   • Drug use: No       Allergies   Allergen Reactions   • Penicillins    • Sulfa Antibiotics        Current Outpatient Medications on File Prior to Visit   Medication Sig   • aspirin 325 MG tablet Take 325 mg by mouth daily.   • cholecalciferol (VITAMIN D3) 1000 UNITS tablet Take 1,000 Units by mouth daily.   • donepezil (ARICEPT) 10 MG tablet TAKE ONE TABLET BY MOUTH EVERY NIGHT   • FLUoxetine (PROzac) 40 MG capsule TAKE ONE CAPSULE BY MOUTH DAILY   • glucose blood test strip 1 each by Other route As Needed (prn). Use as instructed   • Lancets misc    • lisinopril (PRINIVIL,ZESTRIL) 10 MG tablet Take 1 tablet by mouth 2 (Two) Times a Day.   • memantine (NAMENDA) 10 MG tablet TAKE ONE TABLET BY MOUTH TWICE A DAY   • montelukast (SINGULAIR) 10 MG tablet TAKE ONE TABLET BY MOUTH ONCE NIGHTLY   • raNITIdine (ZANTAC) 150 MG tablet Take 1 tablet by mouth Every Evening.   • rosuvastatin (CRESTOR) 20 MG tablet TAKE ONE TABLET BY MOUTH DAILY   • sildenafil (VIAGRA) 100 MG tablet Take 1 tablet by mouth Every 3 (Three) Days.   • vitamin B-12 (CYANOCOBALAMIN) 1000 MCG tablet Take 1,000 mcg by mouth daily.     No current facility-administered medications on file prior to visit.          The following portions of the patient's history were reviewed and updated as appropriate: allergies, current medications, past family history, past medical history, past social history, past surgical history and problem list.    Review of Systems   Constitution: Negative.   HENT: Negative.  Negative for congestion.    Eyes: Negative.    Cardiovascular: Negative.  Negative for chest pain, cyanosis, dyspnea on  "exertion, irregular heartbeat, leg swelling, near-syncope, orthopnea, palpitations, paroxysmal nocturnal dyspnea and syncope.   Respiratory: Negative.  Negative for shortness of breath.    Hematologic/Lymphatic: Negative.    Musculoskeletal: Negative.    Gastrointestinal: Negative.    Neurological: Negative.  Negative for headaches.          Objective:     /70 (BP Location: Left arm, Patient Position: Sitting)   Pulse 55   Ht 180.1 cm (70.9\")   Wt 106 kg (234 lb)   SpO2 97%   BMI 32.73 kg/m²   Physical Exam   Constitutional: He appears well-developed and well-nourished. No distress.   HENT:   Head: Normocephalic and atraumatic.   Mouth/Throat: Oropharynx is clear and moist. No oropharyngeal exudate.   Eyes: Conjunctivae and EOM are normal. Pupils are equal, round, and reactive to light. No scleral icterus.   Neck: Normal range of motion. Neck supple. No JVD present. No tracheal deviation present. No thyromegaly present.   Cardiovascular: Normal rate, regular rhythm, normal heart sounds and intact distal pulses. PMI is not displaced. Exam reveals no gallop, no friction rub and no decreased pulses.   No murmur heard.  Pulses:       Carotid pulses are 3+ on the right side, and 3+ on the left side.       Radial pulses are 3+ on the right side, and 3+ on the left side.   Pulmonary/Chest: Effort normal and breath sounds normal. No respiratory distress. He has no wheezes. He has no rales. He exhibits no tenderness.   Abdominal: Soft. Bowel sounds are normal. He exhibits no distension, no abdominal bruit and no mass. There is no splenomegaly or hepatomegaly. There is no tenderness. There is no rebound and no guarding.   Musculoskeletal: Normal range of motion. He exhibits no edema, tenderness or deformity.   Lymphadenopathy:     He has no cervical adenopathy.   Neurological: He is alert. He has normal reflexes. No cranial nerve deficit. He exhibits normal muscle tone. Coordination normal.   Skin: Skin is warm and " dry. No rash noted. He is not diaphoretic. No erythema.   Psychiatric: He has a normal mood and affect. His behavior is normal. Judgment and thought content normal.         Lab Review    Lab work scheduled    Procedures       I personally viewed and interpreted the patient's LAB data         Assessment:     1. Coronary artery disease involving native coronary artery of native heart without angina pectoris    2. Mixed hyperlipidemia    3. Memory loss    4. Type 2 diabetes mellitus without complication, without long-term current use of insulin (CMS/[facility])    5. Essential hypertension    6. Vitamin D deficiency    7. Class 1 obesity due to excess calories without serious comorbidity with body mass index (BMI) of 32.0 to 32.9 in adult          Plan:     Patient is stable from cardiac standpoint  He was advised to continue antiplatelet therapy along with statin therapy.    Patient has not been able to tolerate beta-blocker therapy in the past.  Aricept and Namenda was continued for memory loss.  Plan blood pressure is very well controlled , lisinopril was continued.  Healthy lifestyle weight loss was emphasized.  Lab work and follow-up scheduled    No Follow-up on file.

## 2019-09-03 RX ORDER — DONEPEZIL HYDROCHLORIDE 10 MG/1
TABLET, FILM COATED ORAL
Qty: 90 TABLET | Refills: 0 | Status: SHIPPED | OUTPATIENT
Start: 2019-09-03 | End: 2019-12-06 | Stop reason: SDUPTHER

## 2019-09-17 RX ORDER — FLUOXETINE HYDROCHLORIDE 40 MG/1
CAPSULE ORAL
Qty: 90 CAPSULE | Refills: 0 | Status: SHIPPED | OUTPATIENT
Start: 2019-09-17 | End: 2019-11-11 | Stop reason: SDUPTHER

## 2019-09-30 RX ORDER — ROSUVASTATIN CALCIUM 20 MG/1
TABLET, COATED ORAL
Qty: 90 TABLET | Refills: 0 | Status: SHIPPED | OUTPATIENT
Start: 2019-09-30 | End: 2019-12-27

## 2019-09-30 RX ORDER — MEMANTINE HYDROCHLORIDE 10 MG/1
TABLET ORAL
Qty: 180 TABLET | Refills: 0 | Status: SHIPPED | OUTPATIENT
Start: 2019-09-30 | End: 2019-12-27

## 2019-10-31 RX ORDER — MONTELUKAST SODIUM 10 MG/1
TABLET ORAL
Qty: 90 TABLET | Refills: 0 | Status: SHIPPED | OUTPATIENT
Start: 2019-10-31 | End: 2020-01-28

## 2019-11-11 ENCOUNTER — OFFICE VISIT (OUTPATIENT)
Dept: CARDIOLOGY | Facility: CLINIC | Age: 74
End: 2019-11-11

## 2019-11-11 VITALS
WEIGHT: 230 LBS | RESPIRATION RATE: 16 BRPM | HEART RATE: 57 BPM | OXYGEN SATURATION: 98 % | DIASTOLIC BLOOD PRESSURE: 74 MMHG | HEIGHT: 71 IN | SYSTOLIC BLOOD PRESSURE: 126 MMHG | BODY MASS INDEX: 32.2 KG/M2

## 2019-11-11 DIAGNOSIS — R41.3 MEMORY LOSS: ICD-10-CM

## 2019-11-11 DIAGNOSIS — R06.02 SHORTNESS OF BREATH: Primary | ICD-10-CM

## 2019-11-11 DIAGNOSIS — I10 ESSENTIAL HYPERTENSION: ICD-10-CM

## 2019-11-11 DIAGNOSIS — I25.10 CORONARY ARTERY DISEASE INVOLVING NATIVE CORONARY ARTERY OF NATIVE HEART WITHOUT ANGINA PECTORIS: ICD-10-CM

## 2019-11-11 DIAGNOSIS — E78.2 MIXED HYPERLIPIDEMIA: ICD-10-CM

## 2019-11-11 DIAGNOSIS — F32.4 MAJOR DEPRESSIVE DISORDER WITH SINGLE EPISODE, IN PARTIAL REMISSION (HCC): ICD-10-CM

## 2019-11-11 PROCEDURE — 99214 OFFICE O/P EST MOD 30 MIN: CPT | Performed by: INTERNAL MEDICINE

## 2019-11-11 RX ORDER — FLUOXETINE HYDROCHLORIDE 20 MG/1
20 CAPSULE ORAL DAILY
Qty: 90 CAPSULE | Refills: 2 | Status: SHIPPED | OUTPATIENT
Start: 2019-11-11 | End: 2020-08-10 | Stop reason: ALTCHOICE

## 2019-11-11 RX ORDER — FAMOTIDINE 40 MG/1
40 TABLET, FILM COATED ORAL DAILY
Qty: 90 TABLET | Refills: 0 | Status: SHIPPED | OUTPATIENT
Start: 2019-11-11 | End: 2020-08-10

## 2019-11-11 NOTE — PROGRESS NOTES
subjective     Chief Complaint   Patient presents with   • Shortness of Breath     with exertion   • Coronary Artery Disease     History of Present Illness  Patient is 74 years old white male who has history of prostate cancer.  Patient underwent radiation therapy.  His wife states that he is not doing very well and is quite a bit short of breath.  Patient himself stated that he has no chest pain or tightness but minimal exertion makes him out of breath.  His sleep is quite restless and has very bad dreams according to him.  He wakes up still tired and fatigued as if he did not get enough sleep.    Patient has known coronary artery disease with prior myocardial infarction and intervention to RCA 2005.  Lately he has not have any chest discomfort.  He had a stress test done recently which was negative for ischemia.  Blood pressure has been very well controlled.  He is taking lisinopril.  Patient also has hyperlipidemia doing very well with Crestor.  Patient has memory loss which is being treated with the Aricept 10 mg daily and Namenda 10 mg twice daily.  Anxiety is much better with the Prozac 40 mg daily.    Past Surgical History:   Procedure Laterality Date   • CARDIAC CATHETERIZATION  08/2010   • CARDIOVASCULAR STRESS TEST  11/2014   • CARDIOVASCULAR STRESS TEST  11/2014   • COLONOSCOPY  09/01/2014   • COLONOSCOPY  09/2014   • CORONARY STENT PLACEMENT  2005   • CORONARY STENT PLACEMENT  2010   • ECHO - CONVERTED  08/2011   • OTHER SURGICAL HISTORY      Cath Stent Placement 2005 and 2010.   • PROSTATECTOMY      Radical     Family History   Problem Relation Age of Onset   • Coronary artery disease Other    • Cancer Other         Colon   • Alzheimer's disease Mother    • Cancer Sister    • No Known Problems Brother    • Heart disease Sister      Past Medical History:   Diagnosis Date   • Anxiety    • CAD (coronary artery disease)    • Caffeine use     2 Cups Daily.    • Depression    • Diabetes mellitus (CMS/[facility])     • Hyperlipidemia    • Hypertension    • Myocardial infarction (CMS/[facility])    • Obesity    • Prostate cancer (CMS/[facility])      Patient Active Problem List   Diagnosis   • CAD (coronary artery disease) status post myocardial infarction and PCI to the RCA in    • Diabetes mellitus (CMS/[facility])   • Hyperlipidemia   • Depression   • Essential hypertension   • Obesity   • Prostate CA (CMS/[facility])   • Memory loss   • Environmental allergies   • Vitamin D deficiency   • Shortness of breath       Social History     Tobacco Use   • Smoking status: Former Smoker     Last attempt to quit: 1991     Years since quittin.5   • Smokeless tobacco: Never Used   Substance Use Topics   • Alcohol use: No   • Drug use: No       Allergies   Allergen Reactions   • Penicillins    • Sulfa Antibiotics        Current Outpatient Medications on File Prior to Visit   Medication Sig   • aspirin 325 MG tablet Take 325 mg by mouth daily.   • cholecalciferol (VITAMIN D3) 1000 UNITS tablet Take 1,000 Units by mouth daily.   • donepezil (ARICEPT) 10 MG tablet TAKE ONE TABLET BY MOUTH EVERY NIGHT   • glucose blood test strip 1 each by Other route As Needed (prn). Use as instructed   • Lancets misc    • lisinopril (PRINIVIL,ZESTRIL) 10 MG tablet Take 1 tablet by mouth 2 (Two) Times a Day.   • memantine (NAMENDA) 10 MG tablet TAKE ONE TABLET BY MOUTH TWICE A DAY   • montelukast (SINGULAIR) 10 MG tablet TAKE ONE TABLET BY MOUTH EVERY NIGHT   • rosuvastatin (CRESTOR) 20 MG tablet TAKE ONE TABLET BY MOUTH DAILY   • vitamin B-12 (CYANOCOBALAMIN) 1000 MCG tablet Take 1,000 mcg by mouth daily.   • [DISCONTINUED] FLUoxetine (PROzac) 40 MG capsule TAKE ONE CAPSULE BY MOUTH DAILY   • [DISCONTINUED] raNITIdine (ZANTAC) 150 MG tablet Take 1 tablet by mouth Every Evening.   • [DISCONTINUED] sildenafil (VIAGRA) 100 MG tablet Take 1 tablet by mouth Every 3 (Three) Days.     No current facility-administered medications on file prior to visit.          The following  "portions of the patient's history were reviewed and updated as appropriate: allergies, current medications, past family history, past medical history, past social history, past surgical history and problem list.    Review of Systems   Constitution: Positive for malaise/fatigue.   HENT: Negative.  Negative for congestion.    Eyes: Negative.    Cardiovascular: Positive for dyspnea on exertion. Negative for chest pain, cyanosis, irregular heartbeat, leg swelling, near-syncope, orthopnea, palpitations, paroxysmal nocturnal dyspnea and syncope.   Respiratory: Positive for shortness of breath.    Hematologic/Lymphatic: Negative.    Skin: Negative.    Musculoskeletal: Negative.    Gastrointestinal: Negative.    Neurological: Positive for excessive daytime sleepiness. Negative for headaches.        Bad dreams          Objective:     /74 (BP Location: Left arm)   Pulse 57   Resp 16   Ht 180.1 cm (70.91\")   Wt 104 kg (230 lb)   SpO2 98%   BMI 32.16 kg/m²   Physical Exam   Constitutional: He appears well-developed and well-nourished. No distress.   HENT:   Head: Normocephalic and atraumatic.   Mouth/Throat: Oropharynx is clear and moist. No oropharyngeal exudate.   Eyes: Conjunctivae and EOM are normal. Pupils are equal, round, and reactive to light. No scleral icterus.   Neck: Normal range of motion. Neck supple. No JVD present. No tracheal deviation present. No thyromegaly present.   Cardiovascular: Normal rate, regular rhythm, normal heart sounds and intact distal pulses. PMI is not displaced. Exam reveals no gallop, no friction rub and no decreased pulses.   No murmur heard.  Pulses:       Carotid pulses are 3+ on the right side, and 3+ on the left side.       Radial pulses are 3+ on the right side, and 3+ on the left side.   Pulmonary/Chest: Effort normal and breath sounds normal. No respiratory distress. He has no wheezes. He has no rales. He exhibits no tenderness.   Abdominal: Soft. Bowel sounds are normal. " He exhibits no distension, no abdominal bruit and no mass. There is no splenomegaly or hepatomegaly. There is no tenderness. There is no rebound and no guarding.   Musculoskeletal: Normal range of motion. He exhibits no edema, tenderness or deformity.   Lymphadenopathy:     He has no cervical adenopathy.   Neurological: He is alert. He has normal reflexes. No cranial nerve deficit. He exhibits normal muscle tone. Coordination normal.   Skin: Skin is warm and dry. No rash noted. He is not diaphoretic. No erythema.   Psychiatric: He has a normal mood and affect. His behavior is normal. Judgment and thought content normal.         Lab Review        Procedures                 I personally viewed and interpreted the patient's LAB data         Assessment:     1. Shortness of breath    2. Coronary artery disease involving native coronary artery of native heart without angina pectoris    3. Essential hypertension    4. Mixed hyperlipidemia    5. Major depressive disorder with single episode, in partial remission (CMS/HCC)    6. Memory loss          Plan:     Complains of shortness of breath and dyspnea on exertion.  He had a normal stress echocardiogram done on March 15, 2019.  There was no evidence of ischemia or heart failure.  There was no significant valvular heart disease.  Because of shortness of breath is not clear, will get pulmonary consultation.    Hyperlipidemia is very well controlled Crestor was continued.  Because of memory loss Namenda and Aricept was continued.  Blood pressure is very well controlled with lisinopril 10 mg twice a day which was continued    She has known coronary artery disease status post RCA stenting.  Antiplatelet therapy statin therapy continued.  Patient has not been able to tolerate beta-blockers.  Follow-up scheduled        No Follow-up on file.

## 2019-12-09 RX ORDER — DONEPEZIL HYDROCHLORIDE 10 MG/1
TABLET, FILM COATED ORAL
Qty: 90 TABLET | Refills: 0 | Status: SHIPPED | OUTPATIENT
Start: 2019-12-09 | End: 2020-06-02

## 2019-12-27 RX ORDER — ROSUVASTATIN CALCIUM 20 MG/1
TABLET, COATED ORAL
Qty: 90 TABLET | Refills: 0 | Status: SHIPPED | OUTPATIENT
Start: 2019-12-27 | End: 2020-05-27

## 2019-12-27 RX ORDER — MEMANTINE HYDROCHLORIDE 10 MG/1
TABLET ORAL
Qty: 180 TABLET | Refills: 0 | Status: SHIPPED | OUTPATIENT
Start: 2019-12-27 | End: 2020-06-30

## 2020-01-28 RX ORDER — MONTELUKAST SODIUM 10 MG/1
TABLET ORAL
Qty: 90 TABLET | Refills: 0 | Status: SHIPPED | OUTPATIENT
Start: 2020-01-28 | End: 2020-05-05

## 2020-02-05 ENCOUNTER — HOSPITAL ENCOUNTER (OUTPATIENT)
Dept: GENERAL RADIOLOGY | Facility: HOSPITAL | Age: 75
Discharge: HOME | End: 2020-02-05
Admitting: INTERNAL MEDICINE

## 2020-02-05 ENCOUNTER — OFFICE VISIT (OUTPATIENT)
Dept: PULMONOLOGY | Facility: CLINIC | Age: 75
End: 2020-02-05

## 2020-02-05 VITALS
SYSTOLIC BLOOD PRESSURE: 156 MMHG | HEART RATE: 56 BPM | HEIGHT: 71 IN | OXYGEN SATURATION: 98 % | TEMPERATURE: 98 F | WEIGHT: 242 LBS | BODY MASS INDEX: 33.88 KG/M2 | DIASTOLIC BLOOD PRESSURE: 82 MMHG

## 2020-02-05 DIAGNOSIS — Z85.46 HISTORY OF PROSTATE CANCER: ICD-10-CM

## 2020-02-05 DIAGNOSIS — R06.02 SOB (SHORTNESS OF BREATH) ON EXERTION: ICD-10-CM

## 2020-02-05 DIAGNOSIS — J41.0 SIMPLE CHRONIC BRONCHITIS (HCC): ICD-10-CM

## 2020-02-05 DIAGNOSIS — R06.02 SOB (SHORTNESS OF BREATH) ON EXERTION: Primary | ICD-10-CM

## 2020-02-05 PROCEDURE — 71046 X-RAY EXAM CHEST 2 VIEWS: CPT | Performed by: RADIOLOGY

## 2020-02-05 PROCEDURE — 99214 OFFICE O/P EST MOD 30 MIN: CPT | Performed by: INTERNAL MEDICINE

## 2020-02-05 PROCEDURE — 71046 X-RAY EXAM CHEST 2 VIEWS: CPT

## 2020-02-05 PROCEDURE — 94664 DEMO&/EVAL PT USE INHALER: CPT | Performed by: INTERNAL MEDICINE

## 2020-02-05 RX ORDER — ALBUTEROL SULFATE 90 UG/1
2 AEROSOL, METERED RESPIRATORY (INHALATION) EVERY 4 HOURS PRN
Qty: 6.7 G | Refills: 11 | Status: SHIPPED | OUTPATIENT
Start: 2020-02-05 | End: 2021-11-10 | Stop reason: SDUPTHER

## 2020-02-05 NOTE — PROGRESS NOTES
Subjective    Rose Singer presents for the following Shortness of Breath  .    History of Present Illness   Mr. Singer is a very pleasant 74-year-old gentleman with a past medical history ofCAD status post myocardial infarction and PCI to RCA 2005, essential hypertension and prostate cancer status post radiation and [facility].    He reports gradual worsening of shortness of breath from last 8 months.  Symptoms of shortness of breath are always present on exertion.  Shortness of breath is not associated with any chest pain.  He does complain of intermittent wheezing and dry cough.  He denies any fever or chills.  His symptoms of shortness of breath increases with activity and degrees on rest.  He denies any discomfort biting down flat.  He denies any severe attack of shortness of breath at nighttime.  He denies any night sweats or weight loss.  He finishes radiation treatment for prostate cancer.  As per him the PET scan was negative for any metastasis.  The disease was localized at prostate only.  He complains of a dry cough.  He denies any blood in his cough.  His wheezing happens mostly during the daytime.  Wheezing is triggered by weather change.  He denies any complain of morning headaches, excessive daytime sleepiness and unrefreshed sleep.       Review of system  ROS  General: Negative for fatigue or fever  Psychological: Negative for anxiety or depression  Ophthalmic: Negative for blurry vision or double vision  ENT: Negative for epistaxis or hearing changes  Allergy and immunology: Negative for hives or nasal congestion  Hematological and lymphatic: Negative for jaundice or night sweats  Endocrine: Negative for lethargy, temperature intolerance  Respiratory: Positive for shortness of breath.  Positive for cough.  Positive for wheezing.  Cardiovascular: Negative for chest pain.  Positive for dyspnea on exertion  Gastrointestinal: No abdominal pain, no change in bowel habits  Musculoskeletal:  Negative for joint swelling and joint pain  Neurological: No TIA or stroke symptoms  Dermatological: Negative for acne or eczema    Past Medical History:  Past Medical History:   Diagnosis Date   • Anxiety    • CAD (coronary artery disease)    • Caffeine use     2 Cups Daily.    • Depression    • Diabetes mellitus (CMS/HCC)    • Hyperlipidemia    • Hypertension    • Myocardial infarction (CMS/HCC)    • Obesity    • Prostate cancer (CMS/HCC)        Family History:  Family History   Problem Relation Age of Onset   • Coronary artery disease Other    • Cancer Other         Colon   • Alzheimer's disease Mother    • Cancer Sister    • No Known Problems Brother    • Heart disease Sister        Social History:  Social History     Tobacco Use   • Smoking status: Former Smoker     Last attempt to quit: 1991     Years since quittin.7   • Smokeless tobacco: Never Used   Substance Use Topics   • Alcohol use: No       Current Medications:  Current Outpatient Medications   Medication Sig Dispense Refill   • aspirin 325 MG tablet Take 325 mg by mouth daily.     • cholecalciferol (VITAMIN D3) 1000 UNITS tablet Take 1,000 Units by mouth daily.     • donepezil (ARICEPT) 10 MG tablet TAKE ONE TABLET BY MOUTH EVERY NIGHT 90 tablet 0   • famotidine (PEPCID) 40 MG tablet Take 1 tablet by mouth Daily. 90 tablet 0   • FLUoxetine (PROzac) 20 MG capsule Take 1 capsule by mouth Daily. 90 capsule 2   • glucose blood test strip 1 each by Other route As Needed (prn). Use as instructed 100 each 1   • Lancets misc      • lisinopril (PRINIVIL,ZESTRIL) 10 MG tablet Take 1 tablet by mouth 2 (Two) Times a Day. 180 tablet 1   • memantine (NAMENDA) 10 MG tablet TAKE ONE TABLET BY MOUTH TWICE A  tablet 0   • montelukast (SINGULAIR) 10 MG tablet TAKE ONE TABLET BY MOUTH EVERY NIGHT 90 tablet 0   • rosuvastatin (CRESTOR) 20 MG tablet TAKE ONE TABLET BY MOUTH DAILY 90 tablet 0   • vitamin B-12 (CYANOCOBALAMIN) 1000 MCG tablet Take 1,000 mcg  "by mouth daily.     • albuterol sulfate  (90 Base) MCG/ACT inhaler Inhale 2 puffs Every 4 (Four) Hours As Needed for Wheezing. 6.7 g 11   • tiotropium (SPIRIVA) 18 MCG per inhalation capsule Place 1 capsule into inhaler and inhale Daily. 30 capsule 11     No current facility-administered medications for this visit.        Allergies:  Allergies   Allergen Reactions   • Penicillins    • Sulfa Antibiotics        Vitals:  /82   Pulse 56   Temp 98 °F (36.7 °C) (Oral)   Ht 180.3 cm (71\")   Wt 110 kg (242 lb)   SpO2 98%   BMI 33.75 kg/m²     Results for orders placed or performed in visit on 01/18/17   CK   Result Value Ref Range    Creatine Kinase 76 24 - 204 U/L   Comprehensive metabolic panel   Result Value Ref Range    Glucose 136 (H) 70 - 110 mg/dL    BUN 11 7 - 21 mg/dL    Creatinine 1.01 0.43 - 1.29 mg/dL    Sodium 145 135 - 153 mmol/L    Potassium 4.8 3.5 - 5.3 mmol/L    Chloride 107 99 - 112 mmol/L    CO2 32.1 (H) 24.3 - 31.9 mmol/L    Calcium 9.8 7.7 - 10.0 mg/dL    Total Protein 7.0 6.0 - 8.0 g/dL    Albumin 4.30 3.40 - 4.80 g/dL    ALT (SGPT) 24 10 - 44 U/L    AST (SGOT) 22 10 - 34 U/L    Alkaline Phosphatase 50 46 - 116 U/L    Total Bilirubin 0.7 0.2 - 1.8 mg/dL    eGFR Non African Amer 73 >60 mL/min/1.73    Globulin 2.7 gm/dL    A/G Ratio 1.6 1.5 - 2.5 g/dL    BUN/Creatinine Ratio 10.9 7.0 - 25.0    Anion Gap 5.9 3.6 - 11.2 mmol/L   Hemoglobin A1c   Result Value Ref Range    Hemoglobin A1C 6.00 (H) 4.50 - 5.70 %   Lipid panel   Result Value Ref Range    Total Cholesterol 146 0 - 200 mg/dL    Triglycerides 121 0 - 150 mg/dL    HDL Cholesterol 31 (L) 60 - 100 mg/dL    LDL Cholesterol  91 0 - 100 mg/dL    VLDL Cholesterol 24.2 mg/dL    LDL/HDL Ratio 2.93    MicroAlbumin, urine, random   Result Value Ref Range    Microalbumin, Urine 15.2 mg/L   CBC Auto Differential   Result Value Ref Range    WBC 6.87 4.50 - 12.50 10*3/mm3    RBC 5.26 4.70 - 6.10 10*6/mm3    Hemoglobin 15.8 14.0 - 18.0 g/dL    " Hematocrit 46.2 42.0 - 52.0 %    MCV 87.8 80.0 - 94.0 fL    MCH 30.0 27.0 - 33.0 pg    MCHC 34.2 33.0 - 37.0 g/dL    RDW 13.4 11.5 - 14.5 %    RDW-SD 43.0 37.0 - 54.0 fl    MPV 11.8 (H) 6.0 - 10.0 fL    Platelets 162 130 - 400 10*3/mm3    Neutrophil % 68.9 40.0 - 75.0 %    Lymphocyte % 19.8 16.0 - 46.0 %    Monocyte % 8.3 0.0 - 12.0 %    Eosinophil % 2.3 0.0 - 7.0 %    Basophil % 0.4 0.0 - 2.0 %    Immature Grans % 0.3 0.0 - 0.5 %    Neutrophils, Absolute 4.73 1.40 - 6.50 10*3/mm3    Lymphocytes, Absolute 1.36 1.00 - 3.00 10*3/mm3    Monocytes, Absolute 0.57 0.10 - 0.90 10*3/mm3    Eosinophils, Absolute 0.16 0.00 - 0.70 10*3/mm3    Basophils, Absolute 0.03 0.00 - 0.30 10*3/mm3    Immature Grans, Absolute 0.02 0.00 - 0.03 10*3/mm3   Osmolality, Calculated   Result Value Ref Range    Osmolality Calc 290.2 273.0 - 305.0 mOsm/kg       Objective   Physical Exam:  Physical Exam    General Appearance:  In no acute distress and comfortable.    HEENT: Normocephalic, atraumatic, normal right and the left external ear.  Normal nose.  Lungs:  Normal effort and normal respiratory rate.  Negative for rales.  Negative for wheezes.  Negative for rhonchi.    Heart: Normal rate.  Regular rhythm.  S1 normal and S2 normal.    Abdomen: Abdomen is soft.  Bowel sounds are normal.   There is no abdominal tenderness.     Extremities: Normal range of motion.  Positive for dependent edema    Pulses: Distal pulses are intact.  There are no decreased pulses.    Neurological: Patient is alert and oriented to person, place and time.  Normal strength.    Pupils:  Pupils are equal, round, and reactive to light.    Skin:  Warm and dry.        I have reviewed the past medical history, past surgical history, family history and social history of the patient.        Labs:  No results found for: PHART, [id number], PO2ART, ATG3PVF, BASEEXCESS, M4IRVQRH  Lab Results   Component Value Date    GLUCOSE 136 (H) 01/18/2017    CALCIUM 9.8 01/18/2017      01/18/2017    K 4.8 01/18/2017    CO2 32.1 (H) 01/18/2017     01/18/2017    BUN 11 01/18/2017    CREATININE 1.01 01/18/2017    EGFRIFNONA 73 01/18/2017    BCR 10.9 01/18/2017    ANIONGAP 5.9 01/18/2017     Lab Results   Component Value Date    WBC 6.87 01/18/2017    HGB 15.8 01/18/2017    HCT 46.2 01/18/2017    MCV 87.8 01/18/2017     01/18/2017              Assessment/Plan    Diagnosis Plan   1. SOB (shortness of breath) on exertion  XR Chest 2 View    Full Pulmonary Function Test With Bronchodilator    albuterol sulfate  (90 Base) MCG/ACT inhaler    tiotropium (SPIRIVA) 18 MCG per inhalation capsule   2. Simple chronic bronchitis (CMS/HCC)  XR Chest 2 View    Full Pulmonary Function Test With Bronchodilator    albuterol sulfate  (90 Base) MCG/ACT inhaler    tiotropium (SPIRIVA) 18 MCG per inhalation capsule   3. History of prostate cancer   we will follow the chest x-ray.  If chest x-ray showed any infiltrates, then I will proceed with VQ scan and CT scan of the chest.         Immunization:  He is up-to-date on his pneumonia and influenza vaccination.       Inhaler technique demonstration/discussion:  I have extensively discussed the steps.  In summary, the steps were discussed in the following order. Patient was advised to rinse the mouth after steroid inhalation to prevent fungal mucositis.  · Remove the cap from the inhaler and shake well.    · Breathe out all the way.    · Place the mouthpiece of the inhaler between your teeth and seal your lips tightly around it.    · As you start to breath in slowly, press down on the canister one time.   · Keep breathing in as slowly and deeply as you can.    · It should take about 5 seconds for you to completely breathe in.    · Hold your breath for 10 seconds(count to 10 slowly) to allow the medication to reach the airways of the lung.    · Repeat the above steps for each puff.    · Wait about 1 minute between the puffs.    · Replace the cap on  the inhaler when finished.      Follow up in 3 months and as needed.

## 2020-02-10 ENCOUNTER — OFFICE VISIT (OUTPATIENT)
Dept: CARDIOLOGY | Facility: CLINIC | Age: 75
End: 2020-02-10

## 2020-02-10 DIAGNOSIS — I25.10 CORONARY ARTERY DISEASE INVOLVING NATIVE CORONARY ARTERY OF NATIVE HEART WITHOUT ANGINA PECTORIS: ICD-10-CM

## 2020-02-10 DIAGNOSIS — R53.82 CHRONIC FATIGUE: ICD-10-CM

## 2020-02-10 DIAGNOSIS — I10 ESSENTIAL HYPERTENSION: ICD-10-CM

## 2020-02-10 DIAGNOSIS — E11.9 TYPE 2 DIABETES MELLITUS WITHOUT COMPLICATION, WITHOUT LONG-TERM CURRENT USE OF INSULIN (HCC): ICD-10-CM

## 2020-02-10 DIAGNOSIS — E66.09 CLASS 1 OBESITY DUE TO EXCESS CALORIES WITHOUT SERIOUS COMORBIDITY WITH BODY MASS INDEX (BMI) OF 32.0 TO 32.9 IN ADULT: ICD-10-CM

## 2020-02-10 DIAGNOSIS — E55.9 VITAMIN D DEFICIENCY: ICD-10-CM

## 2020-02-10 DIAGNOSIS — E78.2 MIXED HYPERLIPIDEMIA: Primary | ICD-10-CM

## 2020-02-10 PROCEDURE — G0009 ADMIN PNEUMOCOCCAL VACCINE: HCPCS | Performed by: INTERNAL MEDICINE

## 2020-02-10 PROCEDURE — 90732 PPSV23 VACC 2 YRS+ SUBQ/IM: CPT | Performed by: INTERNAL MEDICINE

## 2020-02-10 PROCEDURE — 99214 OFFICE O/P EST MOD 30 MIN: CPT | Performed by: INTERNAL MEDICINE

## 2020-02-10 NOTE — PROGRESS NOTES
subjective     Chief Complaint   Patient presents with   • Coronary Artery Disease     follow up, no complaints   • Shortness of Breath     improved with inhalers      History of Present Illness    Patient is 74 years old white male who is here for follow-up.  Patient has history of coronary artery disease status post myocardial infarction and PCI.  He is doing very well denies any chest pain or palpitations   Shortness of breath is much better with inhalers.  Patient saw pulmonologist.    He also has hyperlipidemia and is taking Crestor.  He needs lab work which will be scheduled for next visit.    Blood pressure is very well controlled with lisinopril.    Blood sugar is running slightly high around 1 40-1 50, but patient does not like metformin because it gives him severe diarrhea.    Memory issues are better with Aricept and Namenda which he is taking regularly.  Patient also has anxiety and is taking Prozac.  GERD symptoms are better with the Pepcid.    Past Surgical History:   Procedure Laterality Date   • CARDIAC CATHETERIZATION  08/2010   • CARDIOVASCULAR STRESS TEST  11/2014   • CARDIOVASCULAR STRESS TEST  11/2014   • COLONOSCOPY  09/01/2014   • COLONOSCOPY  09/2014   • CORONARY STENT PLACEMENT  2005   • CORONARY STENT PLACEMENT  2010   • ECHO - CONVERTED  08/2011   • OTHER SURGICAL HISTORY      Cath Stent Placement 2005 and 2010.   • PROSTATECTOMY      Radical     Family History   Problem Relation Age of Onset   • Coronary artery disease Other    • Cancer Other         Colon   • Alzheimer's disease Mother    • Cancer Sister    • No Known Problems Brother    • Heart disease Sister      Past Medical History:   Diagnosis Date   • Anxiety    • CAD (coronary artery disease)    • Caffeine use     2 Cups Daily.    • Depression    • Diabetes mellitus (CMS/HCC)    • Hyperlipidemia    • Hypertension    • Myocardial infarction (CMS/HCC)    • Obesity    • Prostate cancer (CMS/HCC)      Patient Active Problem List  "  Diagnosis   • CAD (coronary artery disease) status post myocardial infarction and PCI to the RCA in    • Diabetes mellitus (CMS/[facility])   • Hyperlipidemia   • Depression   • Essential hypertension   • Obesity   • Prostate CA (CMS/[facility])   • Memory loss   • Environmental allergies   • Vitamin D deficiency   • Shortness of breath       Social History     Tobacco Use   • Smoking status: Former Smoker     Last attempt to quit: 1991     Years since quittin.7   • Smokeless tobacco: Never Used   Substance Use Topics   • Alcohol use: No   • Drug use: No       Allergies   Allergen Reactions   • Penicillins Other (See Comments)     Yrs ago went \"out of it\"     • Sulfa Antibiotics Hives       Current Outpatient Medications on File Prior to Visit   Medication Sig   • albuterol sulfate  (90 Base) MCG/ACT inhaler Inhale 2 puffs Every 4 (Four) Hours As Needed for Wheezing.   • aspirin 325 MG tablet Take 325 mg by mouth daily.   • cholecalciferol (VITAMIN D3) 1000 UNITS tablet Take 1,000 Units by mouth daily.   • donepezil (ARICEPT) 10 MG tablet TAKE ONE TABLET BY MOUTH EVERY NIGHT   • famotidine (PEPCID) 40 MG tablet Take 1 tablet by mouth Daily.   • FLUoxetine (PROzac) 20 MG capsule Take 1 capsule by mouth Daily.   • glucose blood test strip 1 each by Other route As Needed (prn). Use as instructed   • Lancets misc    • lisinopril (PRINIVIL,ZESTRIL) 10 MG tablet Take 1 tablet by mouth 2 (Two) Times a Day.   • memantine (NAMENDA) 10 MG tablet TAKE ONE TABLET BY MOUTH TWICE A DAY   • montelukast (SINGULAIR) 10 MG tablet TAKE ONE TABLET BY MOUTH EVERY NIGHT   • rosuvastatin (CRESTOR) 20 MG tablet TAKE ONE TABLET BY MOUTH DAILY   • tiotropium (SPIRIVA) 18 MCG per inhalation capsule Place 1 capsule into inhaler and inhale Daily.   • vitamin B-12 (CYANOCOBALAMIN) 1000 MCG tablet Take 1,000 mcg by mouth daily.     No current facility-administered medications on file prior to visit.          The following portions of the " patient's history were reviewed and updated as appropriate: allergies, current medications, past family history, past medical history, past social history, past surgical history and problem list.    Review of Systems   Constitution: Negative.   HENT: Negative.  Negative for congestion.    Eyes: Negative.    Cardiovascular: Negative.  Negative for chest pain, cyanosis, dyspnea on exertion, irregular heartbeat, leg swelling, near-syncope, orthopnea, palpitations, paroxysmal nocturnal dyspnea and syncope.   Respiratory: Negative.  Negative for shortness of breath.    Endocrine:        Blood sugar is running high   Hematologic/Lymphatic: Negative.    Musculoskeletal: Negative.    Gastrointestinal: Negative.    Neurological: Negative.  Negative for headaches.          Objective:     There were no vitals taken for this visit.  Physical Exam   Constitutional: He appears well-developed and well-nourished. No distress.   HENT:   Head: Normocephalic and atraumatic.   Mouth/Throat: Oropharynx is clear and moist. No oropharyngeal exudate.   Eyes: Pupils are equal, round, and reactive to light. Conjunctivae and EOM are normal. No scleral icterus.   Neck: Normal range of motion. Neck supple. No JVD present. No tracheal deviation present. No thyromegaly present.   Cardiovascular: Normal rate, regular rhythm, normal heart sounds and intact distal pulses. PMI is not displaced. Exam reveals no gallop, no friction rub and no decreased pulses.   No murmur heard.  Pulses:       Carotid pulses are 3+ on the right side, and 3+ on the left side.       Radial pulses are 3+ on the right side, and 3+ on the left side.   Pulmonary/Chest: Effort normal and breath sounds normal. No respiratory distress. He has no wheezes. He has no rales. He exhibits no tenderness.   Abdominal: Soft. Bowel sounds are normal. He exhibits no distension, no abdominal bruit and no mass. There is no splenomegaly or hepatomegaly. There is no tenderness. There is no  rebound and no guarding.   Musculoskeletal: Normal range of motion. He exhibits no edema, tenderness or deformity.   Lymphadenopathy:     He has no cervical adenopathy.   Neurological: He is alert. He has normal reflexes. No cranial nerve deficit. He exhibits normal muscle tone. Coordination normal.   Skin: Skin is warm and dry. No rash noted. He is not diaphoretic. No erythema.   Psychiatric: He has a normal mood and affect. His behavior is normal. Judgment and thought content normal.         Lab Review  Lab Results   Component Value Date     01/18/2017    K 4.8 01/18/2017     01/18/2017    BUN 11 01/18/2017    CREATININE 1.01 01/18/2017    GLUCOSE 136 (H) 01/18/2017    CALCIUM 9.8 01/18/2017    ALT 24 01/18/2017    ALKPHOS 50 01/18/2017    LABIL2 1.6 02/10/2016     Lab Results   Component Value Date    CKTOTAL 76 01/18/2017     Lab Results   Component Value Date    WBC 6.87 01/18/2017    HGB 15.8 01/18/2017    HCT 46.2 01/18/2017     01/18/2017     No results found for: INR  No results found for: MG  Lab Results   Component Value Date    PSA 0.10 02/10/2016     No results found for: BNP  Lab Results   Component Value Date    CHLPL 143 02/10/2016    CHOL 146 01/18/2017    TRIG 121 01/18/2017    HDL 31 (L) 01/18/2017    VLDL 24.2 01/18/2017    LDLHDL 2.93 01/18/2017     Lab Results   Component Value Date    LDL 91 01/18/2017    LDL 88 08/12/2016       Procedures       I personally viewed and interpreted the patient's LAB data         Assessment:     1. Mixed hyperlipidemia    2. Essential hypertension    3. Coronary artery disease involving native coronary artery of native heart without angina pectoris    4. Vitamin D deficiency    5. Class 1 obesity due to excess calories without serious comorbidity with body mass index (BMI) of 32.0 to 32.9 in adult    6. Type 2 diabetes mellitus without complication, without long-term current use of insulin (CMS/[facility])    7. Chronic fatigue          Plan:      Blood sugar is running high.  Dietary restriction healthy lifestyle and weight loss was emphasized.  Patient cannot take metformin will start on Januvia.  Lab work scheduled for next visit.  Patient has hyperlipidemia he will continue Crestor.  Anxiety is controlled on Prozac which was continued.  Aricept and Namenda was also continued  Weight loss was emphasized.  Vitamin D was continued lab work scheduled for next visit follow-up in 3 months  Pneumonia shot Prevnar 23 was given        No follow-ups on file.

## 2020-02-12 ENCOUNTER — TELEPHONE (OUTPATIENT)
Dept: CARDIOLOGY | Facility: CLINIC | Age: 75
End: 2020-02-12

## 2020-02-12 RX ORDER — GLIMEPIRIDE 1 MG/1
TABLET ORAL
Qty: 45 TABLET | Refills: 2 | Status: SHIPPED | OUTPATIENT
Start: 2020-02-12 | End: 2020-08-10 | Stop reason: SDUPTHER

## 2020-02-12 NOTE — TELEPHONE ENCOUNTER
Called patient to inform him of med change.  Sent new RX to [facility] pharmacy.

## 2020-02-20 ENCOUNTER — HOSPITAL ENCOUNTER (OUTPATIENT)
Dept: RESPIRATORY THERAPY | Facility: HOSPITAL | Age: 75
Discharge: HOME | End: 2020-02-20
Admitting: INTERNAL MEDICINE

## 2020-02-20 VITALS — OXYGEN SATURATION: 98 % | RESPIRATION RATE: 16 BRPM | HEART RATE: 71 BPM

## 2020-02-20 DIAGNOSIS — R06.02 SOB (SHORTNESS OF BREATH) ON EXERTION: ICD-10-CM

## 2020-02-20 DIAGNOSIS — J41.0 SIMPLE CHRONIC BRONCHITIS (HCC): ICD-10-CM

## 2020-02-20 PROCEDURE — 94729 DIFFUSING CAPACITY: CPT | Performed by: INTERNAL MEDICINE

## 2020-02-20 PROCEDURE — 94727 GAS DIL/WSHOT DETER LNG VOL: CPT

## 2020-02-20 PROCEDURE — 94727 GAS DIL/WSHOT DETER LNG VOL: CPT | Performed by: INTERNAL MEDICINE

## 2020-02-20 PROCEDURE — 94640 AIRWAY INHALATION TREATMENT: CPT

## 2020-02-20 PROCEDURE — 94060 EVALUATION OF WHEEZING: CPT

## 2020-02-20 PROCEDURE — 94799 UNLISTED PULMONARY SVC/PX: CPT

## 2020-02-20 PROCEDURE — 94060 EVALUATION OF WHEEZING: CPT | Performed by: INTERNAL MEDICINE

## 2020-02-20 PROCEDURE — 94729 DIFFUSING CAPACITY: CPT

## 2020-02-20 RX ORDER — ALBUTEROL SULFATE 2.5 MG/3ML
2.5 SOLUTION RESPIRATORY (INHALATION) ONCE
Status: COMPLETED | OUTPATIENT
Start: 2020-02-20 | End: 2020-02-20

## 2020-02-20 RX ADMIN — ALBUTEROL SULFATE 2.5 MG: 2.5 SOLUTION RESPIRATORY (INHALATION) at 09:38

## 2020-05-05 RX ORDER — MONTELUKAST SODIUM 10 MG/1
TABLET ORAL
Qty: 90 TABLET | Refills: 0 | Status: SHIPPED | OUTPATIENT
Start: 2020-05-05 | End: 2020-08-10 | Stop reason: SDUPTHER

## 2020-05-10 ENCOUNTER — RESULTS ENCOUNTER (OUTPATIENT)
Dept: CARDIOLOGY | Facility: CLINIC | Age: 75
End: 2020-05-10

## 2020-05-10 DIAGNOSIS — E78.2 MIXED HYPERLIPIDEMIA: ICD-10-CM

## 2020-05-10 DIAGNOSIS — E11.9 TYPE 2 DIABETES MELLITUS WITHOUT COMPLICATION, WITHOUT LONG-TERM CURRENT USE OF INSULIN (HCC): ICD-10-CM

## 2020-05-10 DIAGNOSIS — R53.82 CHRONIC FATIGUE: ICD-10-CM

## 2020-05-10 DIAGNOSIS — E55.9 VITAMIN D DEFICIENCY: ICD-10-CM

## 2020-05-11 ENCOUNTER — OFFICE VISIT (OUTPATIENT)
Dept: CARDIOLOGY | Facility: CLINIC | Age: 75
End: 2020-05-11

## 2020-05-11 VITALS
BODY MASS INDEX: 33.46 KG/M2 | HEART RATE: 54 BPM | WEIGHT: 239 LBS | HEIGHT: 71 IN | TEMPERATURE: 98.9 F | DIASTOLIC BLOOD PRESSURE: 72 MMHG | SYSTOLIC BLOOD PRESSURE: 128 MMHG | OXYGEN SATURATION: 97 %

## 2020-05-11 DIAGNOSIS — C61 PROSTATE CA (HCC): ICD-10-CM

## 2020-05-11 DIAGNOSIS — E78.2 MIXED HYPERLIPIDEMIA: Primary | ICD-10-CM

## 2020-05-11 DIAGNOSIS — E11.9 TYPE 2 DIABETES MELLITUS WITHOUT COMPLICATION, WITHOUT LONG-TERM CURRENT USE OF INSULIN (HCC): ICD-10-CM

## 2020-05-11 DIAGNOSIS — E66.09 CLASS 1 OBESITY DUE TO EXCESS CALORIES WITHOUT SERIOUS COMORBIDITY WITH BODY MASS INDEX (BMI) OF 32.0 TO 32.9 IN ADULT: ICD-10-CM

## 2020-05-11 DIAGNOSIS — I25.10 CORONARY ARTERY DISEASE INVOLVING NATIVE CORONARY ARTERY OF NATIVE HEART WITHOUT ANGINA PECTORIS: ICD-10-CM

## 2020-05-11 DIAGNOSIS — I10 ESSENTIAL HYPERTENSION: ICD-10-CM

## 2020-05-11 PROCEDURE — 99214 OFFICE O/P EST MOD 30 MIN: CPT | Performed by: INTERNAL MEDICINE

## 2020-05-11 NOTE — PROGRESS NOTES
subjective     Chief Complaint   Patient presents with   • Results     labs, pt states he is feeling well   • Hyperlipidemia   • Med Refill   • Coronary Artery Disease     History of Present Illness    Patient is 74 years old white male who is here for follow-up.  Patient has known coronary artery disease with prior history of myocardial infarction and PCI to the RCA.  He has been doing very well denies any chest pain palpitations or shortness of breath.  He also has hyperlipidemia and diabetes mellitus.diabetes is very well controlled with low-dose Amaryl.  Lipids are controlled with Crestor.  Patient had lab work done this week which will be reviewed and discussed.  Patient has been taking Prozac and feels that he does not need it now and wants to see if he could taper it off.  Patient has memory loss and tolerating Aricept and Namenda very well.  He also has history of prostate CA he recently saw his urologist and PSA level has been less than 0.1.  Past Surgical History:   Procedure Laterality Date   • CARDIAC CATHETERIZATION  08/2010   • CARDIOVASCULAR STRESS TEST  11/2014   • CARDIOVASCULAR STRESS TEST  11/2014   • COLONOSCOPY  09/01/2014   • COLONOSCOPY  09/2014   • CORONARY STENT PLACEMENT  2005   • CORONARY STENT PLACEMENT  2010   • ECHO - CONVERTED  08/2011   • OTHER SURGICAL HISTORY      Cath Stent Placement 2005 and 2010.   • PROSTATECTOMY      Radical     Family History   Problem Relation Age of Onset   • Coronary artery disease Other    • Cancer Other         Colon   • Alzheimer's disease Mother    • Cancer Sister    • No Known Problems Brother    • Heart disease Sister      Past Medical History:   Diagnosis Date   • Anxiety    • CAD (coronary artery disease)    • Caffeine use     2 Cups Daily.    • Depression    • Diabetes mellitus (CMS/HCC)    • Hyperlipidemia    • Hypertension    • Myocardial infarction (CMS/HCC)    • Obesity    • Prostate cancer (CMS/HCC)      Patient Active Problem List   Diagnosis  "  • CAD (coronary artery disease) status post myocardial infarction and PCI to the RCA in    • Diabetes mellitus (CMS/[facility])   • Hyperlipidemia   • Depression   • Essential hypertension   • Obesity   • Prostate CA (CMS/HCC)   • Memory loss   • Environmental allergies   • Vitamin D deficiency   • Shortness of breath       Social History     Tobacco Use   • Smoking status: Former Smoker     Last attempt to quit: 1991     Years since quittin.0   • Smokeless tobacco: Never Used   Substance Use Topics   • Alcohol use: No   • Drug use: No       Allergies   Allergen Reactions   • Penicillins Other (See Comments)     Yrs ago went \"out of it\"     • Sulfa Antibiotics Hives       Current Outpatient Medications on File Prior to Visit   Medication Sig   • albuterol sulfate  (90 Base) MCG/ACT inhaler Inhale 2 puffs Every 4 (Four) Hours As Needed for Wheezing.   • aspirin 325 MG tablet Take 325 mg by mouth daily.   • cholecalciferol (VITAMIN D3) 1000 UNITS tablet Take 1,000 Units by mouth daily.   • donepezil (ARICEPT) 10 MG tablet TAKE ONE TABLET BY MOUTH EVERY NIGHT   • famotidine (PEPCID) 40 MG tablet Take 1 tablet by mouth Daily.   • FLUoxetine (PROzac) 20 MG capsule Take 1 capsule by mouth Daily.   • glimepiride (AMARYL) 1 MG tablet Take 1/2 tablet daily   • Lancets misc    • lisinopril (PRINIVIL,ZESTRIL) 10 MG tablet Take 1 tablet by mouth 2 (Two) Times a Day.   • memantine (NAMENDA) 10 MG tablet TAKE ONE TABLET BY MOUTH TWICE A DAY   • montelukast (SINGULAIR) 10 MG tablet TAKE ONE TABLET BY MOUTH EVERY NIGHT   • rosuvastatin (CRESTOR) 20 MG tablet TAKE ONE TABLET BY MOUTH DAILY   • tiotropium (SPIRIVA) 18 MCG per inhalation capsule Place 1 capsule into inhaler and inhale Daily.   • vitamin B-12 (CYANOCOBALAMIN) 1000 MCG tablet Take 1,000 mcg by mouth daily.   • [DISCONTINUED] glucose blood test strip 1 each by Other route As Needed (prn). Use as instructed   • [DISCONTINUED] SITagliptin (JANUVIA) 50 MG " "tablet Take 1 tablet by mouth Daily.     No current facility-administered medications on file prior to visit.          The following portions of the patient's history were reviewed and updated as appropriate: allergies, current medications, past family history, past medical history, past social history, past surgical history and problem list.    Review of Systems   Constitution: Negative.   HENT: Negative.  Negative for congestion.    Eyes: Negative.    Cardiovascular: Negative.  Negative for chest pain, cyanosis, dyspnea on exertion, irregular heartbeat, leg swelling, near-syncope, orthopnea, palpitations, paroxysmal nocturnal dyspnea and syncope.   Respiratory: Negative.  Negative for shortness of breath.    Hematologic/Lymphatic: Negative.    Musculoskeletal: Negative.    Gastrointestinal: Negative.    Neurological: Negative.  Negative for headaches.          Objective:     /72 (BP Location: Left arm, Patient Position: Sitting, Cuff Size: Adult)   Pulse 54   Temp 98.9 °F (37.2 °C)   Ht 180.3 cm (71\")   Wt 108 kg (239 lb)   SpO2 97%   BMI 33.33 kg/m²   Physical Exam   Constitutional: He appears well-developed and well-nourished. No distress.   HENT:   Head: Normocephalic and atraumatic.   Mouth/Throat: Oropharynx is clear and moist. No oropharyngeal exudate.   Eyes: Pupils are equal, round, and reactive to light. Conjunctivae and EOM are normal. No scleral icterus.   Neck: Normal range of motion. Neck supple. No JVD present. No tracheal deviation present. No thyromegaly present.   Cardiovascular: Normal rate, regular rhythm, normal heart sounds and intact distal pulses. PMI is not displaced. Exam reveals no gallop, no friction rub and no decreased pulses.   No murmur heard.  Pulses:       Carotid pulses are 3+ on the right side, and 3+ on the left side.       Radial pulses are 3+ on the right side, and 3+ on the left side.   Pulmonary/Chest: Effort normal and breath sounds normal. No respiratory " distress. He has no wheezes. He has no rales. He exhibits no tenderness.   Abdominal: Soft. Bowel sounds are normal. He exhibits no distension, no abdominal bruit and no mass. There is no splenomegaly or hepatomegaly. There is no tenderness. There is no rebound and no guarding.   Musculoskeletal: Normal range of motion. He exhibits no edema, tenderness or deformity.   Lymphadenopathy:     He has no cervical adenopathy.   Neurological: He is alert. He has normal reflexes. No cranial nerve deficit. He exhibits normal muscle tone. Coordination normal.   Skin: Skin is warm and dry. No rash noted. He is not diaphoretic. No erythema.   Psychiatric: He has a normal mood and affect. His behavior is normal. Judgment and thought content normal.         Lab Review        Procedures       I personally viewed and interpreted the patient's LAB data         Assessment:     1. Mixed hyperlipidemia    2. Essential hypertension    3. Coronary artery disease involving native coronary artery of native heart without angina pectoris    4. Prostate CA (CMS/[facility])    5. Class 1 obesity due to excess calories without serious comorbidity with body mass index (BMI) of 32.0 to 32.9 in adult    6. Type 2 diabetes mellitus without complication, without long-term current use of insulin (CMS/[facility])          Plan:     Hyperlipidemia is very well controlled  Her cholesterol is 122 and LDL 69.  Patient will continue Crestor and Zetia  Hemoglobin A1c is 5.8 and blood sugar 116, Amaryl was continued he is only taking Amaryl 0.5 mg daily.  He could not take Januvia because it was expensive  Blood pressure is controlled lisinopril was continued.  Namenda and Aricept was continued.  Prozac was tapered off.  Patient will take 1 every other day for 2 weeks and then 1 every third day and then discontinued.  Singulair and Pepcid were also continued.  Follow-up scheduled.  COVID-19 precautions discussed.      No follow-ups on file.

## 2020-05-27 RX ORDER — ROSUVASTATIN CALCIUM 20 MG/1
TABLET, COATED ORAL
Qty: 90 TABLET | Refills: 0 | Status: SHIPPED | OUTPATIENT
Start: 2020-05-27 | End: 2020-08-10 | Stop reason: SDUPTHER

## 2020-06-02 RX ORDER — DONEPEZIL HYDROCHLORIDE 10 MG/1
TABLET, FILM COATED ORAL
Qty: 90 TABLET | Refills: 0 | Status: SHIPPED | OUTPATIENT
Start: 2020-06-02 | End: 2020-08-31

## 2020-06-30 RX ORDER — MEMANTINE HYDROCHLORIDE 10 MG/1
TABLET ORAL
Qty: 180 TABLET | Refills: 0 | Status: SHIPPED | OUTPATIENT
Start: 2020-06-30 | End: 2020-09-29

## 2020-08-10 ENCOUNTER — OFFICE VISIT (OUTPATIENT)
Dept: CARDIOLOGY | Facility: CLINIC | Age: 75
End: 2020-08-10

## 2020-08-10 VITALS
HEIGHT: 71 IN | OXYGEN SATURATION: 99 % | WEIGHT: 242 LBS | SYSTOLIC BLOOD PRESSURE: 156 MMHG | TEMPERATURE: 98.7 F | BODY MASS INDEX: 33.88 KG/M2 | DIASTOLIC BLOOD PRESSURE: 74 MMHG | HEART RATE: 50 BPM

## 2020-08-10 DIAGNOSIS — I25.10 CORONARY ARTERY DISEASE INVOLVING NATIVE CORONARY ARTERY OF NATIVE HEART WITHOUT ANGINA PECTORIS: Primary | ICD-10-CM

## 2020-08-10 DIAGNOSIS — I10 ESSENTIAL HYPERTENSION: ICD-10-CM

## 2020-08-10 DIAGNOSIS — E66.09 CLASS 1 OBESITY DUE TO EXCESS CALORIES WITHOUT SERIOUS COMORBIDITY WITH BODY MASS INDEX (BMI) OF 33.0 TO 33.9 IN ADULT: ICD-10-CM

## 2020-08-10 DIAGNOSIS — Z91.09 ENVIRONMENTAL ALLERGIES: ICD-10-CM

## 2020-08-10 DIAGNOSIS — E78.2 MIXED HYPERLIPIDEMIA: ICD-10-CM

## 2020-08-10 DIAGNOSIS — E11.9 TYPE 2 DIABETES MELLITUS WITHOUT COMPLICATION, WITHOUT LONG-TERM CURRENT USE OF INSULIN (HCC): ICD-10-CM

## 2020-08-10 DIAGNOSIS — R41.3 MEMORY LOSS: ICD-10-CM

## 2020-08-10 DIAGNOSIS — R06.09 DOE (DYSPNEA ON EXERTION): ICD-10-CM

## 2020-08-10 PROCEDURE — 99214 OFFICE O/P EST MOD 30 MIN: CPT | Performed by: INTERNAL MEDICINE

## 2020-08-10 RX ORDER — ROSUVASTATIN CALCIUM 20 MG/1
20 TABLET, COATED ORAL DAILY
Qty: 90 TABLET | Refills: 0 | Status: SHIPPED | OUTPATIENT
Start: 2020-08-10 | End: 2020-11-23

## 2020-08-10 RX ORDER — MONTELUKAST SODIUM 10 MG/1
10 TABLET ORAL
Qty: 90 TABLET | Refills: 0 | Status: SHIPPED | OUTPATIENT
Start: 2020-08-10 | End: 2020-11-09 | Stop reason: SDUPTHER

## 2020-08-10 RX ORDER — GLIMEPIRIDE 1 MG/1
TABLET ORAL
Qty: 45 TABLET | Refills: 2 | Status: SHIPPED | OUTPATIENT
Start: 2020-08-10 | End: 2020-11-09 | Stop reason: SDUPTHER

## 2020-08-10 NOTE — PROGRESS NOTES
subjective     Chief Complaint   Patient presents with   • Hypertension     Follow up   • Shortness of Breath     Follow up   • Cough     coughing up white phlegm concerns him with CHF   • Diabetes     Follow up     History of Present Illness  Patient is 75 years old white male who is here for follow-up.  Patient states that he has been coughing with clear phlegm.  He is concerned about heart failure.  There is no ankle edema no orthopnea PND but he has been coughing with clear phlegm and he thinks that he may have heart failure.  No fever or chills.  No change in taste or smell.  No headaches  His last echocardiogram was in March 2019.    Blood pressure is elevated today however patient thinks that his blood pressure is good at home.  He is taking lisinopril 10 mg twice daily.  He also has hyperlipidemia and has been taking Crestor 20 mg daily.    He also wants to take prescription of omeprazole because he has to buy over-the-counter.  Environmental allergies are better with Singulair.  He has some degree of memory loss and has been taking Namenda and Aricept.  Blood sugar is controlled with Amaryl.  Patient has not lost any weight actually has gained some weight.  Past Surgical History:   Procedure Laterality Date   • CARDIAC CATHETERIZATION  08/2010   • CARDIOVASCULAR STRESS TEST  11/2014   • CARDIOVASCULAR STRESS TEST  11/2014   • COLONOSCOPY  09/01/2014   • COLONOSCOPY  09/2014   • CORONARY STENT PLACEMENT  2005   • CORONARY STENT PLACEMENT  2010   • ECHO - CONVERTED  08/2011   • OTHER SURGICAL HISTORY      Cath Stent Placement 2005 and 2010.   • PROSTATECTOMY      Radical     Family History   Problem Relation Age of Onset   • Coronary artery disease Other    • Cancer Other         Colon   • Alzheimer's disease Mother    • Cancer Sister    • No Known Problems Brother    • Heart disease Sister      Past Medical History:   Diagnosis Date   • Anxiety    • CAD (coronary artery disease)    • Caffeine use     2 Cups  "Daily.    • Depression    • Diabetes mellitus (CMS/[facility])    • Hyperlipidemia    • Hypertension    • Myocardial infarction (CMS/[facility])    • Obesity    • Prostate cancer (CMS/[facility])      Patient Active Problem List   Diagnosis   • CAD (coronary artery disease) status post myocardial infarction and PCI to the RCA in    • Diabetes mellitus (CMS/[facility])   • Hyperlipidemia   • Depression   • Essential hypertension   • Obesity   • Prostate CA (CMS/[facility])   • Memory loss   • Environmental allergies   • Vitamin D deficiency   • Shortness of breath       Social History     Tobacco Use   • Smoking status: Former Smoker     Last attempt to quit: 1991     Years since quittin.2   • Smokeless tobacco: Never Used   Substance Use Topics   • Alcohol use: No   • Drug use: No       Allergies   Allergen Reactions   • Penicillins Other (See Comments)     Yrs ago went \"out of it\"     • Sulfa Antibiotics Hives       Current Outpatient Medications on File Prior to Visit   Medication Sig   • albuterol sulfate  (90 Base) MCG/ACT inhaler Inhale 2 puffs Every 4 (Four) Hours As Needed for Wheezing.   • aspirin 325 MG tablet Take 325 mg by mouth daily.   • cholecalciferol (VITAMIN D3) 1000 UNITS tablet Take 1,000 Units by mouth daily.   • donepezil (ARICEPT) 10 MG tablet TAKE ONE TABLET BY MOUTH EVERY NIGHT   • glucose blood test strip 1 each by Other route As Needed (prn). Use as instructed   • Lancets misc    • lisinopril (PRINIVIL,ZESTRIL) 10 MG tablet Take 1 tablet by mouth 2 (Two) Times a Day.   • memantine (NAMENDA) 10 MG tablet TAKE ONE TABLET BY MOUTH TWICE A DAY   • tiotropium (SPIRIVA) 18 MCG per inhalation capsule Place 1 capsule into inhaler and inhale Daily.   • vitamin B-12 (CYANOCOBALAMIN) 1000 MCG tablet Take 1,000 mcg by mouth daily.   • [DISCONTINUED] famotidine (PEPCID) 40 MG tablet Take 1 tablet by mouth Daily.   • [DISCONTINUED] glimepiride (AMARYL) 1 MG tablet Take 1/2 tablet daily   • [DISCONTINUED] montelukast " "(SINGULAIR) 10 MG tablet TAKE ONE TABLET BY MOUTH EVERY NIGHT   • [DISCONTINUED] rosuvastatin (CRESTOR) 20 MG tablet TAKE ONE TABLET BY MOUTH DAILY   • [DISCONTINUED] FLUoxetine (PROzac) 20 MG capsule Take 1 capsule by mouth Daily.     No current facility-administered medications on file prior to visit.          The following portions of the patient's history were reviewed and updated as appropriate: allergies, current medications, past family history, past medical history, past social history, past surgical history and problem list.    Review of Systems   Constitution: Negative.   HENT: Negative.  Negative for congestion.    Eyes: Negative.    Cardiovascular: Negative.  Negative for chest pain, cyanosis, dyspnea on exertion, irregular heartbeat, leg swelling, near-syncope, orthopnea, palpitations, paroxysmal nocturnal dyspnea and syncope.   Respiratory: Positive for cough. Negative for shortness of breath.    Hematologic/Lymphatic: Negative.    Musculoskeletal: Negative.    Gastrointestinal: Negative.    Neurological: Negative.  Negative for headaches.          Objective:     /74 (BP Location: Left arm, Patient Position: Sitting, Cuff Size: Adult)   Pulse 50   Temp 98.7 °F (37.1 °C)   Ht 180.3 cm (71\")   Wt 110 kg (242 lb)   SpO2 99%   BMI 33.75 kg/m²   Physical Exam   Constitutional: He appears well-developed and well-nourished. No distress.   HENT:   Head: Normocephalic and atraumatic.   Mouth/Throat: Oropharynx is clear and moist. No oropharyngeal exudate.   Eyes: Pupils are equal, round, and reactive to light. Conjunctivae and EOM are normal. No scleral icterus.   Neck: Normal range of motion. Neck supple. No JVD present. No tracheal deviation present. No thyromegaly present.   Cardiovascular: Normal rate, regular rhythm, normal heart sounds and intact distal pulses. PMI is not displaced. Exam reveals no gallop, no friction rub and no decreased pulses.   No murmur heard.  Pulses:       Carotid " pulses are 3+ on the right side, and 3+ on the left side.       Radial pulses are 3+ on the right side, and 3+ on the left side.   Pulmonary/Chest: Effort normal and breath sounds normal. No respiratory distress. He has no wheezes. He has no rales. He exhibits no tenderness.   Abdominal: Soft. Bowel sounds are normal. He exhibits no distension, no abdominal bruit and no mass. There is no splenomegaly or hepatomegaly. There is no tenderness. There is no rebound and no guarding.   Musculoskeletal: Normal range of motion. He exhibits no edema, tenderness or deformity.   Lymphadenopathy:     He has no cervical adenopathy.   Neurological: He is alert. He has normal reflexes. No cranial nerve deficit. He exhibits normal muscle tone. Coordination normal.   Skin: Skin is warm and dry. No rash noted. He is not diaphoretic. No erythema.   Psychiatric: He has a normal mood and affect. His behavior is normal. Judgment and thought content normal.         Lab Review  Labs orderd  Procedures       I personally viewed and interpreted the patient's LAB data         Assessment:     1. Coronary artery disease involving native coronary artery of native heart without angina pectoris    2. Mixed hyperlipidemia    3. Essential hypertension    4. Type 2 diabetes mellitus without complication, without long-term current use of insulin (CMS/[facility])    5. Memory loss    6. Environmental allergies    7. Class 1 obesity due to excess calories without serious comorbidity with body mass index (BMI) of 33.0 to 33.9 in adult          Plan:     Patient complains of cough and he has appointment with pulmonologist.  Patient states that they will do chest x-ray and PFTs on me all the time  He is primarily concerned about heart causing the cough.  It was explained to him the cough could be coming from lisinopril.  Will arrange echocardiogram and proBNP to check for heart failure.    Patient also has hyperlipidemia and has been taking statin therapy will  check lab work.  Diabetes apparently is very well controlled at home we will check hemoglobin A1c also.  Healthy lifestyle and weight loss emphasized.  Follow-up with lab work scheduled        No follow-ups on file.

## 2020-08-18 ENCOUNTER — HOSPITAL ENCOUNTER (OUTPATIENT)
Dept: CARDIOLOGY | Facility: HOSPITAL | Age: 75
Discharge: HOME | End: 2020-08-18
Admitting: INTERNAL MEDICINE

## 2020-08-18 DIAGNOSIS — R06.09 DOE (DYSPNEA ON EXERTION): ICD-10-CM

## 2020-08-18 LAB
BH CV ECHO MEAS - % IVS THICK: 4.8 %
BH CV ECHO MEAS - % LVPW THICK: 17.7 %
BH CV ECHO MEAS - ACS: 2.6 CM
BH CV ECHO MEAS - AO MAX PG: 8.4 MMHG
BH CV ECHO MEAS - AO MEAN PG: 4 MMHG
BH CV ECHO MEAS - AO ROOT AREA (BSA CORRECTED): 1.7
BH CV ECHO MEAS - AO ROOT AREA: 11.3 CM^2
BH CV ECHO MEAS - AO ROOT DIAM: 3.8 CM
BH CV ECHO MEAS - AO V2 MAX: 145 CM/SEC
BH CV ECHO MEAS - AO V2 MEAN: 98.9 CM/SEC
BH CV ECHO MEAS - AO V2 VTI: 35.8 CM
BH CV ECHO MEAS - BSA(HAYCOCK): 2.4 M^2
BH CV ECHO MEAS - BSA: 2.3 M^2
BH CV ECHO MEAS - BZI_BMI: 33.8 KILOGRAMS/M^2
BH CV ECHO MEAS - BZI_METRIC_HEIGHT: 180.3 CM
BH CV ECHO MEAS - BZI_METRIC_WEIGHT: 109.8 KG
BH CV ECHO MEAS - EDV(CUBED): 104.8 ML
BH CV ECHO MEAS - EDV(MOD-SP4): 107 ML
BH CV ECHO MEAS - EDV(TEICH): 103.1 ML
BH CV ECHO MEAS - EF(CUBED): 68 %
BH CV ECHO MEAS - EF(MOD-SP4): 63 %
BH CV ECHO MEAS - EF(TEICH): 59.5 %
BH CV ECHO MEAS - ESV(CUBED): 33.5 ML
BH CV ECHO MEAS - ESV(MOD-SP4): 39.6 ML
BH CV ECHO MEAS - ESV(TEICH): 41.7 ML
BH CV ECHO MEAS - FS: 31.6 %
BH CV ECHO MEAS - IVS/LVPW: 1
BH CV ECHO MEAS - IVSD: 1.3 CM
BH CV ECHO MEAS - IVSS: 1.4 CM
BH CV ECHO MEAS - LA DIMENSION: 4.1 CM
BH CV ECHO MEAS - LA/AO: 1.1
BH CV ECHO MEAS - LV DIASTOLIC VOL/BSA (35-75): 46.8 ML/M^2
BH CV ECHO MEAS - LV MASS(C)D: 239.9 GRAMS
BH CV ECHO MEAS - LV MASS(C)DI: 104.9 GRAMS/M^2
BH CV ECHO MEAS - LV MASS(C)S: 163.9 GRAMS
BH CV ECHO MEAS - LV MASS(C)SI: 71.7 GRAMS/M^2
BH CV ECHO MEAS - LV SYSTOLIC VOL/BSA (12-30): 17.3 ML/M^2
BH CV ECHO MEAS - LVIDD: 4.7 CM
BH CV ECHO MEAS - LVIDS: 3.2 CM
BH CV ECHO MEAS - LVLD AP4: 7.2 CM
BH CV ECHO MEAS - LVLS AP4: 6.3 CM
BH CV ECHO MEAS - LVOT AREA (M): 4.2 CM^2
BH CV ECHO MEAS - LVOT AREA: 4.2 CM^2
BH CV ECHO MEAS - LVOT DIAM: 2.3 CM
BH CV ECHO MEAS - LVPWD: 1.3 CM
BH CV ECHO MEAS - LVPWS: 1.5 CM
BH CV ECHO MEAS - MV A MAX VEL: 53.6 CM/SEC
BH CV ECHO MEAS - MV E MAX VEL: 60.8 CM/SEC
BH CV ECHO MEAS - MV E/A: 1.1
BH CV ECHO MEAS - PA ACC TIME: 0.08 SEC
BH CV ECHO MEAS - PA PR(ACCEL): 44.4 MMHG
BH CV ECHO MEAS - SI(AO): 177.5 ML/M^2
BH CV ECHO MEAS - SI(CUBED): 31.2 ML/M^2
BH CV ECHO MEAS - SI(MOD-SP4): 29.5 ML/M^2
BH CV ECHO MEAS - SI(TEICH): 26.8 ML/M^2
BH CV ECHO MEAS - SV(AO): 406 ML
BH CV ECHO MEAS - SV(CUBED): 71.3 ML
BH CV ECHO MEAS - SV(MOD-SP4): 67.4 ML
BH CV ECHO MEAS - SV(TEICH): 61.4 ML
MAXIMAL PREDICTED HEART RATE: 147 BPM
STRESS TARGET HR: 125 BPM

## 2020-08-18 PROCEDURE — 93306 TTE W/DOPPLER COMPLETE: CPT | Performed by: INTERNAL MEDICINE

## 2020-08-18 PROCEDURE — 93306 TTE W/DOPPLER COMPLETE: CPT

## 2020-08-19 ENCOUNTER — OFFICE VISIT (OUTPATIENT)
Dept: PULMONOLOGY | Facility: CLINIC | Age: 75
End: 2020-08-19

## 2020-08-19 ENCOUNTER — TELEPHONE (OUTPATIENT)
Dept: CARDIOLOGY | Facility: CLINIC | Age: 75
End: 2020-08-19

## 2020-08-19 VITALS
TEMPERATURE: 98.2 F | OXYGEN SATURATION: 98 % | HEIGHT: 71 IN | DIASTOLIC BLOOD PRESSURE: 62 MMHG | SYSTOLIC BLOOD PRESSURE: 132 MMHG | WEIGHT: 242 LBS | BODY MASS INDEX: 33.88 KG/M2 | HEART RATE: 57 BPM

## 2020-08-19 DIAGNOSIS — R13.10 DYSPHAGIA, UNSPECIFIED TYPE: ICD-10-CM

## 2020-08-19 DIAGNOSIS — J41.0 SIMPLE CHRONIC BRONCHITIS (HCC): Primary | ICD-10-CM

## 2020-08-19 PROCEDURE — 99214 OFFICE O/P EST MOD 30 MIN: CPT | Performed by: NURSE PRACTITIONER

## 2020-08-19 NOTE — PROGRESS NOTES
Have you had the Influenza Vaccine? yes    Would you like to receive this Vaccine today? no    Have you had the Pneumonia Vaccine?  yes   Would you like to receive this Vaccine today? no    Are you a current smoker? no  Quit date? 35 years ago    Janae Singer presents for the following Shortness of Breath      History of Present Illness     Mr. Singer is a 75 year old male with a medical history significant for CAD, diabetes, hyperlipidemia, hypertension, MI and prostate cancer.    He presents today for a follow up on shortness of breath.  He reports that his shortness of breath seems to have improved since his last visit.  He does complain of coughing and increased sputum production with meals and at night time.   He feels the need to clear his throat every time he eats.  He is currently taking Spirivia once daily.  He also had a chest xray completed since his last visit.  He is a former smoker, quitting 35 years ago.    Review of Systems   Constitutional: Negative for activity change, fatigue and unexpected weight change.   HENT: Negative for congestion, postnasal drip and rhinorrhea.    Respiratory: Positive for shortness of breath and wheezing. Negative for apnea, cough and chest tightness.    Cardiovascular: Negative for chest pain and palpitations.   Gastrointestinal: Negative for nausea.   Allergic/Immunologic: Negative for environmental allergies.   Psychiatric/Behavioral: Negative for agitation and confusion.       Active Problems:  Problem List Items Addressed This Visit     None      Visit Diagnoses     Simple chronic bronchitis (CMS/HCC)    -  Primary    Relevant Orders    CT Chest Without Contrast    Dysphagia, unspecified type              Past Medical History:  Past Medical History:   Diagnosis Date   • Anxiety    • CAD (coronary artery disease)    • Caffeine use     2 Cups Daily.    • Depression    • Diabetes mellitus (CMS/HCC)    • Hyperlipidemia    • Hypertension    • Myocardial  infarction (CMS/HCC)    • Obesity    • Prostate cancer (CMS/HCC)        Family History:  Family History   Problem Relation Age of Onset   • Coronary artery disease Other    • Cancer Other         Colon   • Alzheimer's disease Mother    • Cancer Sister    • No Known Problems Brother    • Heart disease Sister        Social History:  Social History     Tobacco Use   • Smoking status: Former Smoker     Last attempt to quit: 1991     Years since quittin.2   • Smokeless tobacco: Never Used   Substance Use Topics   • Alcohol use: No       Current Medications:  Current Outpatient Medications   Medication Sig Dispense Refill   • albuterol sulfate  (90 Base) MCG/ACT inhaler Inhale 2 puffs Every 4 (Four) Hours As Needed for Wheezing. 6.7 g 11   • aspirin 325 MG tablet Take 325 mg by mouth daily.     • cholecalciferol (VITAMIN D3) 1000 UNITS tablet Take 1,000 Units by mouth daily.     • donepezil (ARICEPT) 10 MG tablet TAKE ONE TABLET BY MOUTH EVERY NIGHT 90 tablet 0   • glimepiride (Amaryl) 1 MG tablet Take 1/2 tablet daily 45 tablet 2   • glucose blood test strip 1 each by Other route As Needed (prn). Use as instructed 100 each 1   • Lancets misc      • lisinopril (PRINIVIL,ZESTRIL) 10 MG tablet Take 1 tablet by mouth 2 (Two) Times a Day. 180 tablet 1   • memantine (NAMENDA) 10 MG tablet TAKE ONE TABLET BY MOUTH TWICE A  tablet 0   • montelukast (SINGULAIR) 10 MG tablet Take 1 tablet by mouth every night at bedtime. 90 tablet 0   • Omeprazole Magnesium 10 MG pack Take 20 mg by mouth At Night As Needed (as needed for heartburn). 60 each 2   • rosuvastatin (CRESTOR) 20 MG tablet Take 1 tablet by mouth Daily. 90 tablet 0   • tiotropium (SPIRIVA) 18 MCG per inhalation capsule Place 1 capsule into inhaler and inhale Daily. 30 capsule 11   • vitamin B-12 (CYANOCOBALAMIN) 1000 MCG tablet Take 1,000 mcg by mouth daily.       No current facility-administered medications for this visit.   "      Allergies:  Allergies   Allergen Reactions   • Penicillins Other (See Comments)     Yrs ago went \"out of it\"     • Sulfa Antibiotics Hives       Vitals:  /62   Pulse 57   Temp 98.2 °F (36.8 °C) (Temporal)   Ht 180.3 cm (71\")   Wt 110 kg (242 lb)   SpO2 98%   BMI 33.75 kg/m²     Imaging:    Imaging Results (Most Recent)     None          Pulmonary Functions Testing Results:    No results found for: FEV1, FVC, [id number], TLC, DLCO    Results for orders placed or performed during the hospital encounter of 08/18/20   Adult Transthoracic Echo Complete W/ Cont if Necessary Per Protocol   Result Value Ref Range    BSA 2.3 m^2    IVSd 1.3 cm    IVSs 1.4 cm    LVIDd 4.7 cm    LVIDs 3.2 cm    LVPWd 1.3 cm    BH CV ECHO GREGORY - LVPWS 1.5 cm    IVS/LVPW 1.0     FS 31.6 %    EDV(Teich) 103.1 ml    ESV(Teich) 41.7 ml    EF(Teich) 59.5 %    EDV(cubed) 104.8 ml    ESV(cubed) 33.5 ml    EF(cubed) 68.0 %    % IVS thick 4.8 %    % LVPW thick 17.7 %    LV mass(C)d 239.9 grams    LV mass(C)dI 104.9 grams/m^2    LV mass(C)s 163.9 grams    LV mass(C)sI 71.7 grams/m^2    SV(Teich) 61.4 ml    SI(Teich) 26.8 ml/m^2    SV(cubed) 71.3 ml    SI(cubed) 31.2 ml/m^2    Ao root diam 3.8 cm    Ao root area 11.3 cm^2    ACS 2.6 cm    LA dimension 4.1 cm    LA/Ao 1.1     LVOT diam 2.3 cm    LVOT area 4.2 cm^2    LVOT area(traced) 4.2 cm^2    LVLd ap4 7.2 cm    EDV(MOD-sp4) 107.0 ml    LVLs ap4 6.3 cm    ESV(MOD-sp4) 39.6 ml    EF(MOD-sp4) 63.0 %    SV(MOD-sp4) 67.4 ml    SI(MOD-sp4) 29.5 ml/m^2    Ao root area (BSA corrected) 1.7     LV Salazar Vol (BSA corrected) 46.8 ml/m^2    LV Sys Vol (BSA corrected) 17.3 ml/m^2    MV E max jordan 60.8 cm/sec    MV A max jordan 53.6 cm/sec    MV E/A 1.1     Ao pk jordan 145.0 cm/sec    Ao max PG 8.4 mmHg    Ao V2 mean 98.9 cm/sec    Ao mean PG 4.0 mmHg    Ao V2 VTI 35.8 cm    SV(Ao) 406.0 ml    SI(Ao) 177.5 ml/m^2    PA acc time 0.08 sec    PA pr(Accel) 44.4 mmHg    BH CV ECHO GREGORY - BZI_BMI 33.8 kilograms/m^2 "     CV ECHO GREGORY - BSA([facility]) 2.4 m^2     CV ECHO GREGORY - BZI_METRIC_WEIGHT 109.8 kg     CV ECHO GREGORY - BZI_METRIC_HEIGHT 180.3 cm    Target HR (85%) 125 bpm    Max. Pred. HR (100%) 147 bpm       Objective   Physical Exam     GENERAL APPEARANCE: Well developed, well nourished, alert and cooperative, and appears to be in no acute distress.    HEAD: normocephalic. Atraumatic.    EYES: PERRL, EOMI. Vision is grossly intact.    THROAT: Oral cavity and pharynx normal. No inflammation, swelling, exudate, or lesions.     NECK: Neck supple.  No thyromegaly.    CARDIAC: Normal S1 and S2. No S3, S4 or murmurs. Rhythm is regular.     RESPIRATORY:Bilateral air entry positive. Bilateral diminished breath sounds. No wheezing, crackles or rhonchi noted.    GI: Positive bowel sounds. Soft, nondistended, nontender.     MUSCULOSKELETAL: No significant deformity or joint abnormality. No edema. Peripheral pulses intact. No varicosities.    NEUROLOGICAL: Strength and sensation symmetric and intact throughout.     PSYCHIATRIC: The mental examination revealed the patient was oriented to person, place, and time.       Assessment/Plan     Simple Chronic Bronchitis:  -PFT was reviewed: PFT shows a moderate obstruction with no significant bronchodilator response.  Air trapping is noted. DLCO is noted to be mildly reduced.    -Continue Spiriva once daily.  -Chest xray reviewed:  Bilateral granulomas noted.  Will order chest CT for further review.    -echo was reviewed:    Results for orders placed during the hospital encounter of 08/18/20   Adult Transthoracic Echo Complete W/ Cont if Necessary Per Protocol    Narrative · Normal left ventricular cavity size with mild concentric hypertrophy.  · Left ventricular systolic function is normal. Estimated EF appears to be   in the range of 56 - 60%.  · Left ventricular diastolic dysfunction is noted (grade I) consistent   with impaired relaxation.  · No significant valvular heart disease  ·  There is no evidence of pericardial effusion.          Dysphagia:  -The patient complains of increased sputum production when he is eating.  He complains of having to clear his throat several times when eating.  Will order a swallow evaluation to further evaluate swallowing.          ICD-10-CM ICD-9-CM   1. Simple chronic bronchitis (CMS/[facility]) J41.0 491.0   2. Dysphagia, unspecified type R13.10 787.20       Return in about 3 months (around 11/19/2020).

## 2020-08-19 NOTE — TELEPHONE ENCOUNTER
Called and given message per Dr. Rodney that Echo is ok.  He thanked me for calling with results.

## 2020-08-26 ENCOUNTER — TELEPHONE (OUTPATIENT)
Dept: PULMONOLOGY | Facility: CLINIC | Age: 75
End: 2020-08-26

## 2020-08-26 NOTE — TELEPHONE ENCOUNTER
----- Message from ITALO Roy sent at 8/22/2020  8:06 PM EDT -----  Will you call and tell him that I talked to Dr. Bass and he suggested doing a swallow evaluation first for his problem with more sputum production when he is eating.

## 2020-08-27 ENCOUNTER — TELEPHONE (OUTPATIENT)
Dept: INTERVENTIONAL RADIOLOGY/VASCULAR | Facility: HOSPITAL | Age: 75
End: 2020-08-27

## 2020-08-27 ENCOUNTER — TRANSCRIBE ORDERS (OUTPATIENT)
Dept: ADMINISTRATIVE | Facility: HOSPITAL | Age: 75
End: 2020-08-27

## 2020-08-27 DIAGNOSIS — Z01.818 PREOP EXAMINATION: Primary | ICD-10-CM

## 2020-08-27 NOTE — TELEPHONE ENCOUNTER
Spoke with patient, notified of need for Covid testing prior to procedure. Patient given date and pre-registration information for outpatient screening center. Order faxed to screening center for patient.

## 2020-08-31 ENCOUNTER — HOSPITAL ENCOUNTER (OUTPATIENT)
Dept: CT IMAGING | Facility: HOSPITAL | Age: 75
Discharge: HOME | End: 2020-08-31
Admitting: NURSE PRACTITIONER

## 2020-08-31 ENCOUNTER — TRANSCRIBE ORDERS (OUTPATIENT)
Dept: ADMINISTRATIVE | Facility: HOSPITAL | Age: 75
End: 2020-08-31

## 2020-08-31 DIAGNOSIS — J41.0 SIMPLE CHRONIC BRONCHITIS (HCC): ICD-10-CM

## 2020-08-31 DIAGNOSIS — Z01.818 OTHER SPECIFIED PRE-OPERATIVE EXAMINATION: Primary | ICD-10-CM

## 2020-08-31 PROCEDURE — 71250 CT THORAX DX C-: CPT

## 2020-08-31 PROCEDURE — 71250 CT THORAX DX C-: CPT | Performed by: RADIOLOGY

## 2020-08-31 RX ORDER — DONEPEZIL HYDROCHLORIDE 10 MG/1
TABLET, FILM COATED ORAL
Qty: 90 TABLET | Refills: 0 | Status: SHIPPED | OUTPATIENT
Start: 2020-08-31 | End: 2020-12-01

## 2020-09-04 ENCOUNTER — APPOINTMENT (OUTPATIENT)
Dept: GENERAL RADIOLOGY | Facility: HOSPITAL | Age: 75
End: 2020-09-04

## 2020-09-04 ENCOUNTER — DOCUMENTATION (OUTPATIENT)
Dept: PULMONOLOGY | Facility: CLINIC | Age: 75
End: 2020-09-04

## 2020-09-04 NOTE — PROGRESS NOTES
Patient was updated about CT chest results.  Dr. Penaloza reviewed the CT Chest and recommended repeating the study in 8 months.

## 2020-09-29 RX ORDER — MEMANTINE HYDROCHLORIDE 10 MG/1
TABLET ORAL
Qty: 180 TABLET | Refills: 0 | Status: SHIPPED | OUTPATIENT
Start: 2020-09-29 | End: 2020-12-29

## 2020-10-01 RX ORDER — LISINOPRIL 10 MG/1
10 TABLET ORAL 2 TIMES DAILY
Qty: 180 TABLET | Refills: 1 | Status: SHIPPED | OUTPATIENT
Start: 2020-10-01 | End: 2021-04-01

## 2020-11-09 ENCOUNTER — OFFICE VISIT (OUTPATIENT)
Dept: CARDIOLOGY | Facility: CLINIC | Age: 75
End: 2020-11-09

## 2020-11-09 VITALS
BODY MASS INDEX: 32.9 KG/M2 | WEIGHT: 235 LBS | DIASTOLIC BLOOD PRESSURE: 66 MMHG | HEART RATE: 66 BPM | TEMPERATURE: 97.3 F | HEIGHT: 71 IN | OXYGEN SATURATION: 99 % | SYSTOLIC BLOOD PRESSURE: 122 MMHG

## 2020-11-09 DIAGNOSIS — E66.09 CLASS 1 OBESITY DUE TO EXCESS CALORIES WITHOUT SERIOUS COMORBIDITY WITH BODY MASS INDEX (BMI) OF 33.0 TO 33.9 IN ADULT: ICD-10-CM

## 2020-11-09 DIAGNOSIS — R41.3 MEMORY LOSS: ICD-10-CM

## 2020-11-09 DIAGNOSIS — E78.2 MIXED HYPERLIPIDEMIA: Primary | ICD-10-CM

## 2020-11-09 DIAGNOSIS — I10 ESSENTIAL HYPERTENSION: ICD-10-CM

## 2020-11-09 DIAGNOSIS — E11.9 TYPE 2 DIABETES MELLITUS WITHOUT COMPLICATION, WITHOUT LONG-TERM CURRENT USE OF INSULIN (HCC): ICD-10-CM

## 2020-11-09 DIAGNOSIS — I25.10 CORONARY ARTERY DISEASE INVOLVING NATIVE CORONARY ARTERY OF NATIVE HEART WITHOUT ANGINA PECTORIS: ICD-10-CM

## 2020-11-09 PROCEDURE — 99214 OFFICE O/P EST MOD 30 MIN: CPT | Performed by: INTERNAL MEDICINE

## 2020-11-09 RX ORDER — GLIMEPIRIDE 1 MG/1
TABLET ORAL
Qty: 45 TABLET | Refills: 2 | Status: SHIPPED | OUTPATIENT
Start: 2020-11-09 | End: 2020-11-09

## 2020-11-09 RX ORDER — METFORMIN HYDROCHLORIDE 500 MG/1
500 TABLET, EXTENDED RELEASE ORAL
Qty: 90 TABLET | Refills: 1 | Status: SHIPPED | OUTPATIENT
Start: 2020-11-09 | End: 2021-04-30

## 2020-11-09 RX ORDER — IMIPRAMINE HCL 50 MG
50 TABLET ORAL NIGHTLY
COMMUNITY

## 2020-11-09 RX ORDER — MONTELUKAST SODIUM 10 MG/1
10 TABLET ORAL
Qty: 90 TABLET | Refills: 0 | Status: SHIPPED | OUTPATIENT
Start: 2020-11-09 | End: 2021-02-10

## 2020-11-09 NOTE — PROGRESS NOTES
subjective     Chief Complaint   Patient presents with   • Results     labs   • Coronary Artery Disease     Follow up   • Hyperlipidemia     Follow up   • Med Refill     Pending     History of Present Illness  Patient is 75 years old white male who is here for follow-up.  Patient had lab work done, blood sugar is elevated and triglycerides are also elevated.  He is overweight and has been trying to lose weight.    He was recently started on Tofranil by Dr. Fry, he was no if is okay.    He has known coronary artery disease status post myocardial infarction and RCA stenting, he denies any chest pain or palpitations.  No orthopnea PND or ankle edema.    Blood pressure is very well controlled.  Patient is taking lisinopril 10 mg twice a day.  He is diabetic and is taking Amaryl half milligram tablet daily.  Because of the cost he did not wish to take any other medications however he is willing to try Metformin.  Metformin  mg daily was started.  Patient was advised regarding recall on metformin XR he will discuss that with the pharmacist.  Environmental allergies are controlled with Singulair.  Memory loss is controlled with Aricept and Namenda.  Lab work will be reviewed.  He is overweight and has been trying to lose weight.  Past Surgical History:   Procedure Laterality Date   • CARDIAC CATHETERIZATION  08/2010   • CARDIOVASCULAR STRESS TEST  11/2014   • CARDIOVASCULAR STRESS TEST  11/2014   • COLONOSCOPY  09/01/2014   • COLONOSCOPY  09/2014   • CORONARY STENT PLACEMENT  2005   • CORONARY STENT PLACEMENT  2010   • ECHO - CONVERTED  08/2011   • OTHER SURGICAL HISTORY      Cath Stent Placement 2005 and 2010.   • PROSTATECTOMY      Radical     Family History   Problem Relation Age of Onset   • Coronary artery disease Other    • Cancer Other         Colon   • Alzheimer's disease Mother    • Cancer Sister    • No Known Problems Brother    • Heart disease Sister      Past Medical History:   Diagnosis Date   •  "Anxiety    • CAD (coronary artery disease)    • Caffeine use     2 Cups Daily.    • Depression    • Diabetes mellitus (CMS/HCC)    • Hyperlipidemia    • Hypertension    • Myocardial infarction (CMS/HCC)    • Obesity    • Prostate cancer (CMS/[facility])      Patient Active Problem List   Diagnosis   • CAD (coronary artery disease) status post myocardial infarction and PCI to the RCA in    • Diabetes mellitus (CMS/HCC)   • Mixed hyperlipidemia   • Depression   • Essential hypertension   • Obesity   • Prostate CA (CMS/[facility])   • Memory loss   • Environmental allergies   • Vitamin D deficiency   • Shortness of breath       Social History     Tobacco Use   • Smoking status: Former Smoker     Quit date: 1991     Years since quittin.5   • Smokeless tobacco: Never Used   Substance Use Topics   • Alcohol use: No   • Drug use: No       Allergies   Allergen Reactions   • Penicillins Other (See Comments)     Yrs ago went \"out of it\"     • Sulfa Antibiotics Hives       Current Outpatient Medications on File Prior to Visit   Medication Sig   • albuterol sulfate  (90 Base) MCG/ACT inhaler Inhale 2 puffs Every 4 (Four) Hours As Needed for Wheezing.   • aspirin 325 MG tablet Take 325 mg by mouth daily.   • cholecalciferol (VITAMIN D3) 1000 UNITS tablet Take 1,000 Units by mouth daily.   • donepezil (ARICEPT) 10 MG tablet TAKE ONE TABLET BY MOUTH ONCE NIGHTLY   • glucose blood test strip 1 each by Other route As Needed (prn). Use as instructed   • imipramine (TOFRANIL) 50 MG tablet Take 50 mg by mouth Every Night.   • Lancets misc    • lisinopril (PRINIVIL,ZESTRIL) 10 MG tablet Take 1 tablet by mouth 2 (Two) Times a Day.   • memantine (NAMENDA) 10 MG tablet TAKE ONE TABLET BY MOUTH TWICE A DAY   • Omeprazole Magnesium 10 MG pack Take 20 mg by mouth At Night As Needed (as needed for heartburn).   • rosuvastatin (CRESTOR) 20 MG tablet Take 1 tablet by mouth Daily.   • tiotropium (SPIRIVA) 18 MCG per inhalation capsule " "Place 1 capsule into inhaler and inhale Daily.   • vitamin B-12 (CYANOCOBALAMIN) 1000 MCG tablet Take 1,000 mcg by mouth daily.   • [DISCONTINUED] glimepiride (Amaryl) 1 MG tablet Take 1/2 tablet daily   • [DISCONTINUED] montelukast (SINGULAIR) 10 MG tablet Take 1 tablet by mouth every night at bedtime.     No current facility-administered medications on file prior to visit.          The following portions of the patient's history were reviewed and updated as appropriate: allergies, current medications, past family history, past medical history, past social history, past surgical history and problem list.    Review of Systems   Constitution: Negative.   HENT: Negative.  Negative for congestion.    Eyes: Negative.    Cardiovascular: Negative.  Negative for chest pain, cyanosis, dyspnea on exertion, irregular heartbeat, leg swelling, near-syncope, orthopnea, palpitations, paroxysmal nocturnal dyspnea and syncope.   Respiratory: Negative.  Negative for shortness of breath.    Hematologic/Lymphatic: Negative.    Musculoskeletal: Negative.    Gastrointestinal: Negative.    Neurological: Negative.  Negative for headaches.          Objective:     /66 (BP Location: Left arm, Patient Position: Sitting, Cuff Size: Adult)   Pulse 66   Temp 97.3 °F (36.3 °C)   Ht 180.3 cm (71\")   Wt 107 kg (235 lb)   SpO2 99%   BMI 32.78 kg/m²   Vitals signs and nursing note reviewed.   Constitutional:       General: Not in acute distress.     Appearance: Healthy appearance. Well-developed and not in distress. Not diaphoretic.   Eyes:      General: No scleral icterus.     Conjunctiva/sclera: Conjunctivae normal.      Pupils: Pupils are equal, round, and reactive to light.   HENT:      Head: Normocephalic and atraumatic.   Neck:      Musculoskeletal: Normal range of motion and neck supple.      Thyroid: Thyroid normal. No thyromegaly.      Vascular: No JVD. JVD normal.      Trachea: No tracheal deviation.      Lymphadenopathy: No " cervical adenopathy.   Pulmonary:      Effort: Pulmonary effort is normal. No respiratory distress.      Breath sounds: Normal breath sounds and air entry.   Chest:      Chest wall: Not tender to palpatation.   Cardiovascular:      PMI at left midclavicular line. Normal rate. Regular rhythm.      No gallop.   Pulses:     Intact distal pulses. No decreased pulses.   Abdominal:      General: Bowel sounds are normal. There is no distension or abdominal bruit.      Palpations: Abdomen is soft. There is no hepatomegaly, splenomegaly or abdominal mass.      Tenderness: There is no abdominal tenderness. There is no guarding or rebound.   Skin:     General: Skin is warm and dry. There is no cyanosis.      Coloration: Skin is not jaundiced or pale.      Findings: No erythema or rash.   Neurological:      Mental Status: Alert, oriented to person, place, and time and oriented to person, place and time.   Psychiatric:         Attention and Perception: Attention normal.         Mood and Affect: Mood and affect normal.         Speech: Speech normal.         Behavior: Behavior is cooperative.           Lab Review    Procedures       I personally viewed and interpreted the patient's LAB data         Assessment:     1. Mixed hyperlipidemia    2. Coronary artery disease involving native coronary artery of native heart without angina pectoris    3. Class 1 obesity due to excess calories without serious comorbidity with body mass index (BMI) of 33.0 to 33.9 in adult    4. Type 2 diabetes mellitus without complication, without long-term current use of insulin (CMS/[facility])    5. Memory loss    6. Essential hypertension          Plan:     Patient has hyperlipidemia, total cholesterol is 132 HDL 28 triglyceride 344 LDL 70.  He will continue Crestor 20 mg daily.  Weight loss healthy lifestyle emphasized.  Triglyceride is elevated because of obesity and uncontrolled diabetes mellitus.  Blood sugar is 146.  Renal functions are normal.   Hemoglobin A1c is 5.9.  Patient was advised to take metformin  daily and Amaryl was discontinued.  Weight loss and healthy lifestyle diet restrictions were stressed.  Blood pressure is very well controlled lisinopril was continued.  Is also stable from cardiac standpoint.  Follow-up scheduled          No follow-ups on file.

## 2020-11-23 RX ORDER — ROSUVASTATIN CALCIUM 20 MG/1
TABLET, COATED ORAL
Qty: 90 TABLET | Refills: 0 | Status: SHIPPED | OUTPATIENT
Start: 2020-11-23 | End: 2021-02-22

## 2020-12-01 RX ORDER — DONEPEZIL HYDROCHLORIDE 10 MG/1
TABLET, FILM COATED ORAL
Qty: 90 TABLET | Refills: 0 | Status: SHIPPED | OUTPATIENT
Start: 2020-12-01 | End: 2021-03-02

## 2020-12-01 RX ORDER — LANCETS 30 GAUGE
1 EACH MISCELLANEOUS DAILY
Qty: 100 EACH | Refills: 3 | Status: SHIPPED | OUTPATIENT
Start: 2020-12-01 | End: 2023-02-06 | Stop reason: SDUPTHER

## 2020-12-03 NOTE — TELEPHONE ENCOUNTER
Rose called stating his Fasting blood sugar in the mornings have been running in the 170's since he started on the metformin ER 500mg last office visit on Nov 9th.  He was taking glimepiride 1/2 mg daily. At that time his FBS were at 140's prior to change.  He stated his eating habits have not changed.    Please advise.

## 2020-12-03 NOTE — TELEPHONE ENCOUNTER
Called pt and given message per DR Rodney.  He agreed and thanked me for returning call.   Patient will check on his ins web site to see if it will cover this RX.  I informed him I could do a Prior Auth as well if needed.

## 2020-12-29 RX ORDER — MEMANTINE HYDROCHLORIDE 10 MG/1
TABLET ORAL
Qty: 180 TABLET | Refills: 0 | Status: SHIPPED | OUTPATIENT
Start: 2020-12-29 | End: 2021-04-01

## 2021-01-11 ENCOUNTER — TELEPHONE (OUTPATIENT)
Dept: CARDIOLOGY | Facility: CLINIC | Age: 76
End: 2021-01-11

## 2021-01-11 NOTE — TELEPHONE ENCOUNTER
Called and given message per DR Rodney.  Increase Farxiga to 10mg daily.  Advised patient to double his pills he has on hand and he will call me when he needs a refill.

## 2021-01-11 NOTE — TELEPHONE ENCOUNTER
Patient c/o blood sugar still running high fasting it is   1/11    164  1/10     170  1/9       170  Wants to know if he should do something else  He takes metformin 500mg and farxiga 5mg both in the AM

## 2021-01-19 RX ORDER — DAPAGLIFLOZIN 10 MG/1
10 TABLET, FILM COATED ORAL DAILY
Qty: 90 TABLET | Refills: 1 | Status: SHIPPED | OUTPATIENT
Start: 2021-01-19 | End: 2021-08-24

## 2021-01-26 ENCOUNTER — IMMUNIZATION (OUTPATIENT)
Dept: VACCINE CLINIC | Facility: HOSPITAL | Age: 76
End: 2021-01-26

## 2021-01-26 PROCEDURE — 0001A: CPT | Performed by: FAMILY MEDICINE

## 2021-01-26 PROCEDURE — 91300 HC SARSCOV02 VAC 30MCG/0.3ML IM: CPT | Performed by: FAMILY MEDICINE

## 2021-02-08 ENCOUNTER — OFFICE VISIT (OUTPATIENT)
Dept: CARDIOLOGY | Facility: CLINIC | Age: 76
End: 2021-02-08

## 2021-02-08 VITALS
WEIGHT: 228 LBS | BODY MASS INDEX: 31.92 KG/M2 | SYSTOLIC BLOOD PRESSURE: 116 MMHG | DIASTOLIC BLOOD PRESSURE: 78 MMHG | HEART RATE: 70 BPM | OXYGEN SATURATION: 99 % | HEIGHT: 71 IN | TEMPERATURE: 96.9 F

## 2021-02-08 DIAGNOSIS — E78.2 MIXED HYPERLIPIDEMIA: ICD-10-CM

## 2021-02-08 DIAGNOSIS — I10 ESSENTIAL HYPERTENSION: ICD-10-CM

## 2021-02-08 DIAGNOSIS — I25.10 CORONARY ARTERY DISEASE INVOLVING NATIVE CORONARY ARTERY OF NATIVE HEART WITHOUT ANGINA PECTORIS: Primary | ICD-10-CM

## 2021-02-08 DIAGNOSIS — E11.9 TYPE 2 DIABETES MELLITUS WITHOUT COMPLICATION, WITHOUT LONG-TERM CURRENT USE OF INSULIN (HCC): ICD-10-CM

## 2021-02-08 PROCEDURE — 99214 OFFICE O/P EST MOD 30 MIN: CPT | Performed by: INTERNAL MEDICINE

## 2021-02-08 NOTE — PROGRESS NOTES
subjective     Chief Complaint   Patient presents with   • Hyperlipidemia   • Hypertension   • Diabetes     History of Present Illness  Patient is 75 years old white male who is here for follow-up.  Patient has multiple chronic medical problems but he is doing well.  Patient is diabetic and has been taking Farxiga 10 mg daily and Metformin  mg daily.  Blood sugar is better controlled.  Blood pressure is also better controlled on lisinopril 10 twice daily  Patient also has hyperlipidemia and has been taking Crestor 20 mg daily  Patient complains of constipation  He also states that he had colonoscopy last year which was okay.  Memory loss is under control with Aricept and Namenda.    Past Surgical History:   Procedure Laterality Date   • CARDIAC CATHETERIZATION  08/2010   • CARDIOVASCULAR STRESS TEST  11/2014   • CARDIOVASCULAR STRESS TEST  11/2014   • COLONOSCOPY  09/01/2014   • COLONOSCOPY  09/2014   • CORONARY STENT PLACEMENT  2005   • CORONARY STENT PLACEMENT  2010   • ECHO - CONVERTED  08/2011   • OTHER SURGICAL HISTORY      Cath Stent Placement 2005 and 2010.   • PROSTATECTOMY      Radical     Family History   Problem Relation Age of Onset   • Coronary artery disease Other    • Cancer Other         Colon   • Alzheimer's disease Mother    • Cancer Sister    • No Known Problems Brother    • Heart disease Sister      Past Medical History:   Diagnosis Date   • Anxiety    • CAD (coronary artery disease)    • Caffeine use     2 Cups Daily.    • Depression    • Diabetes mellitus (CMS/HCC)    • Hyperlipidemia    • Hypertension    • Myocardial infarction (CMS/HCC)    • Obesity    • Prostate cancer (CMS/HCC)      Patient Active Problem List   Diagnosis   • CAD (coronary artery disease) status post myocardial infarction and PCI to the RCA in 2005   • Diabetes mellitus (CMS/HCC)   • Mixed hyperlipidemia   • Depression   • Essential hypertension   • Obesity   • Prostate CA (CMS/HCC)   • Memory loss   • Environmental  "allergies   • Vitamin D deficiency   • Shortness of breath       Social History     Tobacco Use   • Smoking status: Former Smoker     Quit date: 1991     Years since quittin.7   • Smokeless tobacco: Never Used   Substance Use Topics   • Alcohol use: No   • Drug use: No       Allergies   Allergen Reactions   • Penicillins Other (See Comments)     Yrs ago went \"out of it\"     • Sulfa Antibiotics Hives       Current Outpatient Medications on File Prior to Visit   Medication Sig   • albuterol sulfate  (90 Base) MCG/ACT inhaler Inhale 2 puffs Every 4 (Four) Hours As Needed for Wheezing.   • aspirin 325 MG tablet Take 325 mg by mouth daily.   • cholecalciferol (VITAMIN D3) 1000 UNITS tablet Take 1,000 Units by mouth daily.   • Dapagliflozin Propanediol (Farxiga) 10 MG tablet Take 10 mg by mouth Daily.   • donepezil (ARICEPT) 10 MG tablet TAKE ONE TABLET BY MOUTH ONCE NIGHTLY   • glucose blood test strip 1 each by Other route As Needed (prn). Use as instructed   • imipramine (TOFRANIL) 50 MG tablet Take 50 mg by mouth Every Night.   • Lancets misc 1 stick Daily.   • lisinopril (PRINIVIL,ZESTRIL) 10 MG tablet Take 1 tablet by mouth 2 (Two) Times a Day.   • memantine (NAMENDA) 10 MG tablet TAKE ONE TABLET BY MOUTH TWICE A DAY   • metFORMIN ER (GLUCOPHAGE-XR) 500 MG 24 hr tablet Take 1 tablet by mouth Daily With Breakfast.   • montelukast (SINGULAIR) 10 MG tablet Take 1 tablet by mouth every night at bedtime.   • Omeprazole Magnesium 10 MG pack Take 20 mg by mouth At Night As Needed (as needed for heartburn).   • rosuvastatin (CRESTOR) 20 MG tablet TAKE ONE TABLET BY MOUTH DAILY   • tiotropium (SPIRIVA) 18 MCG per inhalation capsule Place 1 capsule into inhaler and inhale Daily.   • vitamin B-12 (CYANOCOBALAMIN) 1000 MCG tablet Take 1,000 mcg by mouth daily.     No current facility-administered medications on file prior to visit.          The following portions of the patient's history were reviewed and " "updated as appropriate: allergies, current medications, past family history, past medical history, past social history, past surgical history and problem list.    Review of Systems   Constitution: Negative.   HENT: Negative.  Negative for congestion.    Eyes: Negative.    Cardiovascular: Negative.  Negative for chest pain, cyanosis, dyspnea on exertion, irregular heartbeat, leg swelling, near-syncope, orthopnea, palpitations, paroxysmal nocturnal dyspnea and syncope.   Respiratory: Negative.  Negative for shortness of breath.    Hematologic/Lymphatic: Negative.    Musculoskeletal: Negative.    Gastrointestinal: Positive for constipation.   Neurological: Negative.  Negative for headaches.          Objective:     /78 (BP Location: Left arm, Patient Position: Sitting, Cuff Size: Adult)   Pulse 70   Temp 96.9 °F (36.1 °C)   Ht 180.3 cm (71\")   Wt 103 kg (228 lb)   SpO2 99%   BMI 31.80 kg/m²   Vitals signs and nursing note reviewed.   Constitutional:       General: Not in acute distress.     Appearance: Healthy appearance. Well-developed and not in distress. Not diaphoretic.   Eyes:      General: No scleral icterus.     Conjunctiva/sclera: Conjunctivae normal.      Pupils: Pupils are equal, round, and reactive to light.   HENT:      Head: Normocephalic and atraumatic.   Neck:      Musculoskeletal: Normal range of motion and neck supple.      Thyroid: Thyroid normal. No thyromegaly.      Vascular: No JVD. JVD normal.      Trachea: No tracheal deviation.      Lymphadenopathy: No cervical adenopathy.   Pulmonary:      Effort: Pulmonary effort is normal. No respiratory distress.      Breath sounds: Normal breath sounds and air entry.   Chest:      Chest wall: Not tender to palpatation.   Cardiovascular:      PMI at left midclavicular line. Normal rate. Regular rhythm.      No gallop.   Pulses:     Intact distal pulses. No decreased pulses.   Abdominal:      General: Bowel sounds are normal. There is no distension " or abdominal bruit.      Palpations: Abdomen is soft. There is no hepatomegaly, splenomegaly or abdominal mass.      Tenderness: There is no abdominal tenderness. There is no guarding or rebound.   Skin:     General: Skin is warm and dry. There is no cyanosis.      Coloration: Skin is not jaundiced or pale.      Findings: No erythema or rash.   Neurological:      Mental Status: Alert, oriented to person, place, and time and oriented to person, place and time.   Psychiatric:         Attention and Perception: Attention normal.         Mood and Affect: Mood and affect normal.         Speech: Speech normal.         Behavior: Behavior is cooperative.           Lab Review        Procedures       I personally viewed and interpreted the patient's LAB data         Assessment:     1. Coronary artery disease involving native coronary artery of native heart without angina pectoris    2. Essential hypertension    3. Mixed hyperlipidemia    4. Type 2 diabetes mellitus without complication, without long-term current use of insulin (CMS/[facility])          Plan:     Patient is here for follow-up he has known coronary artery disease with prior myocardial infarction and PCI to RCA in 2005.  He is doing very well denies any chest pain palpitations or shortness of breath.  He has cardiac work-up done at .  He states that he cannot take metoprolol therapy because of sinus bradycardia.  Following closely with Dr. Guzman    He was advised to continue aspirin along with statin therapy .  Blood pressure is very well controlled he will continue lisinopril 20 mg daily  Salt restriction healthy lifestyle emphasized.    Diabetic control is also better.  He will continue Farxiga along with Glucophage Ex.  Aricept and Namenda was also continued.  Patient complains of constipation Linzess 72 mcg daily was started.  If patient has any side effect he will let me know.  Follow-up scheduled    No follow-ups on file.

## 2021-02-10 RX ORDER — MONTELUKAST SODIUM 10 MG/1
TABLET ORAL
Qty: 90 TABLET | Refills: 0 | Status: SHIPPED | OUTPATIENT
Start: 2021-02-10 | End: 2023-02-16 | Stop reason: SDUPTHER

## 2021-02-15 ENCOUNTER — DOCUMENTATION (OUTPATIENT)
Dept: CARDIOLOGY | Facility: CLINIC | Age: 76
End: 2021-02-15

## 2021-02-16 ENCOUNTER — IMMUNIZATION (OUTPATIENT)
Dept: VACCINE CLINIC | Facility: HOSPITAL | Age: 76
End: 2021-02-16

## 2021-02-16 PROCEDURE — 91300 HC SARSCOV02 VAC 30MCG/0.3ML IM: CPT | Performed by: INTERNAL MEDICINE

## 2021-02-16 PROCEDURE — 0002A: CPT | Performed by: INTERNAL MEDICINE

## 2021-02-22 RX ORDER — ROSUVASTATIN CALCIUM 20 MG/1
TABLET, COATED ORAL
Qty: 90 TABLET | Refills: 1 | Status: SHIPPED | OUTPATIENT
Start: 2021-02-22 | End: 2021-08-24

## 2021-03-02 RX ORDER — DONEPEZIL HYDROCHLORIDE 10 MG/1
TABLET, FILM COATED ORAL
Qty: 90 TABLET | Refills: 0 | Status: SHIPPED | OUTPATIENT
Start: 2021-03-02 | End: 2021-06-03

## 2021-03-11 ENCOUNTER — OFFICE VISIT (OUTPATIENT)
Dept: PULMONOLOGY | Facility: CLINIC | Age: 76
End: 2021-03-11

## 2021-03-11 VITALS
HEART RATE: 79 BPM | WEIGHT: 230 LBS | HEIGHT: 71 IN | TEMPERATURE: 98.2 F | OXYGEN SATURATION: 98 % | BODY MASS INDEX: 32.2 KG/M2 | SYSTOLIC BLOOD PRESSURE: 126 MMHG | DIASTOLIC BLOOD PRESSURE: 82 MMHG

## 2021-03-11 DIAGNOSIS — E66.09 CLASS 1 OBESITY DUE TO EXCESS CALORIES WITHOUT SERIOUS COMORBIDITY WITH BODY MASS INDEX (BMI) OF 33.0 TO 33.9 IN ADULT: ICD-10-CM

## 2021-03-11 DIAGNOSIS — R91.1 PULMONARY NODULE: Primary | ICD-10-CM

## 2021-03-11 DIAGNOSIS — J41.0 SIMPLE CHRONIC BRONCHITIS (HCC): ICD-10-CM

## 2021-03-11 PROCEDURE — 99214 OFFICE O/P EST MOD 30 MIN: CPT | Performed by: NURSE PRACTITIONER

## 2021-03-11 NOTE — PROGRESS NOTES
Have you had the Influenza Vaccine? yes    Would you like to receive this Vaccine today? no    Have you had the Pneumonia Vaccine?  yes   Would you like to receive this Vaccine today? no    Are you a current smoker? no  Quit date? 1991    Subjective    Rose Singer presents for the following Bronchitis      History of Present Illness     Mr. Singer is a 75 year old male with a medical history significant for CAD, depression, diabetes, hyperlipidemia, hypertension, MI and chronic bronchitis.    He presents today for routine visit on chronic bronchitis.  He voices no complaints at today's visit.  He reports improvement in his shortness of breath.  He is currently taking Spiriva once daily and using albuterol as needed.    Review of Systems   Constitutional: Negative for activity change, fatigue and unexpected weight change.   HENT: Negative for congestion, postnasal drip and rhinorrhea.    Respiratory: Negative for apnea, cough, chest tightness, shortness of breath and wheezing.    Cardiovascular: Negative for chest pain and palpitations.   Gastrointestinal: Negative for nausea.   Allergic/Immunologic: Negative for environmental allergies.   Psychiatric/Behavioral: Negative for agitation and confusion.       Active Problems:  Problem List Items Addressed This Visit        Endocrine and Metabolic    Obesity       Pulmonary and Pneumonias    Simple chronic bronchitis (CMS/HCC)      Other Visit Diagnoses     Pulmonary nodule    -  Primary          Past Medical History:  Past Medical History:   Diagnosis Date   • Anxiety    • CAD (coronary artery disease)    • Caffeine use     2 Cups Daily.    • Depression    • Diabetes mellitus (CMS/HCC)    • Hyperlipidemia    • Hypertension    • Myocardial infarction (CMS/HCC)    • Obesity    • Prostate cancer (CMS/HCC)        Family History:  Family History   Problem Relation Age of Onset   • Coronary artery disease Other    • Cancer Other         Colon   • Alzheimer's disease Mother    •  Cancer Sister    • No Known Problems Brother    • Heart disease Sister        Social History:  Social History     Tobacco Use   • Smoking status: Former Smoker     Quit date: 1991     Years since quittin.8   • Smokeless tobacco: Never Used   Substance Use Topics   • Alcohol use: No       Current Medications:  Current Outpatient Medications   Medication Sig Dispense Refill   • albuterol sulfate  (90 Base) MCG/ACT inhaler Inhale 2 puffs Every 4 (Four) Hours As Needed for Wheezing. 6.7 g 11   • aspirin 325 MG tablet Take 325 mg by mouth daily.     • cholecalciferol (VITAMIN D3) 1000 UNITS tablet Take 1,000 Units by mouth daily.     • Dapagliflozin Propanediol (Farxiga) 10 MG tablet Take 10 mg by mouth Daily. 90 tablet 1   • donepezil (ARICEPT) 10 MG tablet TAKE ONE TABLET BY MOUTH ONCE NIGHTLY 90 tablet 0   • glucose blood test strip 1 each by Other route As Needed (prn). Use as instructed 100 each 1   • imipramine (TOFRANIL) 50 MG tablet Take 50 mg by mouth Every Night.     • Lancets misc 1 stick Daily. 100 each 3   • linaclotide (LINZESS) 72 MCG capsule capsule Take 1 capsule by mouth Every Morning Before Breakfast. 30 capsule 2   • lisinopril (PRINIVIL,ZESTRIL) 10 MG tablet Take 1 tablet by mouth 2 (Two) Times a Day. 180 tablet 1   • memantine (NAMENDA) 10 MG tablet TAKE ONE TABLET BY MOUTH TWICE A  tablet 0   • metFORMIN ER (GLUCOPHAGE-XR) 500 MG 24 hr tablet Take 1 tablet by mouth Daily With Breakfast. 90 tablet 1   • montelukast (SINGULAIR) 10 MG tablet TAKE ONE TABLET BY MOUTH EVERY NIGHT AT BEDTIME 90 tablet 0   • Omeprazole Magnesium 10 MG pack Take 20 mg by mouth At Night As Needed (as needed for heartburn). 60 each 2   • rosuvastatin (CRESTOR) 20 MG tablet TAKE ONE TABLET BY MOUTH DAILY 90 tablet 1   • tiotropium (SPIRIVA) 18 MCG per inhalation capsule Place 1 capsule into inhaler and inhale Daily. 30 capsule 11   • vitamin B-12 (CYANOCOBALAMIN) 1000 MCG tablet Take 1,000 mcg by mouth  "daily.       No current facility-administered medications for this visit.       Allergies:  Allergies   Allergen Reactions   • Penicillins Other (See Comments)     Yrs ago went \"out of it\"     • Sulfa Antibiotics Hives       Vitals:  /82   Pulse 79   Temp 98.2 °F (36.8 °C) (Temporal)   Ht 180.3 cm (71\")   Wt 104 kg (230 lb)   SpO2 98%   BMI 32.08 kg/m²     Imaging:    Imaging Results (Most Recent)     None          Pulmonary Functions Testing Results:    No results found for: FEV1, FVC, QEC3FTP, TLC, DLCO    Results for orders placed or performed during the hospital encounter of 08/18/20   Adult Transthoracic Echo Complete W/ Cont if Necessary Per Protocol   Result Value Ref Range    BSA 2.3 m^2    IVSd 1.3 cm    IVSs 1.4 cm    LVIDd 4.7 cm    LVIDs 3.2 cm    LVPWd 1.3 cm    BH CV ECHO GREGORY - LVPWS 1.5 cm    IVS/LVPW 1.0     FS 31.6 %    EDV(Teich) 103.1 ml    ESV(Teich) 41.7 ml    EF(Teich) 59.5 %    EDV(cubed) 104.8 ml    ESV(cubed) 33.5 ml    EF(cubed) 68.0 %    % IVS thick 4.8 %    % LVPW thick 17.7 %    LV mass(C)d 239.9 grams    LV mass(C)dI 104.9 grams/m^2    LV mass(C)s 163.9 grams    LV mass(C)sI 71.7 grams/m^2    SV(Teich) 61.4 ml    SI(Teich) 26.8 ml/m^2    SV(cubed) 71.3 ml    SI(cubed) 31.2 ml/m^2    Ao root diam 3.8 cm    Ao root area 11.3 cm^2    ACS 2.6 cm    LA dimension 4.1 cm    LA/Ao 1.1     LVOT diam 2.3 cm    LVOT area 4.2 cm^2    LVOT area(traced) 4.2 cm^2    LVLd ap4 7.2 cm    EDV(MOD-sp4) 107.0 ml    LVLs ap4 6.3 cm    ESV(MOD-sp4) 39.6 ml    EF(MOD-sp4) 63.0 %    SV(MOD-sp4) 67.4 ml    SI(MOD-sp4) 29.5 ml/m^2    Ao root area (BSA corrected) 1.7     LV Salazar Vol (BSA corrected) 46.8 ml/m^2    LV Sys Vol (BSA corrected) 17.3 ml/m^2    MV E max jordan 60.8 cm/sec    MV A max jordan 53.6 cm/sec    MV E/A 1.1     Ao pk jordan 145.0 cm/sec    Ao max PG 8.4 mmHg    Ao V2 mean 98.9 cm/sec    Ao mean PG 4.0 mmHg    Ao V2 VTI 35.8 cm    SV(Ao) 406.0 ml    SI(Ao) 177.5 ml/m^2    PA acc time 0.08 sec "    PA pr(Accel) 44.4 mmHg     CV ECHO GREGORY - BZI_BMI 33.8 kilograms/m^2     CV ECHO GREGORY - BSA(HAYCOCK) 2.4 m^2     CV ECHO GREGORY - BZI_METRIC_WEIGHT 109.8 kg     CV ECHO GREGORY - BZI_METRIC_HEIGHT 180.3 cm    Target HR (85%) 125 bpm    Max. Pred. HR (100%) 147 bpm       Objective   Physical Exam      GENERAL APPEARANCE: Well developed, well nourished, alert and cooperative, and appears to be in no acute distress.    HEAD: normocephalic. Atraumatic.    EYES: PERRL, EOMI. Vision is grossly intact.    THROAT: Oral cavity and pharynx normal. No inflammation, swelling, exudate, or lesions.     NECK: Neck supple.  No thyromegaly.    CARDIAC: Normal S1 and S2. No S3, S4 or murmurs. Rhythm is regular.     RESPIRATORY:Bilateral air entry positive. Bilateral diminished breath sounds. No wheezing, crackles or rhonchi noted.    GI: Positive bowel sounds. Soft, nondistended, nontender.     MUSCULOSKELETAL: No significant deformity or joint abnormality. No edema. Peripheral pulses intact. No varicosities.    NEUROLOGICAL: Strength and sensation symmetric and intact throughout.     PSYCHIATRIC: The mental examination revealed the patient was oriented to person, place, and time.     Assessment/Plan      Simple Chronic Bronchitis:  -PFT was reviewed.  -Currently on Spiriva once daily.  -Using albuterol every 4 hours as needed.        Pulmonary nodule  -CT chest was reviewed.  CT shows a 3.9 mm soft tissue nodule noted in the left lung.  Will order repeat CT chest to further evaluate stability of nodule.    Patient's Body mass index is 32.08 kg/m². BMI is above normal parameters. Recommendations include: exercise counseling and nutrition counseling.              ICD-10-CM ICD-9-CM   1. Pulmonary nodule  R91.1 793.11   2. Simple chronic bronchitis (CMS/HCC)  J41.0 491.0   3. Class 1 obesity due to excess calories without serious comorbidity with body mass index (BMI) of 33.0 to 33.9 in adult  E66.09 278.00    Z68.33 V85.33        Return in about 6 months (around 9/11/2021).

## 2021-03-16 PROBLEM — J41.0 SIMPLE CHRONIC BRONCHITIS: Status: ACTIVE | Noted: 2021-03-16

## 2021-03-23 ENCOUNTER — HOSPITAL ENCOUNTER (OUTPATIENT)
Dept: CT IMAGING | Facility: HOSPITAL | Age: 76
Discharge: HOME | End: 2021-03-23
Admitting: NURSE PRACTITIONER

## 2021-03-23 DIAGNOSIS — R91.1 PULMONARY NODULE: ICD-10-CM

## 2021-03-23 PROCEDURE — 71250 CT THORAX DX C-: CPT

## 2021-03-23 PROCEDURE — 71250 CT THORAX DX C-: CPT | Performed by: RADIOLOGY

## 2021-03-30 ENCOUNTER — DOCUMENTATION (OUTPATIENT)
Dept: PULMONOLOGY | Facility: CLINIC | Age: 76
End: 2021-03-30

## 2021-03-30 NOTE — PROGRESS NOTES
Notified the patient of his CT Chest results.  Per the recommendations made by Dr. Penaloza, we will repeat the CT chest in 6 months.

## 2021-04-01 RX ORDER — MEMANTINE HYDROCHLORIDE 10 MG/1
TABLET ORAL
Qty: 180 TABLET | Refills: 0 | Status: SHIPPED | OUTPATIENT
Start: 2021-04-01 | End: 2021-06-29

## 2021-04-01 RX ORDER — LISINOPRIL 10 MG/1
TABLET ORAL
Qty: 180 TABLET | Refills: 0 | Status: SHIPPED | OUTPATIENT
Start: 2021-04-01 | End: 2021-06-29

## 2021-04-30 RX ORDER — METFORMIN HYDROCHLORIDE 500 MG/1
TABLET, EXTENDED RELEASE ORAL
Qty: 90 TABLET | Refills: 0 | Status: SHIPPED | OUTPATIENT
Start: 2021-04-30 | End: 2021-07-27

## 2021-05-05 DIAGNOSIS — E78.2 MIXED HYPERLIPIDEMIA: ICD-10-CM

## 2021-05-10 ENCOUNTER — OFFICE VISIT (OUTPATIENT)
Dept: CARDIOLOGY | Facility: CLINIC | Age: 76
End: 2021-05-10

## 2021-05-10 VITALS
SYSTOLIC BLOOD PRESSURE: 106 MMHG | DIASTOLIC BLOOD PRESSURE: 68 MMHG | HEIGHT: 71 IN | BODY MASS INDEX: 30.94 KG/M2 | TEMPERATURE: 96.8 F | WEIGHT: 221 LBS | HEART RATE: 68 BPM | OXYGEN SATURATION: 98 %

## 2021-05-10 DIAGNOSIS — R41.3 MEMORY LOSS: ICD-10-CM

## 2021-05-10 DIAGNOSIS — E78.2 MIXED HYPERLIPIDEMIA: ICD-10-CM

## 2021-05-10 DIAGNOSIS — I10 ESSENTIAL HYPERTENSION: ICD-10-CM

## 2021-05-10 DIAGNOSIS — I25.10 CORONARY ARTERY DISEASE INVOLVING NATIVE CORONARY ARTERY OF NATIVE HEART WITHOUT ANGINA PECTORIS: Primary | ICD-10-CM

## 2021-05-10 DIAGNOSIS — Z91.09 ENVIRONMENTAL ALLERGIES: ICD-10-CM

## 2021-05-10 DIAGNOSIS — E11.9 TYPE 2 DIABETES MELLITUS WITHOUT COMPLICATION, WITHOUT LONG-TERM CURRENT USE OF INSULIN (HCC): ICD-10-CM

## 2021-05-10 PROCEDURE — 99214 OFFICE O/P EST MOD 30 MIN: CPT | Performed by: INTERNAL MEDICINE

## 2021-05-10 PROCEDURE — 93000 ELECTROCARDIOGRAM COMPLETE: CPT | Performed by: INTERNAL MEDICINE

## 2021-05-10 NOTE — PROGRESS NOTES
subjective     Chief Complaint   Patient presents with   • Results     labs   • Coronary Artery Disease     Follow up   • Hyperlipidemia     Follow up     History of Present Illness  Patient is 75 years old white male who is here for follow-up.  Coronary artery disease status post myocardial infarction and PCI to RCA in 2005  Patient denies any chest pain palpitations or shortness of breath.  He has been followed with [facility] cardiology in [place] but wants to switch care here now.  We will get the old records.    He is taking antiplatelet therapy with aspirin 325 mg daily.    Essential hypertension  Patient is taking lisinopril 10 mg twice daily blood pressure is controlled.    Diabetes mellitus type 2  He is taking Metformin  mg daily blood sugar has been controlled.    Hyperlipidemia on Crestor 20 mg daily.    Patient also has memory loss and is taking Aricept and Namenda.    Past Surgical History:   Procedure Laterality Date   • CARDIAC CATHETERIZATION  08/2010   • CARDIOVASCULAR STRESS TEST  11/2014   • CARDIOVASCULAR STRESS TEST  11/2014   • COLONOSCOPY  09/01/2014   • COLONOSCOPY  09/2014   • CORONARY STENT PLACEMENT  2005   • CORONARY STENT PLACEMENT  2010   • ECHO - CONVERTED  08/2011   • OTHER SURGICAL HISTORY      Cath Stent Placement 2005 and 2010.   • PROSTATECTOMY      Radical     Family History   Problem Relation Age of Onset   • Coronary artery disease Other    • Cancer Other         Colon   • Alzheimer's disease Mother    • Cancer Sister    • No Known Problems Brother    • Heart disease Sister      Past Medical History:   Diagnosis Date   • Anxiety    • CAD (coronary artery disease)    • Caffeine use     2 Cups Daily.    • Depression    • Diabetes mellitus (CMS/HCC)    • Hyperlipidemia    • Hypertension    • Myocardial infarction (CMS/HCC)    • Obesity    • Prostate cancer (CMS/HCC)      Patient Active Problem List   Diagnosis   • CAD (coronary artery disease) status post myocardial  "infarction and PCI to the RCA in    • Diabetes mellitus (CMS/[facility])   • Mixed hyperlipidemia   • Depression   • Essential hypertension   • Obesity   • Prostate CA (CMS/HCC)   • Memory loss   • Environmental allergies   • Vitamin D deficiency   • Shortness of breath   • Simple chronic bronchitis (CMS/[facility])       Social History     Tobacco Use   • Smoking status: Former Smoker     Quit date: 1991     Years since quittin.0   • Smokeless tobacco: Never Used   Vaping Use   • Vaping Use: Never used   Substance Use Topics   • Alcohol use: No   • Drug use: No       Allergies   Allergen Reactions   • Penicillins Other (See Comments)     Yrs ago went \"out of it\"     • Sulfa Antibiotics Hives       Current Outpatient Medications on File Prior to Visit   Medication Sig   • albuterol sulfate  (90 Base) MCG/ACT inhaler Inhale 2 puffs Every 4 (Four) Hours As Needed for Wheezing.   • aspirin 325 MG tablet Take 325 mg by mouth daily.   • cholecalciferol (VITAMIN D3) 1000 UNITS tablet Take 1,000 Units by mouth daily.   • Dapagliflozin Propanediol (Farxiga) 10 MG tablet Take 10 mg by mouth Daily.   • donepezil (ARICEPT) 10 MG tablet TAKE ONE TABLET BY MOUTH ONCE NIGHTLY   • glucose blood test strip 1 each by Other route As Needed (prn). Use as instructed   • imipramine (TOFRANIL) 50 MG tablet Take 50 mg by mouth Every Night.   • Lancets misc 1 stick Daily.   • linaclotide (LINZESS) 72 MCG capsule capsule Take 1 capsule by mouth Every Morning Before Breakfast.   • lisinopril (PRINIVIL,ZESTRIL) 10 MG tablet TAKE ONE TABLET BY MOUTH TWICE A DAY   • memantine (NAMENDA) 10 MG tablet TAKE ONE TABLET BY MOUTH TWICE A DAY   • metFORMIN ER (GLUCOPHAGE-XR) 500 MG 24 hr tablet TAKE ONE TABLET BY MOUTH DAILY WITH BREAKFAST   • montelukast (SINGULAIR) 10 MG tablet TAKE ONE TABLET BY MOUTH EVERY NIGHT AT BEDTIME   • Omeprazole Magnesium 10 MG pack Take 20 mg by mouth At Night As Needed (as needed for heartburn).   • rosuvastatin " "(CRESTOR) 20 MG tablet TAKE ONE TABLET BY MOUTH DAILY   • tiotropium (SPIRIVA) 18 MCG per inhalation capsule Place 1 capsule into inhaler and inhale Daily.   • vitamin B-12 (CYANOCOBALAMIN) 1000 MCG tablet Take 1,000 mcg by mouth daily.     No current facility-administered medications on file prior to visit.         The following portions of the patient's history were reviewed and updated as appropriate: allergies, current medications, past family history, past medical history, past social history, past surgical history and problem list.    ROS       Objective:     /68 (BP Location: Left arm, Patient Position: Sitting, Cuff Size: Adult)   Pulse 68   Temp 96.8 °F (36 °C)   Ht 180.3 cm (71\")   Wt 100 kg (221 lb)   SpO2 98%   BMI 30.82 kg/m²   Vitals and nursing note reviewed.   Constitutional:       Appearance: Healthy appearance. Not in distress.   Neck:      Vascular: No JVR. JVD normal.   Pulmonary:      Effort: Pulmonary effort is normal.      Breath sounds: Normal breath sounds. No stridor. No wheezing. No rhonchi. No rales.   Chest:      Chest wall: Not tender to palpatation.   Abdominal:      General: Bowel sounds are normal.      Palpations: Abdomen is soft.      Tenderness: There is no abdominal tenderness.   Musculoskeletal: Normal range of motion.         General: No tenderness. Skin:     General: Skin is warm and dry.   Neurological:      General: No focal deficit present.      Mental Status: Alert and oriented to person, place and time.           Lab Review  Lab Results   Component Value Date     01/18/2017    K 4.8 01/18/2017     01/18/2017    BUN 11 01/18/2017    CREATININE 1.01 01/18/2017    GLUCOSE 136 (H) 01/18/2017    CALCIUM 9.8 01/18/2017    ALT 24 01/18/2017    ALKPHOS 50 01/18/2017    LABIL2 1.6 02/10/2016     Lab Results   Component Value Date    CKTOTAL 76 01/18/2017     Lab Results   Component Value Date    WBC 6.87 01/18/2017    HGB 15.8 01/18/2017    HCT 46.2 " 01/18/2017     01/18/2017     No results found for: INR  No results found for: MG  Lab Results   Component Value Date    PSA 0.10 02/10/2016     No results found for: BNP  Lab Results   Component Value Date    CHLPL 143 02/10/2016    CHOL 146 01/18/2017    TRIG 121 01/18/2017    HDL 31 (L) 01/18/2017    VLDL 24.2 01/18/2017    LDLHDL 2.93 01/18/2017     Lab Results   Component Value Date    LDL 91 01/18/2017    LDL 88 08/12/2016         ECG 12 Lead    Date/Time: 5/10/2021 1:10 PM  Performed by: Adryan Rodney MD  Authorized by: Adryan Rodney MD   Comparison: compared with previous ECG from 11/3/2016  Comparison to previous ECG: Nonspecific ST-T changes otherwise no significant change  Rhythm: sinus rhythm  Rate: normal  BPM: 68  Conduction: conduction normal  QRS axis: normal  Other findings: non-specific ST-T wave changes    Clinical impression: non-specific ECG               I personally viewed and interpreted the patient's LAB data         Assessment:     1. Coronary artery disease involving native coronary artery of native heart without angina pectoris    2. Essential hypertension    3. Mixed hyperlipidemia    4. Memory loss    5. Environmental allergies    6. Type 2 diabetes mellitus without complication, without long-term current use of insulin (CMS/[facility])          Plan:     Coronary artery disease status post PCI to the RCA  Patient is doing very well he was advised to continue antiplatelet therapy along with beta-blocker therapy and statin therapy with Crestor.  He is currently being followed at [facility] cardiology but wants to switch care here and will get the old records.    Hyperlipidemia Crestor 20 mg daily was continued.  Lab work have been normal.    Essential hypertension  Patient will continue lisinopril    Environmental allergies  Singulair continued.    Diabetes mellitus Metformin continued.    Memory loss Aricept and Namenda were continued.  EKG does not show  any acute changes.  Follow-up scheduled          No follow-ups on file.

## 2021-06-03 RX ORDER — DONEPEZIL HYDROCHLORIDE 10 MG/1
TABLET, FILM COATED ORAL
Qty: 90 TABLET | Refills: 0 | Status: SHIPPED | OUTPATIENT
Start: 2021-06-03 | End: 2021-08-24

## 2021-06-16 ENCOUNTER — TELEPHONE (OUTPATIENT)
Dept: CARDIOLOGY | Facility: CLINIC | Age: 76
End: 2021-06-16

## 2021-06-16 RX ORDER — NITROGLYCERIN 0.4 MG/1
TABLET SUBLINGUAL
Qty: 100 TABLET | Refills: 11 | Status: SHIPPED | OUTPATIENT
Start: 2021-06-16

## 2021-06-29 RX ORDER — MEMANTINE HYDROCHLORIDE 10 MG/1
TABLET ORAL
Qty: 180 TABLET | Refills: 0 | Status: SHIPPED | OUTPATIENT
Start: 2021-06-29 | End: 2021-09-21

## 2021-06-29 RX ORDER — LISINOPRIL 10 MG/1
TABLET ORAL
Qty: 180 TABLET | Refills: 0 | Status: SHIPPED | OUTPATIENT
Start: 2021-06-29 | End: 2021-09-21

## 2021-07-27 RX ORDER — METFORMIN HYDROCHLORIDE 500 MG/1
TABLET, EXTENDED RELEASE ORAL
Qty: 90 TABLET | Refills: 0 | Status: SHIPPED | OUTPATIENT
Start: 2021-07-27 | End: 2021-10-26

## 2021-08-09 ENCOUNTER — OFFICE VISIT (OUTPATIENT)
Dept: CARDIOLOGY | Facility: CLINIC | Age: 76
End: 2021-08-09

## 2021-08-09 VITALS
OXYGEN SATURATION: 99 % | BODY MASS INDEX: 30.66 KG/M2 | HEIGHT: 71 IN | SYSTOLIC BLOOD PRESSURE: 124 MMHG | DIASTOLIC BLOOD PRESSURE: 70 MMHG | WEIGHT: 219 LBS | HEART RATE: 62 BPM | TEMPERATURE: 97.1 F

## 2021-08-09 DIAGNOSIS — E78.2 MIXED HYPERLIPIDEMIA: ICD-10-CM

## 2021-08-09 DIAGNOSIS — I25.10 CORONARY ARTERY DISEASE INVOLVING NATIVE CORONARY ARTERY OF NATIVE HEART WITHOUT ANGINA PECTORIS: Primary | ICD-10-CM

## 2021-08-09 DIAGNOSIS — I10 ESSENTIAL HYPERTENSION: ICD-10-CM

## 2021-08-09 DIAGNOSIS — R41.3 MEMORY LOSS: ICD-10-CM

## 2021-08-09 DIAGNOSIS — E11.9 TYPE 2 DIABETES MELLITUS WITHOUT COMPLICATION, WITHOUT LONG-TERM CURRENT USE OF INSULIN (HCC): ICD-10-CM

## 2021-08-09 PROCEDURE — 99214 OFFICE O/P EST MOD 30 MIN: CPT | Performed by: INTERNAL MEDICINE

## 2021-08-09 NOTE — PROGRESS NOTES
subjective     Chief Complaint   Patient presents with   • Coronary artery disease involving native coronary artery of      History of Present Illness  Patient is 76 years old white male who is here for follow-up of multiple chronic medical problems.  Patient has known coronary artery disease status post myocardial infarction and PCI to RCA.  He is doing very well he denies any chest pain palpitations or shortness of breath.  He is taking his medications regularly.    Hyperlipidemia  Patient is trying to follow diet and is taking Crestor 20 mg daily no drug side effects.    Diabetes mellitus type 2  Blood pressure was elevated last time however hemoglobin A1c was 6.6 he is taking Metformin along with farxiga and is also trying to follow diet.    Essential hypertension  Blood pressure is controlled with lisinopril    Patient complains of anxiety  He is taking Toprol at night primarily for prostate problems but has helping his depression and anxiety also.  He was offered Zoloft but patient declined    Past Surgical History:   Procedure Laterality Date   • CARDIAC CATHETERIZATION  08/2010   • CARDIOVASCULAR STRESS TEST  11/2014   • CARDIOVASCULAR STRESS TEST  11/2014   • COLONOSCOPY  09/01/2014   • COLONOSCOPY  09/2014   • CORONARY STENT PLACEMENT  2005   • CORONARY STENT PLACEMENT  2010   • ECHO - CONVERTED  08/2011   • OTHER SURGICAL HISTORY      Cath Stent Placement 2005 and 2010.   • PROSTATECTOMY      Radical     Family History   Problem Relation Age of Onset   • Coronary artery disease Other    • Cancer Other         Colon   • Alzheimer's disease Mother    • Cancer Sister    • No Known Problems Brother    • Heart disease Sister      Past Medical History:   Diagnosis Date   • Anxiety    • CAD (coronary artery disease)    • Caffeine use     2 Cups Daily.    • Depression    • Diabetes mellitus (CMS/HCC)    • Hyperlipidemia    • Hypertension    • Myocardial infarction (CMS/HCC)    • Obesity    • Prostate cancer  "(CMS/[facility])      Patient Active Problem List   Diagnosis   • CAD (coronary artery disease) status post myocardial infarction and PCI to the RCA in    • Diabetes mellitus (CMS/HCC)   • Mixed hyperlipidemia   • Depression   • Essential hypertension   • Obesity   • Prostate CA (CMS/[facility])   • Memory loss   • Environmental allergies   • Vitamin D deficiency   • Shortness of breath   • Simple chronic bronchitis (CMS/[facility])       Social History     Tobacco Use   • Smoking status: Former Smoker     Quit date: 1991     Years since quittin.2   • Smokeless tobacco: Never Used   Vaping Use   • Vaping Use: Never used   Substance Use Topics   • Alcohol use: No   • Drug use: No       Allergies   Allergen Reactions   • Penicillins Other (See Comments)     Yrs ago went \"out of it\"     • Sulfa Antibiotics Hives       Current Outpatient Medications on File Prior to Visit   Medication Sig   • albuterol sulfate  (90 Base) MCG/ACT inhaler Inhale 2 puffs Every 4 (Four) Hours As Needed for Wheezing.   • aspirin 325 MG tablet Take 325 mg by mouth daily.   • cholecalciferol (VITAMIN D3) 1000 UNITS tablet Take 1,000 Units by mouth daily.   • Dapagliflozin Propanediol (Farxiga) 10 MG tablet Take 10 mg by mouth Daily.   • donepezil (ARICEPT) 10 MG tablet TAKE ONE TABLET BY MOUTH ONCE NIGHTLY   • glucose blood test strip 1 each by Other route As Needed (prn). Use as instructed   • imipramine (TOFRANIL) 50 MG tablet Take 50 mg by mouth Every Night.   • Lancets misc 1 stick Daily.   • linaclotide (LINZESS) 72 MCG capsule capsule Take 1 capsule by mouth Every Morning Before Breakfast.   • lisinopril (PRINIVIL,ZESTRIL) 10 MG tablet TAKE ONE TABLET BY MOUTH TWICE A DAY   • memantine (NAMENDA) 10 MG tablet TAKE ONE TABLET BY MOUTH TWICE A DAY   • metFORMIN ER (GLUCOPHAGE-XR) 500 MG 24 hr tablet TAKE ONE TABLET BY MOUTH DAILY WITH BREAKFAST   • montelukast (SINGULAIR) 10 MG tablet TAKE ONE TABLET BY MOUTH EVERY NIGHT AT BEDTIME   • " "nitroglycerin (NITROSTAT) 0.4 MG SL tablet 1 under the tongue as needed for angina, may repeat q5mins for up three doses   • Omeprazole Magnesium 10 MG pack Take 20 mg by mouth At Night As Needed (as needed for heartburn).   • rosuvastatin (CRESTOR) 20 MG tablet TAKE ONE TABLET BY MOUTH DAILY   • tiotropium (SPIRIVA) 18 MCG per inhalation capsule Place 1 capsule into inhaler and inhale Daily.   • vitamin B-12 (CYANOCOBALAMIN) 1000 MCG tablet Take 1,000 mcg by mouth daily.     No current facility-administered medications on file prior to visit.         The following portions of the patient's history were reviewed and updated as appropriate: allergies, current medications, past family history, past medical history, past social history, past surgical history and problem list.    Review of Systems   Constitutional: Negative.   HENT: Negative.  Negative for congestion.    Eyes: Negative.    Cardiovascular: Negative.  Negative for chest pain, cyanosis, dyspnea on exertion, irregular heartbeat, leg swelling, near-syncope, orthopnea, palpitations, paroxysmal nocturnal dyspnea and syncope.   Respiratory: Negative.  Negative for shortness of breath.    Hematologic/Lymphatic: Negative.    Musculoskeletal: Negative.    Gastrointestinal: Negative.    Neurological: Negative.  Negative for headaches.   Psychiatric/Behavioral: The patient is nervous/anxious.           Objective:     /70 (BP Location: Left arm, Patient Position: Sitting, Cuff Size: Adult)   Pulse 62   Temp 97.1 °F (36.2 °C) (Infrared)   Ht 180.3 cm (71\")   Wt 99.3 kg (219 lb)   SpO2 99%   BMI 30.54 kg/m²   Pulmonary:      Effort: Pulmonary effort is normal.      Breath sounds: Normal breath sounds. No stridor. No wheezing. No rhonchi. No rales.   Cardiovascular:      PMI at left midclavicular line. Normal rate. Regular rhythm. Normal S1. Normal S2.      Murmurs: There is no murmur.      No gallop. No click. No rub.   Pulses:     Intact distal pulses. "   Edema:     Peripheral edema absent.           Lab Review  Lab work scheduled for next visit including  CBC  CMP  Lipid panel  CK  TSH      Procedures       I personally viewed and interpreted the patient's LAB data         Assessment:     1. Coronary artery disease involving native coronary artery of native heart without angina pectoris    2. Mixed hyperlipidemia    3. Essential hypertension    4. Type 2 diabetes mellitus without complication, without long-term current use of insulin (CMS/[facility])    5. Memory loss          Plan:     Coronary artery disease status post MI and PCI  Patient is doing very well he is asymptomatic he was advised to continue antiplatelet therapy along with statin therapy with Crestor  Patient is not taking any beta-blocker therapy because of COPD environmental allergies  COPD is better with Spiriva and albuterol  Memory loss is very well controlled with Aricept and Namenda which will be continued.  Essential hypertension  Blood pressure is very well controlled lisinopril 10 mg daily was continued.    Hyperlipidemia  Crestor continued healthy lifestyle emphasized.    Diabetes mellitus Metformin and Farxiga continued healthy lifestyle again emphasized lab work scheduled for next visit.        No follow-ups on file.

## 2021-08-24 RX ORDER — DAPAGLIFLOZIN 10 MG/1
TABLET, FILM COATED ORAL
Qty: 30 TABLET | Refills: 2 | Status: SHIPPED | OUTPATIENT
Start: 2021-08-24 | End: 2022-05-25 | Stop reason: SDUPTHER

## 2021-08-24 RX ORDER — ROSUVASTATIN CALCIUM 20 MG/1
TABLET, COATED ORAL
Qty: 90 TABLET | Refills: 1 | Status: SHIPPED | OUTPATIENT
Start: 2021-08-24 | End: 2022-02-22

## 2021-08-24 RX ORDER — DONEPEZIL HYDROCHLORIDE 10 MG/1
TABLET, FILM COATED ORAL
Qty: 90 TABLET | Refills: 0 | Status: SHIPPED | OUTPATIENT
Start: 2021-08-24 | End: 2021-11-23

## 2021-09-14 ENCOUNTER — TRANSCRIBE ORDERS (OUTPATIENT)
Dept: ADMINISTRATIVE | Facility: HOSPITAL | Age: 76
End: 2021-09-14

## 2021-09-14 ENCOUNTER — LAB (OUTPATIENT)
Dept: LAB | Facility: HOSPITAL | Age: 76
End: 2021-09-14

## 2021-09-14 DIAGNOSIS — Z11.52 ENCOUNTER FOR SCREENING FOR COVID-19: ICD-10-CM

## 2021-09-14 DIAGNOSIS — Z11.52 ENCOUNTER FOR SCREENING FOR COVID-19: Primary | ICD-10-CM

## 2021-09-14 PROCEDURE — C9803 HOPD COVID-19 SPEC COLLECT: HCPCS

## 2021-09-14 PROCEDURE — U0004 COV-19 TEST NON-CDC HGH THRU: HCPCS | Performed by: FAMILY MEDICINE

## 2021-09-15 LAB — SARS-COV-2 RNA NOSE QL NAA+PROBE: NOT DETECTED

## 2021-09-16 ENCOUNTER — IMMUNIZATION (OUTPATIENT)
Dept: VACCINE CLINIC | Facility: HOSPITAL | Age: 76
End: 2021-09-16

## 2021-09-16 PROCEDURE — 91300 HC SARSCOV02 VAC 30MCG/0.3ML IM: CPT | Performed by: INTERNAL MEDICINE

## 2021-09-16 PROCEDURE — 0003A: CPT | Performed by: INTERNAL MEDICINE

## 2021-09-21 RX ORDER — LISINOPRIL 10 MG/1
TABLET ORAL
Qty: 180 TABLET | Refills: 0 | Status: SHIPPED | OUTPATIENT
Start: 2021-09-21 | End: 2021-12-21

## 2021-09-21 RX ORDER — MEMANTINE HYDROCHLORIDE 10 MG/1
TABLET ORAL
Qty: 180 TABLET | Refills: 0 | Status: SHIPPED | OUTPATIENT
Start: 2021-09-21 | End: 2021-12-21

## 2021-10-26 RX ORDER — METFORMIN HYDROCHLORIDE 500 MG/1
TABLET, EXTENDED RELEASE ORAL
Qty: 90 TABLET | Refills: 0 | Status: SHIPPED | OUTPATIENT
Start: 2021-10-26 | End: 2021-12-28

## 2021-11-05 RX ORDER — DIPHENHYDRAMINE HYDROCHLORIDE 25 MG/1
1 CAPSULE, LIQUID FILLED ORAL DAILY
Qty: 1 EACH | Refills: 0 | Status: SHIPPED | OUTPATIENT
Start: 2021-11-05

## 2021-11-10 ENCOUNTER — OFFICE VISIT (OUTPATIENT)
Dept: PULMONOLOGY | Facility: CLINIC | Age: 76
End: 2021-11-10

## 2021-11-10 VITALS
BODY MASS INDEX: 30.8 KG/M2 | OXYGEN SATURATION: 97 % | SYSTOLIC BLOOD PRESSURE: 136 MMHG | WEIGHT: 220 LBS | HEIGHT: 71 IN | HEART RATE: 74 BPM | DIASTOLIC BLOOD PRESSURE: 78 MMHG | TEMPERATURE: 98.2 F

## 2021-11-10 DIAGNOSIS — R91.8 MULTIPLE PULMONARY NODULES: ICD-10-CM

## 2021-11-10 DIAGNOSIS — J41.0 SIMPLE CHRONIC BRONCHITIS (HCC): Primary | ICD-10-CM

## 2021-11-10 PROCEDURE — 99214 OFFICE O/P EST MOD 30 MIN: CPT | Performed by: PHYSICIAN ASSISTANT

## 2021-11-10 RX ORDER — ALBUTEROL SULFATE 90 UG/1
2 AEROSOL, METERED RESPIRATORY (INHALATION) EVERY 4 HOURS PRN
Qty: 6.7 G | Refills: 11 | Status: SHIPPED | OUTPATIENT
Start: 2021-11-10

## 2021-11-10 RX ORDER — TIOTROPIUM BROMIDE INHALATION SPRAY 3.12 UG/1
2 SPRAY, METERED RESPIRATORY (INHALATION)
Qty: 1 EACH | Refills: 8 | Status: SHIPPED | OUTPATIENT
Start: 2021-11-10 | End: 2022-03-11

## 2021-11-10 NOTE — PROGRESS NOTES
"Chief Complaint  Lung Nodule    Subjective          Rose Singer presents to [facility]ULMON[facility]IT[facility]IC[facility]  History of Present Illness     Patient presents today for previously noted pulmonary nodules in the setting of prostate cancer and chronic bronchitis/COPD.  He states that he most recently developed some nasal congestion associated with the recent weather changes, worsened with being outdoors.  This also causes some increased shortness of breath at times. Otherwise, he feels like that he is doing well. No hemoptysis.  He however states he responded very well to the Spiriva inhaler, currently using the Respimat version.  He is also previously tried the HandiHaler version.  He states he is following up with  due to prostate cancer.  They believe the lab work that a underlying malignancy remains present, but have not yet been able to visualize it.  He is scheduled for PET scan with a special type of dye, but has to wait until this becomes available. They will contact him to schedule the imaging as soon as it becomes available within the next 1-2 months.     Objective   Vital Signs:   /78   Pulse 74   Temp 98.2 °F (36.8 °C) (Temporal)   Ht 180.3 cm (71\")   Wt 99.8 kg (220 lb)   SpO2 97%   BMI 30.68 kg/m²       Physical Exam  Vitals reviewed.   Constitutional:       General: He is not in acute distress.     Appearance: He is well-developed. He is not diaphoretic.   HENT:      Head: Normocephalic and atraumatic.   Neck:      Thyroid: No thyromegaly.   Cardiovascular:      Rate and Rhythm: Normal rate and regular rhythm.      Heart sounds: Normal heart sounds, S1 normal and S2 normal.   Pulmonary:      Effort: Pulmonary effort is normal.      Breath sounds: No wheezing, rhonchi or rales.   Neurological:      Mental Status: He is alert and oriented to person, place, and time.   Psychiatric:         Behavior: Behavior normal.            Result Review :   The following data " was reviewed by: Isabel Contreras PA-C on 11/10/2021:    Data reviewed: Radiologic studies CT chest August 2020 and March 2021 and previous PFT.          Assessment and Plan    Diagnoses and all orders for this visit:    1. Simple chronic bronchitis (HCC) (Primary)  -     tiotropium bromide monohydrate (Spiriva Respimat) 2.5 MCG/ACT aerosol solution inhaler; Inhale 2 puffs Daily.  Dispense: 1 each; Refill: 8  -     albuterol sulfate  (90 Base) MCG/ACT inhaler; Inhale 2 puffs Every 4 (Four) Hours As Needed for Wheezing.  Dispense: 6.7 g; Refill: 11    2. Multiple pulmonary nodules        Pulmonary nodules:  Previous CT scan notable for multiple calcified pulmonary nodules.  Also notable for a groundglass nodule of the left lower lobe.  Previous report suggested 3.9 mm, which was likely a typo.  CT report from March 2021 suggested that it measured 7.9 mm, (more accurate upon personal measurements) and now measured approximately 5.2 mm.  Calcified nodule stable.  · Patient prefers to await repeat CT chest at this time as he is scheduled with  for upcoming PET scan within the next few months.  We will obtain records once available.      COPD, chronic bronchitis:  · Continue albuterol inhaler as needed  · Continue Spiriva Respimat 2.5 mcg 2 puffs daily  · Sample was given for trial of Stiolto.  Previous PFT notable for moderate COPD.    If no symptomatic improvement, he will resume Spiriva Respimat.    If beneficial, he will call our office for a prescription.    · Recommend Flonase for sinus congestion as needed.        Follow Up   Return in about 4 months (around 3/10/2022) for Next scheduled follow up.  Patient was given instructions and counseling regarding his condition or for health maintenance advice. Please see specific information pulled into the AVS if appropriate.

## 2021-11-23 RX ORDER — DONEPEZIL HYDROCHLORIDE 10 MG/1
TABLET, FILM COATED ORAL
Qty: 90 TABLET | Refills: 0 | Status: SHIPPED | OUTPATIENT
Start: 2021-11-23 | End: 2022-02-22

## 2021-11-24 DIAGNOSIS — E78.2 MIXED HYPERLIPIDEMIA: ICD-10-CM

## 2021-12-01 ENCOUNTER — OFFICE VISIT (OUTPATIENT)
Dept: CARDIOLOGY | Facility: CLINIC | Age: 76
End: 2021-12-01

## 2021-12-01 VITALS
HEART RATE: 79 BPM | DIASTOLIC BLOOD PRESSURE: 72 MMHG | BODY MASS INDEX: 30.66 KG/M2 | HEIGHT: 71 IN | TEMPERATURE: 97.7 F | OXYGEN SATURATION: 98 % | WEIGHT: 219 LBS | SYSTOLIC BLOOD PRESSURE: 116 MMHG

## 2021-12-01 DIAGNOSIS — E11.9 TYPE 2 DIABETES MELLITUS WITHOUT COMPLICATION, WITHOUT LONG-TERM CURRENT USE OF INSULIN (HCC): ICD-10-CM

## 2021-12-01 DIAGNOSIS — I25.10 CORONARY ARTERY DISEASE INVOLVING NATIVE CORONARY ARTERY OF NATIVE HEART WITHOUT ANGINA PECTORIS: Primary | ICD-10-CM

## 2021-12-01 DIAGNOSIS — E78.2 MIXED HYPERLIPIDEMIA: ICD-10-CM

## 2021-12-01 DIAGNOSIS — C61 PROSTATE CA (HCC): ICD-10-CM

## 2021-12-01 DIAGNOSIS — R41.3 MEMORY LOSS: ICD-10-CM

## 2021-12-01 DIAGNOSIS — I10 ESSENTIAL HYPERTENSION: ICD-10-CM

## 2021-12-01 PROCEDURE — 99214 OFFICE O/P EST MOD 30 MIN: CPT | Performed by: INTERNAL MEDICINE

## 2021-12-01 NOTE — PROGRESS NOTES
subjective     Chief Complaint   Patient presents with   • Coronary Artery Disease     follow up   • Hypertension     follow up   • Results     labs     History of Present Illness  Patient is 76 years old white male who is here for follow-up.  Coronary artery disease  Patient states that he is doing well denies any chest pain palpitations or shortness of breath.  He is taking his medications regularly.  Patient is taking aspirin, statin therapy however no beta-blocker therapy because of COPD.  Hyperlipidemia  Patient is taking Crestor 20 mg daily which is tolerating very well.    Hypertension  Blood pressure is very well controlled with lisinopril    Diabetes mellitus type 2  Patient is trying to follow diet and is taking farxiga and Metformin.    GERD controlled with omeprazole    Patient has mild memory loss and has been taking Aricept and Namenda without any drug side effects.  Bowel movements are normal with Linzess.  Overall patient is doing very well.     he had lab work done which will be reviewed        Past Surgical History:   Procedure Laterality Date   • CARDIAC CATHETERIZATION  08/2010   • CARDIOVASCULAR STRESS TEST  11/2014   • CARDIOVASCULAR STRESS TEST  11/2014   • COLONOSCOPY  09/01/2014   • COLONOSCOPY  09/2014   • CORONARY STENT PLACEMENT  2005   • CORONARY STENT PLACEMENT  2010   • ECHO - CONVERTED  08/2011   • OTHER SURGICAL HISTORY      Cath Stent Placement 2005 and 2010.   • PROSTATECTOMY      Radical     Family History   Problem Relation Age of Onset   • Coronary artery disease Other    • Cancer Other         Colon   • Alzheimer's disease Mother    • Cancer Sister    • No Known Problems Brother    • Heart disease Sister      Past Medical History:   Diagnosis Date   • Anxiety    • CAD (coronary artery disease)    • Caffeine use     2 Cups Daily.    • Depression    • Diabetes mellitus (HCC)    • Hyperlipidemia    • Hypertension    • Myocardial infarction (HCC)    • Obesity    • Prostate cancer  "([facility])      Patient Active Problem List   Diagnosis   • CAD (coronary artery disease) status post myocardial infarction and PCI to the RCA in    • Diabetes mellitus ([facility])   • Mixed hyperlipidemia   • Depression   • Essential hypertension   • Obesity   • Prostate CA ([facility])   • Memory loss   • Environmental allergies   • Vitamin D deficiency   • Shortness of breath   • Simple chronic bronchitis ([facility])   • Multiple pulmonary nodules       Social History     Tobacco Use   • Smoking status: Former Smoker     Quit date: 1991     Years since quittin.5   • Smokeless tobacco: Never Used   Vaping Use   • Vaping Use: Never used   Substance Use Topics   • Alcohol use: No   • Drug use: No       Allergies   Allergen Reactions   • Penicillins Other (See Comments)     Yrs ago went \"out of it\"     • Sulfa Antibiotics Hives       Current Outpatient Medications on File Prior to Visit   Medication Sig   • albuterol sulfate  (90 Base) MCG/ACT inhaler Inhale 2 puffs Every 4 (Four) Hours As Needed for Wheezing.   • aspirin 325 MG tablet Take 325 mg by mouth daily.   • Blood Glucose Monitoring Suppl (Blood Glucose Monitor System) w/Device kit 1 each Daily.   • cholecalciferol (VITAMIN D3) 1000 UNITS tablet Take 1,000 Units by mouth daily.   • donepezil (ARICEPT) 10 MG tablet TAKE ONE TABLET BY MOUTH ONCE NIGHTLY   • Farxiga 10 MG tablet TAKE ONE TABLET BY MOUTH DAILY   • glucose blood test strip 1 each by Other route As Needed (prn). Use as instructed   • imipramine (TOFRANIL) 50 MG tablet Take 50 mg by mouth Every Night.   • Lancets misc 1 stick Daily.   • linaclotide (LINZESS) 72 MCG capsule capsule Take 1 capsule by mouth Every Morning Before Breakfast.   • lisinopril (PRINIVIL,ZESTRIL) 10 MG tablet TAKE ONE TABLET BY MOUTH TWICE A DAY   • memantine (NAMENDA) 10 MG tablet TAKE ONE TABLET BY MOUTH TWICE A DAY   • metFORMIN ER (GLUCOPHAGE-XR) 500 MG 24 hr tablet TAKE ONE TABLET BY MOUTH DAILY WITH BREAKFAST   • " "montelukast (SINGULAIR) 10 MG tablet TAKE ONE TABLET BY MOUTH EVERY NIGHT AT BEDTIME   • nitroglycerin (NITROSTAT) 0.4 MG SL tablet 1 under the tongue as needed for angina, may repeat q5mins for up three doses   • Omeprazole Magnesium 10 MG pack Take 20 mg by mouth At Night As Needed (as needed for heartburn).   • rosuvastatin (CRESTOR) 20 MG tablet TAKE ONE TABLET BY MOUTH DAILY   • tiotropium bromide monohydrate (Spiriva Respimat) 2.5 MCG/ACT aerosol solution inhaler Inhale 2 puffs Daily.   • vitamin B-12 (CYANOCOBALAMIN) 1000 MCG tablet Take 1,000 mcg by mouth daily.     No current facility-administered medications on file prior to visit.         The following portions of the patient's history were reviewed and updated as appropriate: allergies, current medications, past family history, past medical history, past social history, past surgical history and problem list.    Review of Systems   Constitutional: Negative.   HENT: Negative.  Negative for congestion.    Eyes: Negative.    Cardiovascular: Negative.  Negative for chest pain, cyanosis, dyspnea on exertion, irregular heartbeat, leg swelling, near-syncope, orthopnea, palpitations, paroxysmal nocturnal dyspnea and syncope.   Respiratory: Negative.  Negative for shortness of breath.    Hematologic/Lymphatic: Negative.    Musculoskeletal: Negative.    Gastrointestinal: Negative.    Neurological: Negative.  Negative for headaches.   All other systems reviewed and are negative.         Objective:     /72 (BP Location: Left arm, Patient Position: Sitting, Cuff Size: Adult)   Pulse 79   Temp 97.7 °F (36.5 °C)   Ht 180.3 cm (71\")   Wt 99.3 kg (219 lb)   SpO2 98%   BMI 30.54 kg/m²   Pulmonary:      Effort: Pulmonary effort is normal.      Breath sounds: Normal breath sounds. No stridor. No wheezing. No rhonchi. No rales.   Cardiovascular:      PMI at left midclavicular line. Normal rate. Regular rhythm. Normal S1. Normal S2.      Murmurs: There is no " murmur.      No gallop. No click. No rub.   Pulses:     Intact distal pulses.   Edema:     Peripheral edema absent.           Lab Review          Procedures       I personally viewed and interpreted the patient's LAB data         Assessment:     1. Coronary artery disease involving native coronary artery of native heart without angina pectoris    2. Mixed hyperlipidemia    3. Essential hypertension    4. Type 2 diabetes mellitus without complication, without long-term current use of insulin ([facility])    5. Memory loss    6. Prostate CA ([facility])          Plan:     Patient has known coronary artery disease status post myocardial infarction and PCI to the RCA.  Currently is doing very well he denies any chest pain palpitations or shortness of breath.  He will continue antiplatelet therapy and statin therapy.  Patient could not tolerate beta-blocker therapy because of pulmonary issues.    Hyperlipidemia  Cholesterol is normal triglyceride is elevated 240 weight loss and dietary restriction better sugar control was discussed.  He will continue Crestor.    Hypertension is very well controlled with current medications he will continue lisinopril 10 mg twice daily.    Diabetic control is suboptimal, blood sugar is 153 with hemoglobin A1c 6.7  Diet diet restrictions and healthy lifestyle emphasized patient will continue Metformin and farxiga.  Recently Namenda were continued for mild memory loss.  Patient has prostate CA but has been doing very well with PSA normal  Follow-up scheduled        No follow-ups on file.

## 2021-12-21 RX ORDER — MEMANTINE HYDROCHLORIDE 10 MG/1
TABLET ORAL
Qty: 180 TABLET | Refills: 0 | Status: SHIPPED | OUTPATIENT
Start: 2021-12-21 | End: 2022-03-22

## 2021-12-21 RX ORDER — LISINOPRIL 10 MG/1
TABLET ORAL
Qty: 180 TABLET | Refills: 0 | Status: SHIPPED | OUTPATIENT
Start: 2021-12-21 | End: 2022-03-22

## 2021-12-28 RX ORDER — METFORMIN HYDROCHLORIDE 500 MG/1
TABLET, EXTENDED RELEASE ORAL
Qty: 90 TABLET | Refills: 0 | Status: SHIPPED | OUTPATIENT
Start: 2021-12-28 | End: 2022-01-25 | Stop reason: SDUPTHER

## 2022-01-25 RX ORDER — METFORMIN HYDROCHLORIDE 500 MG/1
500 TABLET, EXTENDED RELEASE ORAL
Qty: 90 TABLET | Refills: 0 | Status: SHIPPED | OUTPATIENT
Start: 2022-01-25 | End: 2022-08-01

## 2022-02-22 RX ORDER — ROSUVASTATIN CALCIUM 20 MG/1
TABLET, COATED ORAL
Qty: 90 TABLET | Refills: 1 | Status: SHIPPED | OUTPATIENT
Start: 2022-02-22 | End: 2022-08-18 | Stop reason: SDUPTHER

## 2022-02-22 RX ORDER — DONEPEZIL HYDROCHLORIDE 10 MG/1
TABLET, FILM COATED ORAL
Qty: 90 TABLET | Refills: 0 | Status: SHIPPED | OUTPATIENT
Start: 2022-02-22 | End: 2022-05-31

## 2022-03-01 ENCOUNTER — OFFICE VISIT (OUTPATIENT)
Dept: CARDIOLOGY | Facility: CLINIC | Age: 77
End: 2022-03-01

## 2022-03-01 VITALS
HEART RATE: 66 BPM | OXYGEN SATURATION: 95 % | HEIGHT: 71 IN | BODY MASS INDEX: 30.1 KG/M2 | WEIGHT: 215 LBS | DIASTOLIC BLOOD PRESSURE: 72 MMHG | SYSTOLIC BLOOD PRESSURE: 116 MMHG | TEMPERATURE: 97.1 F

## 2022-03-01 DIAGNOSIS — I10 ESSENTIAL HYPERTENSION: ICD-10-CM

## 2022-03-01 DIAGNOSIS — E11.9 TYPE 2 DIABETES MELLITUS WITHOUT COMPLICATION, WITHOUT LONG-TERM CURRENT USE OF INSULIN: ICD-10-CM

## 2022-03-01 DIAGNOSIS — I25.10 CORONARY ARTERY DISEASE INVOLVING NATIVE CORONARY ARTERY OF NATIVE HEART WITHOUT ANGINA PECTORIS: Primary | ICD-10-CM

## 2022-03-01 DIAGNOSIS — E78.2 MIXED HYPERLIPIDEMIA: ICD-10-CM

## 2022-03-01 DIAGNOSIS — E55.9 VITAMIN D DEFICIENCY: ICD-10-CM

## 2022-03-01 PROCEDURE — 99214 OFFICE O/P EST MOD 30 MIN: CPT | Performed by: INTERNAL MEDICINE

## 2022-03-01 NOTE — PROGRESS NOTES
subjective     Chief Complaint   Patient presents with   • Coronary Artery Disease     follow up     History of Present Illness  Patient is 76 years old white male who has known coronary artery disease, status post myocardial infarction and PCI to RCA in 2005.  He is doing well denies any chest pain palpitations or shortness of breath.  He is taking antiplatelet therapy with aspirin, statin therapy with Crestor    Blood pressure is very well controlled with lisinopril    Patient also is diabetic and is taking Metformin along with farxiga  Blood sugar this morning was around 130.  Patient has COPD and is following with pulmonology on multiple inhalers and is doing very well.  History of mild memory loss.  Taking Namenda and Aricept.    History of prostate CA is doing very well.        Past Surgical History:   Procedure Laterality Date   • CARDIAC CATHETERIZATION  08/2010   • CARDIOVASCULAR STRESS TEST  11/2014   • CARDIOVASCULAR STRESS TEST  11/2014   • COLONOSCOPY  09/01/2014   • COLONOSCOPY  09/2014   • CORONARY STENT PLACEMENT  2005   • CORONARY STENT PLACEMENT  2010   • ECHO - CONVERTED  08/2011   • OTHER SURGICAL HISTORY      Cath Stent Placement 2005 and 2010.   • PROSTATECTOMY      Radical     Family History   Problem Relation Age of Onset   • Coronary artery disease Other    • Cancer Other         Colon   • Alzheimer's disease Mother    • Cancer Sister    • No Known Problems Brother    • Heart disease Sister      Past Medical History:   Diagnosis Date   • Anxiety    • CAD (coronary artery disease)    • Caffeine use     2 Cups Daily.    • Depression    • Diabetes mellitus (HCC)    • Hyperlipidemia    • Hypertension    • Myocardial infarction (HCC)    • Obesity    • Prostate cancer (HCC)      Patient Active Problem List   Diagnosis   • CAD (coronary artery disease) status post myocardial infarction and PCI to the RCA in 2005   • Diabetes mellitus (HCC)   • Mixed hyperlipidemia   • Depression   • Essential  "hypertension   • Obesity   • Prostate CA (HCC)   • Memory loss   • Environmental allergies   • Vitamin D deficiency   • Shortness of breath   • Simple chronic bronchitis (HCC)   • Multiple pulmonary nodules       Social History     Tobacco Use   • Smoking status: Former Smoker     Quit date: 1991     Years since quittin.8   • Smokeless tobacco: Never Used   Vaping Use   • Vaping Use: Never used   Substance Use Topics   • Alcohol use: No   • Drug use: No       Allergies   Allergen Reactions   • Penicillins Other (See Comments)     Yrs ago went \"out of it\"     • Sulfa Antibiotics Hives       Current Outpatient Medications on File Prior to Visit   Medication Sig   • albuterol sulfate  (90 Base) MCG/ACT inhaler Inhale 2 puffs Every 4 (Four) Hours As Needed for Wheezing.   • aspirin 325 MG tablet Take 325 mg by mouth daily.   • Blood Glucose Monitoring Suppl (Blood Glucose Monitor System) w/Device kit 1 each Daily.   • cholecalciferol (VITAMIN D3) 1000 UNITS tablet Take 1,000 Units by mouth daily.   • donepezil (ARICEPT) 10 MG tablet TAKE ONE TABLET BY MOUTH ONCE NIGHTLY   • Farxiga 10 MG tablet TAKE ONE TABLET BY MOUTH DAILY   • glucose blood test strip 1 each by Other route As Needed (prn). Use as instructed   • imipramine (TOFRANIL) 50 MG tablet Take 50 mg by mouth Every Night.   • Lancets misc 1 stick Daily.   • linaclotide (LINZESS) 72 MCG capsule capsule Take 1 capsule by mouth Every Morning Before Breakfast.   • lisinopril (PRINIVIL,ZESTRIL) 10 MG tablet TAKE ONE TABLET BY MOUTH TWICE A DAY   • memantine (NAMENDA) 10 MG tablet TAKE ONE TABLET BY MOUTH TWICE A DAY   • metFORMIN ER (GLUCOPHAGE-XR) 500 MG 24 hr tablet Take 1 tablet by mouth Daily With Breakfast.   • montelukast (SINGULAIR) 10 MG tablet TAKE ONE TABLET BY MOUTH EVERY NIGHT AT BEDTIME   • nitroglycerin (NITROSTAT) 0.4 MG SL tablet 1 under the tongue as needed for angina, may repeat q5mins for up three doses   • Omeprazole Magnesium 10 " "MG pack Take 20 mg by mouth At Night As Needed (as needed for heartburn).   • rosuvastatin (CRESTOR) 20 MG tablet TAKE ONE TABLET BY MOUTH DAILY   • tiotropium bromide monohydrate (Spiriva Respimat) 2.5 MCG/ACT aerosol solution inhaler Inhale 2 puffs Daily.   • vitamin B-12 (CYANOCOBALAMIN) 1000 MCG tablet Take 1,000 mcg by mouth daily.     No current facility-administered medications on file prior to visit.         The following portions of the patient's history were reviewed and updated as appropriate: allergies, current medications, past family history, past medical history, past social history, past surgical history and problem list.    Review of Systems   Constitutional: Negative.   HENT: Negative.  Negative for congestion.    Eyes: Negative.    Cardiovascular: Negative.  Negative for chest pain, cyanosis, dyspnea on exertion, irregular heartbeat, leg swelling, near-syncope, orthopnea, palpitations, paroxysmal nocturnal dyspnea and syncope.   Respiratory: Negative.  Negative for shortness of breath.    Hematologic/Lymphatic: Negative.    Musculoskeletal: Negative.    Gastrointestinal: Negative.    Neurological: Negative.  Negative for headaches.          Objective:     /72   Pulse 66   Temp 97.1 °F (36.2 °C)   Ht 180.3 cm (71\")   Wt 97.5 kg (215 lb)   SpO2 95%   BMI 29.99 kg/m²   Pulmonary:      Effort: Pulmonary effort is normal.      Breath sounds: Normal breath sounds. No stridor. No wheezing. No rhonchi. No rales.   Cardiovascular:      PMI at left midclavicular line. Normal rate. Regular rhythm. Normal S1. Normal S2.      Murmurs: There is no murmur.      No gallop. No click. No rub.   Pulses:     Intact distal pulses.   Edema:     Peripheral edema absent.           Lab Review  CBC, CMP, lipid panel, hemoglobin A1c, urine for microalbumin scheduled for next visit.      Procedures       I personally viewed and interpreted the patient's LAB data         Assessment:     1. Coronary artery disease " involving native coronary artery of native heart without angina pectoris    2. Essential hypertension    3. Mixed hyperlipidemia    4. Type 2 diabetes mellitus without complication, without long-term current use of insulin (HCC)    5. Vitamin D deficiency          Plan:     Coronary artery disease status post PCI to RCA  Patient is asymptomatic he will continue current medications.  Weight loss and healthy lifestyle emphasized.    Hypertension very well controlled with current medications he will current  Medications, he will continue lisinopril    Hyperlipidemia on Crestor which will be continued.  No drug side effects.  Lab work scheduled.  Patient is diabetic blood sugar around 130 this morning he will continue Glucophage and farxiga, weight loss and diet again discussed.  Vitamin D level will be checked.  Follow-up scheduled        No follow-ups on file.

## 2022-03-11 ENCOUNTER — OFFICE VISIT (OUTPATIENT)
Dept: PULMONOLOGY | Facility: CLINIC | Age: 77
End: 2022-03-11

## 2022-03-11 VITALS
DIASTOLIC BLOOD PRESSURE: 72 MMHG | HEART RATE: 70 BPM | TEMPERATURE: 97.3 F | SYSTOLIC BLOOD PRESSURE: 128 MMHG | OXYGEN SATURATION: 98 % | HEIGHT: 71 IN | BODY MASS INDEX: 29.82 KG/M2 | WEIGHT: 213 LBS

## 2022-03-11 DIAGNOSIS — R91.8 MULTIPLE PULMONARY NODULES: ICD-10-CM

## 2022-03-11 DIAGNOSIS — C61 PROSTATE CA: ICD-10-CM

## 2022-03-11 DIAGNOSIS — J41.0 SIMPLE CHRONIC BRONCHITIS: Primary | ICD-10-CM

## 2022-03-11 PROCEDURE — 99214 OFFICE O/P EST MOD 30 MIN: CPT | Performed by: PHYSICIAN ASSISTANT

## 2022-03-11 NOTE — PROGRESS NOTES
"Chief Complaint  Pulmonary nodule/COPD      Subjective          Rose Singer presents to [facility]IT[facility]ARE MEDICINE  History of Present Illness     Patient presents today for follow-up of COPD likely chronic bronchitis type and pulmonary nodules in the setting of prostate cancer.  Since previous visit, he completed PET scan with [facility].  He states that his PSA levels have been elevated but they have not yet found any new growths.  He states that he is doing very well on the Stiolto inhaler.  He is noticing less cough, less shortness of breath, and wishes to continue therapy at this time.        Objective   Vital Signs:   /72   Pulse 70   Temp 97.3 °F (36.3 °C)   Ht 180.3 cm (71\")   Wt 96.6 kg (213 lb)   SpO2 98%   BMI 29.71 kg/m²     Physical Exam  Vitals reviewed.   Constitutional:       General: He is not in acute distress.     Appearance: He is well-developed. He is not diaphoretic.   HENT:      Head: Normocephalic and atraumatic.   Neck:      Thyroid: No thyromegaly.   Cardiovascular:      Rate and Rhythm: Normal rate and regular rhythm.      Heart sounds: Normal heart sounds, S1 normal and S2 normal.   Pulmonary:      Effort: Pulmonary effort is normal.      Breath sounds: No wheezing, rhonchi or rales.   Neurological:      Mental Status: He is alert and oriented to person, place, and time.   Psychiatric:         Behavior: Behavior normal.        Result Review :   The following data was reviewed by: Isabel Contreras PA-C on 03/11/2022:    Reviewed previous CT chest from March 2021. (Groundglass nodule noted measuring approximately 7.9 mm with other calcified nodules noted.)    Reviewed previous CT chest from August 2020. (Groundglass nodule improved to 5.2 mm with other calcified nodule stable)      Reviewed the PFT from 2020.  Reviewed the echo from August 2020.    Assessment and Plan    Diagnoses and all orders for this visit:    1. Simple chronic bronchitis (HCC) " (Primary)  -     tiotropium bromide-olodaterol (STIOLTO RESPIMAT) 2.5-2.5 MCG/ACT aerosol solution inhaler; Inhale 2 puffs Daily.  Dispense: 1 each; Refill: 8    2. Prostate CA (HCC)    3. Multiple pulmonary nodules      COPD, chronic bronchitis type:  · Continue albuterol inhaler as needed  · Continue Stiolto inhaler 2 puffs daily   this was more beneficial than Spiriva Respimat.      Pulmonary nodules, history of prostate cancer:  Initial CT scan in 2020 notable for single noncalcified nodule measuring 7.9 mm (report type noted of 3.9 mm).   Repeat CT chest in 2021 notable for decrease in size to approximately 5.2 mm.  Other calcified nodules have been stable between the 2 scans.  · We will obtain recent PET CT scan from [facility].    He reports that this was negative for any areas of hypermetabolic activity. They have been concerned of his elevated PSA level without any notable gross findings.    If no changes of the noncalcified nodule or no new calcified nodule stability, we will plan for repeat CT chest in 1 year for nodule follow-up.          Follow Up   Return in about 3 months (around 6/11/2022), or if symptoms worsen or fail to improve, for Next scheduled follow up.  Patient was given instructions and counseling regarding his condition or for health maintenance advice. Please see specific information pulled into the AVS if appropriate.

## 2022-03-22 RX ORDER — LISINOPRIL 10 MG/1
TABLET ORAL
Qty: 180 TABLET | Refills: 0 | Status: SHIPPED | OUTPATIENT
Start: 2022-03-22 | End: 2022-06-21

## 2022-03-22 RX ORDER — MEMANTINE HYDROCHLORIDE 10 MG/1
TABLET ORAL
Qty: 180 TABLET | Refills: 0 | Status: SHIPPED | OUTPATIENT
Start: 2022-03-22 | End: 2022-06-21

## 2022-04-18 ENCOUNTER — TELEPHONE (OUTPATIENT)
Dept: CARDIOLOGY | Facility: CLINIC | Age: 77
End: 2022-04-18

## 2022-05-17 ENCOUNTER — LAB (OUTPATIENT)
Dept: LAB | Facility: HOSPITAL | Age: 77
End: 2022-05-17

## 2022-05-17 DIAGNOSIS — E11.9 TYPE 2 DIABETES MELLITUS WITHOUT COMPLICATION, WITHOUT LONG-TERM CURRENT USE OF INSULIN: ICD-10-CM

## 2022-05-17 DIAGNOSIS — E78.2 MIXED HYPERLIPIDEMIA: ICD-10-CM

## 2022-05-17 DIAGNOSIS — E55.9 VITAMIN D DEFICIENCY: ICD-10-CM

## 2022-05-17 LAB
25(OH)D3 SERPL-MCNC: 76.9 NG/ML (ref 30–100)
ALBUMIN SERPL-MCNC: 4.4 G/DL (ref 3.5–5.2)
ALBUMIN UR-MCNC: <1.2 MG/DL
ALBUMIN/GLOB SERPL: 2.1 G/DL
ALP SERPL-CCNC: 47 U/L (ref 39–117)
ALT SERPL W P-5'-P-CCNC: 22 U/L (ref 1–41)
ANION GAP SERPL CALCULATED.3IONS-SCNC: 13.2 MMOL/L (ref 5–15)
AST SERPL-CCNC: 20 U/L (ref 1–40)
BASOPHILS # BLD AUTO: 0.03 10*3/MM3 (ref 0–0.2)
BASOPHILS NFR BLD AUTO: 0.5 % (ref 0–1.5)
BILIRUB SERPL-MCNC: 0.5 MG/DL (ref 0–1.2)
BUN SERPL-MCNC: 15 MG/DL (ref 8–23)
BUN/CREAT SERPL: 12.7 (ref 7–25)
CALCIUM SPEC-SCNC: 10.1 MG/DL (ref 8.6–10.5)
CHLORIDE SERPL-SCNC: 105 MMOL/L (ref 98–107)
CHOLEST SERPL-MCNC: 115 MG/DL (ref 0–200)
CK SERPL-CCNC: 79 U/L (ref 20–200)
CO2 SERPL-SCNC: 22.8 MMOL/L (ref 22–29)
CREAT SERPL-MCNC: 1.18 MG/DL (ref 0.76–1.27)
DEPRECATED RDW RBC AUTO: 41 FL (ref 37–54)
EGFRCR SERPLBLD CKD-EPI 2021: 64.8 ML/MIN/1.73
EOSINOPHIL # BLD AUTO: 0.12 10*3/MM3 (ref 0–0.4)
EOSINOPHIL NFR BLD AUTO: 2.1 % (ref 0.3–6.2)
ERYTHROCYTE [DISTWIDTH] IN BLOOD BY AUTOMATED COUNT: 12.7 % (ref 12.3–15.4)
GLOBULIN UR ELPH-MCNC: 2.1 GM/DL
GLUCOSE SERPL-MCNC: 141 MG/DL (ref 65–99)
HBA1C MFR BLD: 6.4 % (ref 4.8–5.6)
HCT VFR BLD AUTO: 47.1 % (ref 37.5–51)
HDLC SERPL-MCNC: 32 MG/DL (ref 40–60)
HGB BLD-MCNC: 16.3 G/DL (ref 13–17.7)
IMM GRANULOCYTES # BLD AUTO: 0.02 10*3/MM3 (ref 0–0.05)
IMM GRANULOCYTES NFR BLD AUTO: 0.3 % (ref 0–0.5)
LDLC SERPL CALC-MCNC: 58 MG/DL (ref 0–100)
LDLC/HDLC SERPL: 1.73 {RATIO}
LYMPHOCYTES # BLD AUTO: 1.1 10*3/MM3 (ref 0.7–3.1)
LYMPHOCYTES NFR BLD AUTO: 19.2 % (ref 19.6–45.3)
MCH RBC QN AUTO: 30.8 PG (ref 26.6–33)
MCHC RBC AUTO-ENTMCNC: 34.6 G/DL (ref 31.5–35.7)
MCV RBC AUTO: 88.9 FL (ref 79–97)
MONOCYTES # BLD AUTO: 0.52 10*3/MM3 (ref 0.1–0.9)
MONOCYTES NFR BLD AUTO: 9.1 % (ref 5–12)
NEUTROPHILS NFR BLD AUTO: 3.93 10*3/MM3 (ref 1.7–7)
NEUTROPHILS NFR BLD AUTO: 68.8 % (ref 42.7–76)
NRBC BLD AUTO-RTO: 0 /100 WBC (ref 0–0.2)
PLATELET # BLD AUTO: 168 10*3/MM3 (ref 140–450)
PMV BLD AUTO: 10.7 FL (ref 6–12)
POTASSIUM SERPL-SCNC: 4.7 MMOL/L (ref 3.5–5.2)
PROT SERPL-MCNC: 6.5 G/DL (ref 6–8.5)
RBC # BLD AUTO: 5.3 10*6/MM3 (ref 4.14–5.8)
SODIUM SERPL-SCNC: 141 MMOL/L (ref 136–145)
TRIGL SERPL-MCNC: 139 MG/DL (ref 0–150)
VLDLC SERPL-MCNC: 25 MG/DL (ref 5–40)
WBC NRBC COR # BLD: 5.72 10*3/MM3 (ref 3.4–10.8)

## 2022-05-17 PROCEDURE — 80061 LIPID PANEL: CPT

## 2022-05-17 PROCEDURE — 82306 VITAMIN D 25 HYDROXY: CPT

## 2022-05-17 PROCEDURE — 82550 ASSAY OF CK (CPK): CPT

## 2022-05-17 PROCEDURE — 83036 HEMOGLOBIN GLYCOSYLATED A1C: CPT

## 2022-05-17 PROCEDURE — 36415 COLL VENOUS BLD VENIPUNCTURE: CPT

## 2022-05-17 PROCEDURE — 82043 UR ALBUMIN QUANTITATIVE: CPT

## 2022-05-17 PROCEDURE — 80053 COMPREHEN METABOLIC PANEL: CPT

## 2022-05-17 PROCEDURE — 85025 COMPLETE CBC W/AUTO DIFF WBC: CPT

## 2022-05-25 ENCOUNTER — OFFICE VISIT (OUTPATIENT)
Dept: CARDIOLOGY | Facility: CLINIC | Age: 77
End: 2022-05-25

## 2022-05-25 VITALS
HEART RATE: 59 BPM | DIASTOLIC BLOOD PRESSURE: 68 MMHG | BODY MASS INDEX: 29.54 KG/M2 | HEIGHT: 71 IN | OXYGEN SATURATION: 99 % | TEMPERATURE: 97.1 F | WEIGHT: 211 LBS | SYSTOLIC BLOOD PRESSURE: 120 MMHG

## 2022-05-25 DIAGNOSIS — I10 ESSENTIAL HYPERTENSION: ICD-10-CM

## 2022-05-25 DIAGNOSIS — I25.10 CORONARY ARTERY DISEASE INVOLVING NATIVE CORONARY ARTERY OF NATIVE HEART WITHOUT ANGINA PECTORIS: Primary | ICD-10-CM

## 2022-05-25 DIAGNOSIS — E11.9 TYPE 2 DIABETES MELLITUS WITHOUT COMPLICATION, WITHOUT LONG-TERM CURRENT USE OF INSULIN: ICD-10-CM

## 2022-05-25 DIAGNOSIS — E78.2 MIXED HYPERLIPIDEMIA: ICD-10-CM

## 2022-05-25 DIAGNOSIS — R41.3 MEMORY LOSS: ICD-10-CM

## 2022-05-25 PROCEDURE — 99214 OFFICE O/P EST MOD 30 MIN: CPT | Performed by: INTERNAL MEDICINE

## 2022-05-25 RX ORDER — DAPAGLIFLOZIN 10 MG/1
1 TABLET, FILM COATED ORAL DAILY
Qty: 90 TABLET | Refills: 1 | Status: SHIPPED | OUTPATIENT
Start: 2022-05-25

## 2022-05-25 NOTE — PROGRESS NOTES
subjective     Chief Complaint   Patient presents with   • Coronary Artery Disease     Follow up   • Med Refill     pending     History of Present Illness  Patient is 76 years old white male who is here for follow-up.  He states that he has been doing very well he has history of coronary artery disease with myocardial infarction with PCI to RCA in 2005 he denies any chest pain palpitations or shortness of breath.  He is taking antiplatelet therapy with aspirin, statin therapy with Crestor but no beta-blocker therapy because of COPD and bradycardia.  He is breathing better he is currently taking albuterol and Stiolto Respimat.  He is following with pulmonology.    Is also diabetic and has been taking metformin.  Blood sugars running slightly high.  Hyperlipidemia on Crestor therapy.  Overall patient is doing very well and is here for follow-up denies any chest pain palpitations or shortness of breath.    Past Surgical History:   Procedure Laterality Date   • CARDIAC CATHETERIZATION  08/2010   • CARDIOVASCULAR STRESS TEST  11/2014   • CARDIOVASCULAR STRESS TEST  11/2014   • COLONOSCOPY  09/01/2014   • COLONOSCOPY  09/2014   • CORONARY STENT PLACEMENT  2005   • CORONARY STENT PLACEMENT  2010   • ECHO - CONVERTED  08/2011   • OTHER SURGICAL HISTORY      Cath Stent Placement 2005 and 2010.   • PROSTATECTOMY      Radical     Family History   Problem Relation Age of Onset   • Coronary artery disease Other    • Cancer Other         Colon   • Alzheimer's disease Mother    • Cancer Sister    • No Known Problems Brother    • Heart disease Sister      Past Medical History:   Diagnosis Date   • Anxiety    • CAD (coronary artery disease)    • Caffeine use     2 Cups Daily.    • Depression    • Diabetes mellitus (HCC)    • Hyperlipidemia    • Hypertension    • Myocardial infarction (HCC)    • Obesity    • Prostate cancer (HCC)      Patient Active Problem List   Diagnosis   • CAD (coronary artery disease) status post myocardial  "infarction and PCI to the RCA in    • Diabetes mellitus (HCC)   • Mixed hyperlipidemia   • Depression   • Essential hypertension   • Obesity   • Prostate CA (HCC)   • Memory loss   • Environmental allergies   • Vitamin D deficiency   • Shortness of breath   • Simple chronic bronchitis (HCC)   • Multiple pulmonary nodules       Social History     Tobacco Use   • Smoking status: Former Smoker     Quit date: 1991     Years since quittin.0   • Smokeless tobacco: Never Used   Vaping Use   • Vaping Use: Never used   Substance Use Topics   • Alcohol use: No   • Drug use: No       Allergies   Allergen Reactions   • Penicillins Other (See Comments)     Yrs ago went \"out of it\"     • Sulfa Antibiotics Hives       Current Outpatient Medications on File Prior to Visit   Medication Sig   • albuterol sulfate  (90 Base) MCG/ACT inhaler Inhale 2 puffs Every 4 (Four) Hours As Needed for Wheezing.   • aspirin 325 MG tablet Take 325 mg by mouth daily.   • Blood Glucose Monitoring Suppl (Blood Glucose Monitor System) w/Device kit 1 each Daily.   • cholecalciferol (VITAMIN D3) 1000 UNITS tablet Take 1,000 Units by mouth daily.   • donepezil (ARICEPT) 10 MG tablet TAKE ONE TABLET BY MOUTH ONCE NIGHTLY   • glucose blood test strip 1 each by Other route As Needed (prn). Use as instructed   • imipramine (TOFRANIL) 50 MG tablet Take 50 mg by mouth Every Night.   • Lancets misc 1 stick Daily.   • linaclotide (LINZESS) 72 MCG capsule capsule Take 1 capsule by mouth Every Morning Before Breakfast.   • lisinopril (PRINIVIL,ZESTRIL) 10 MG tablet TAKE ONE TABLET BY MOUTH TWICE A DAY   • memantine (NAMENDA) 10 MG tablet TAKE ONE TABLET BY MOUTH TWICE A DAY   • metFORMIN ER (GLUCOPHAGE-XR) 500 MG 24 hr tablet Take 1 tablet by mouth Daily With Breakfast.   • montelukast (SINGULAIR) 10 MG tablet TAKE ONE TABLET BY MOUTH EVERY NIGHT AT BEDTIME   • nitroglycerin (NITROSTAT) 0.4 MG SL tablet 1 under the tongue as needed for angina, " "may repeat q5mins for up three doses   • Omeprazole Magnesium 10 MG pack Take 20 mg by mouth At Night As Needed (as needed for heartburn).   • rosuvastatin (CRESTOR) 20 MG tablet TAKE ONE TABLET BY MOUTH DAILY   • tiotropium bromide-olodaterol (STIOLTO RESPIMAT) 2.5-2.5 MCG/ACT aerosol solution inhaler Inhale 2 puffs Daily.   • vitamin B-12 (CYANOCOBALAMIN) 1000 MCG tablet Take 1,000 mcg by mouth daily.     No current facility-administered medications on file prior to visit.         The following portions of the patient's history were reviewed and updated as appropriate: allergies, current medications, past family history, past medical history, past social history, past surgical history and problem list.    Review of Systems   Constitutional: Negative.   HENT: Negative.  Negative for congestion.    Eyes: Negative.    Cardiovascular: Negative.  Negative for chest pain, cyanosis, dyspnea on exertion, irregular heartbeat, leg swelling, near-syncope, orthopnea, palpitations, paroxysmal nocturnal dyspnea and syncope.   Respiratory: Negative.  Negative for shortness of breath.    Hematologic/Lymphatic: Negative.    Musculoskeletal: Negative.    Gastrointestinal: Negative.    Neurological: Negative.  Negative for headaches.          Objective:     /68 (BP Location: Left arm, Patient Position: Sitting, Cuff Size: Adult)   Pulse 59   Temp 97.1 °F (36.2 °C)   Ht 180.3 cm (71\")   Wt 95.7 kg (211 lb)   SpO2 99%   BMI 29.43 kg/m²   Pulmonary:      Effort: Pulmonary effort is normal.      Breath sounds: Normal breath sounds. No stridor. No wheezing. No rhonchi. No rales.   Cardiovascular:      PMI at left midclavicular line. Normal rate. Regular rhythm. Normal S1. Normal S2.      Murmurs: There is no murmur.      No gallop. No click. No rub.   Pulses:     Intact distal pulses.   Edema:     Peripheral edema absent.           Lab Review  Lab Results   Component Value Date     05/17/2022    K 4.7 05/17/2022    CL " 105 05/17/2022    BUN 15 05/17/2022    CREATININE 1.18 05/17/2022    GLUCOSE 141 (H) 05/17/2022    CALCIUM 10.1 05/17/2022    ALT 22 05/17/2022    ALKPHOS 47 05/17/2022    LABIL2 1.6 02/10/2016     Lab Results   Component Value Date    CKTOTAL 79 05/17/2022     Lab Results   Component Value Date    WBC 5.72 05/17/2022    HGB 16.3 05/17/2022    HCT 47.1 05/17/2022     05/17/2022     No results found for: INR  No results found for: MG  Lab Results   Component Value Date    PSA 0.84 04/08/2022     No results found for: BNP  Lab Results   Component Value Date    CHLPL 143 02/10/2016    CHOL 115 05/17/2022    TRIG 139 05/17/2022    HDL 32 (L) 05/17/2022    VLDL 25 05/17/2022    LDLHDL 1.73 05/17/2022     Lab Results   Component Value Date    LDL 58 05/17/2022    LDL 91 01/18/2017       Procedures       I personally viewed and interpreted the patient's LAB data         Assessment:     1. Coronary artery disease involving native coronary artery of native heart without angina pectoris    2. Essential hypertension    3. Mixed hyperlipidemia    4. Type 2 diabetes mellitus without complication, without long-term current use of insulin (HCC)    5. Memory loss          Plan:   Hyperlipidemia  Lab work reviewed and discussed with the patient  Lipid panel is normal with LDL of 58 he will continue Crestor 20 mg daily.    Diabetes mellitus  Blood sugar is 141 with A1c 6.4  Patient was advised to continue metformin  mg daily.  Farxiga was added.    Coronary artery disease status post myocardial infarction and PCI currently asymptomatic healthy lifestyle emphasized he will continue antiplatelet therapy and statin therapy.    Blood pressure is also very well controlled.  Lisinopril was continued.    Mild memory loss Aricept and Namenda continued.  Follow-up scheduled healthy lifestyle emphasized        No follow-ups on file.

## 2022-05-31 RX ORDER — DONEPEZIL HYDROCHLORIDE 10 MG/1
TABLET, FILM COATED ORAL
Qty: 90 TABLET | Refills: 0 | Status: SHIPPED | OUTPATIENT
Start: 2022-05-31 | End: 2022-08-24

## 2022-06-13 ENCOUNTER — OFFICE VISIT (OUTPATIENT)
Dept: CARDIOLOGY | Facility: CLINIC | Age: 77
End: 2022-06-13

## 2022-06-13 VITALS
HEART RATE: 60 BPM | HEIGHT: 71 IN | TEMPERATURE: 97.1 F | BODY MASS INDEX: 29.68 KG/M2 | WEIGHT: 212 LBS | DIASTOLIC BLOOD PRESSURE: 66 MMHG | OXYGEN SATURATION: 99 % | SYSTOLIC BLOOD PRESSURE: 124 MMHG

## 2022-06-13 DIAGNOSIS — I10 ESSENTIAL HYPERTENSION: ICD-10-CM

## 2022-06-13 DIAGNOSIS — S70.362A TICK BITE OF LEFT THIGH, INITIAL ENCOUNTER: Primary | ICD-10-CM
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
## 2022-06-13 DIAGNOSIS — I25.10 CORONARY ARTERY DISEASE INVOLVING NATIVE CORONARY ARTERY OF NATIVE HEART WITHOUT ANGINA PECTORIS: ICD-10-CM

## 2022-06-13 PROCEDURE — 99214 OFFICE O/P EST MOD 30 MIN: CPT | Performed by: INTERNAL MEDICINE

## 2022-06-13 RX ORDER — DOXYCYCLINE HYCLATE 100 MG
100 TABLET ORAL 2 TIMES DAILY
Qty: 20 TABLET | Refills: 0 | Status: SHIPPED | OUTPATIENT
Start: 2022-06-13 | End: 2022-08-18

## 2022-06-13 NOTE — PROGRESS NOTES
subjective     Chief Complaint   Patient presents with   • Insect Bite     Left hip     History of Present Illness  Patient is 76 years old white male who has been fairly good health he presented to the office because of an area of cellulitis and tick bite in the left upper thigh and groin area.  Patient states that he found a black danny but he felt it was just a mole.  After 3 days black spot fell off.  Had intense itching and redness and inflammation around the area.  He denies any fever or chills.  No skin rash.    Past Surgical History:   Procedure Laterality Date   • CARDIAC CATHETERIZATION  08/2010   • CARDIOVASCULAR STRESS TEST  11/2014   • CARDIOVASCULAR STRESS TEST  11/2014   • COLONOSCOPY  09/01/2014   • COLONOSCOPY  09/2014   • CORONARY STENT PLACEMENT  2005   • CORONARY STENT PLACEMENT  2010   • ECHO - CONVERTED  08/2011   • OTHER SURGICAL HISTORY      Cath Stent Placement 2005 and 2010.   • PROSTATECTOMY      Radical     Family History   Problem Relation Age of Onset   • Coronary artery disease Other    • Cancer Other         Colon   • Alzheimer's disease Mother    • Cancer Sister    • No Known Problems Brother    • Heart disease Sister      Past Medical History:   Diagnosis Date   • Anxiety    • CAD (coronary artery disease)    • Caffeine use     2 Cups Daily.    • Depression    • Diabetes mellitus (HCC)    • Hyperlipidemia    • Hypertension    • Myocardial infarction (HCC)    • Obesity    • Prostate cancer (HCC)      Patient Active Problem List   Diagnosis   • CAD (coronary artery disease) status post myocardial infarction and PCI to the RCA in 2005   • Diabetes mellitus (HCC)   • Mixed hyperlipidemia   • Depression   • Essential hypertension   • Obesity   • Prostate CA (HCC)   • Memory loss   • Environmental allergies   • Vitamin D deficiency   • Shortness of breath   • Simple chronic bronchitis (HCC)   • Multiple pulmonary nodules   • Tick bite of left thigh       Social History     Tobacco Use   •  "Smoking status: Former Smoker     Quit date: 1991     Years since quittin.0   • Smokeless tobacco: Never Used   Vaping Use   • Vaping Use: Never used   Substance Use Topics   • Alcohol use: No   • Drug use: No       Allergies   Allergen Reactions   • Penicillins Other (See Comments)     Yrs ago went \"out of it\"     • Sulfa Antibiotics Hives       Current Outpatient Medications on File Prior to Visit   Medication Sig   • albuterol sulfate  (90 Base) MCG/ACT inhaler Inhale 2 puffs Every 4 (Four) Hours As Needed for Wheezing.   • aspirin 325 MG tablet Take 325 mg by mouth daily.   • Blood Glucose Monitoring Suppl (Blood Glucose Monitor System) w/Device kit 1 each Daily.   • cholecalciferol (VITAMIN D3) 1000 UNITS tablet Take 1,000 Units by mouth daily.   • dapagliflozin Propanediol (Farxiga) 10 MG tablet Take 10 mg by mouth Daily.   • donepezil (ARICEPT) 10 MG tablet TAKE ONE TABLET BY MOUTH ONCE NIGHTLY   • glucose blood test strip 1 each by Other route As Needed (prn). Use as instructed   • imipramine (TOFRANIL) 50 MG tablet Take 50 mg by mouth Every Night.   • Lancets misc 1 stick Daily.   • linaclotide (LINZESS) 72 MCG capsule capsule Take 1 capsule by mouth Every Morning Before Breakfast.   • lisinopril (PRINIVIL,ZESTRIL) 10 MG tablet TAKE ONE TABLET BY MOUTH TWICE A DAY   • memantine (NAMENDA) 10 MG tablet TAKE ONE TABLET BY MOUTH TWICE A DAY   • metFORMIN ER (GLUCOPHAGE-XR) 500 MG 24 hr tablet Take 1 tablet by mouth Daily With Breakfast.   • montelukast (SINGULAIR) 10 MG tablet TAKE ONE TABLET BY MOUTH EVERY NIGHT AT BEDTIME   • nitroglycerin (NITROSTAT) 0.4 MG SL tablet 1 under the tongue as needed for angina, may repeat q5mins for up three doses   • Omeprazole Magnesium 10 MG pack Take 20 mg by mouth At Night As Needed (as needed for heartburn).   • rosuvastatin (CRESTOR) 20 MG tablet TAKE ONE TABLET BY MOUTH DAILY   • tiotropium bromide-olodaterol (STIOLTO RESPIMAT) 2.5-2.5 MCG/ACT aerosol " "solution inhaler Inhale 2 puffs Daily.   • vitamin B-12 (CYANOCOBALAMIN) 1000 MCG tablet Take 1,000 mcg by mouth daily.     No current facility-administered medications on file prior to visit.         The following portions of the patient's history were reviewed and updated as appropriate: allergies, current medications, past family history, past medical history, past social history, past surgical history and problem list.    Review of Systems   Constitutional: Negative.   HENT: Negative.  Negative for congestion.    Eyes: Negative.    Cardiovascular: Negative.  Negative for chest pain, cyanosis, dyspnea on exertion, irregular heartbeat, leg swelling, near-syncope, orthopnea, palpitations, paroxysmal nocturnal dyspnea and syncope.   Respiratory: Negative.  Negative for shortness of breath.    Hematologic/Lymphatic: Negative.    Skin: Positive for itching.        Tick bite an area of irritation and redness   Musculoskeletal: Negative.    Gastrointestinal: Negative.    Neurological: Negative.  Negative for headaches.          Objective:     /66 (BP Location: Left arm, Patient Position: Sitting, Cuff Size: Adult)   Pulse 60   Temp 97.1 °F (36.2 °C)   Ht 180.3 cm (71\")   Wt 96.2 kg (212 lb)   SpO2 99%   BMI 29.57 kg/m²   Pulmonary:      Effort: Pulmonary effort is normal.      Breath sounds: Normal breath sounds. No stridor. No wheezing. No rhonchi. No rales.   Cardiovascular:      PMI at left midclavicular line. Normal rate. Regular rhythm. Normal S1. Normal S2.      Murmurs: There is no murmur.      No gallop. No click. No rub.   Pulses:     Intact distal pulses.   Edema:     Peripheral edema absent.   Skin:     Comments: Small area of insect bite left upper thigh with surrounding inflammation and redness around 2 inches in diameter.  No lymphadenopathy           Lab Review  Lab Results   Component Value Date     05/17/2022    K 4.7 05/17/2022     05/17/2022    BUN 15 05/17/2022    CREATININE " 1.18 05/17/2022    GLUCOSE 141 (H) 05/17/2022    CALCIUM 10.1 05/17/2022    ALT 22 05/17/2022    ALKPHOS 47 05/17/2022    LABIL2 1.6 02/10/2016     Lab Results   Component Value Date    CKTOTAL 79 05/17/2022     Lab Results   Component Value Date    WBC 5.72 05/17/2022    HGB 16.3 05/17/2022    HCT 47.1 05/17/2022     05/17/2022     No results found for: INR  No results found for: MG  Lab Results   Component Value Date    PSA 0.84 04/08/2022     No results found for: BNP  Lab Results   Component Value Date    CHLPL 143 02/10/2016    CHOL 115 05/17/2022    TRIG 139 05/17/2022    HDL 32 (L) 05/17/2022    VLDL 25 05/17/2022    LDLHDL 1.73 05/17/2022     Lab Results   Component Value Date    LDL 58 05/17/2022    LDL 91 01/18/2017       Procedures       I personally viewed and interpreted the patient's LAB data         Assessment:     1. Tick bite of left thigh, initial encounter    2. Coronary artery disease involving native coronary artery of native heart without angina pectoris    3. Essential hypertension          Plan:     Patient has an area of redness inflammation and insect bite on the left upper thigh it is consistent with tick bite.  Patient was given doxycycline 100 twice daily for 10 days.  Side effects including diarrhea were discussed and explained.  Blood pressure is very well controlled.  Patient also has known coronary artery disease status post PCI asymptomatic.  We will continue current medications  Follow-up scheduled        No follow-ups on file.

## 2022-06-21 RX ORDER — MEMANTINE HYDROCHLORIDE 10 MG/1
TABLET ORAL
Qty: 180 TABLET | Refills: 0 | Status: SHIPPED | OUTPATIENT
Start: 2022-06-21 | End: 2022-09-20

## 2022-06-21 RX ORDER — LISINOPRIL 10 MG/1
TABLET ORAL
Qty: 180 TABLET | Refills: 0 | Status: SHIPPED | OUTPATIENT
Start: 2022-06-21 | End: 2022-09-20

## 2022-07-20 ENCOUNTER — OFFICE VISIT (OUTPATIENT)
Dept: CARDIOLOGY | Facility: CLINIC | Age: 77
End: 2022-07-20

## 2022-07-20 VITALS
HEART RATE: 61 BPM | HEIGHT: 71 IN | DIASTOLIC BLOOD PRESSURE: 64 MMHG | OXYGEN SATURATION: 97 % | WEIGHT: 213 LBS | SYSTOLIC BLOOD PRESSURE: 122 MMHG | TEMPERATURE: 96.9 F | BODY MASS INDEX: 29.82 KG/M2

## 2022-07-20 DIAGNOSIS — I10 ESSENTIAL HYPERTENSION: ICD-10-CM

## 2022-07-20 DIAGNOSIS — E11.9 TYPE 2 DIABETES MELLITUS WITHOUT COMPLICATION, WITHOUT LONG-TERM CURRENT USE OF INSULIN: ICD-10-CM

## 2022-07-20 DIAGNOSIS — E78.2 MIXED HYPERLIPIDEMIA: Primary | ICD-10-CM

## 2022-07-20 DIAGNOSIS — C61 PROSTATE CA: ICD-10-CM

## 2022-07-20 DIAGNOSIS — I25.10 CORONARY ARTERY DISEASE INVOLVING NATIVE CORONARY ARTERY OF NATIVE HEART WITHOUT ANGINA PECTORIS: ICD-10-CM

## 2022-07-20 PROCEDURE — G0439 PPPS, SUBSEQ VISIT: HCPCS | Performed by: INTERNAL MEDICINE

## 2022-07-20 NOTE — PROGRESS NOTES
Initial Medicare Wellness Visit   The ABC's of the Annual Wellness Visit    Chief Complaint   Patient presents with   • Medicare Wellness-Initial Visit       HPI:  SINDHU Toth-1945, is a 77 y.o. male who presents for an Initial Medicare Wellness Visit.    Recent Hospitalizations:  No hospitalization(s) within the last year..    Current Medical Providers:  Patient Care Team:  Adryan Rodney MD as PCP - General (Cardiology)    Health Habits and Functional and Cognitive Screening and Depression Screening:  Functional & Cognitive Status 2022   Do you have difficulty preparing food and eating? No   Do you have difficulty bathing yourself, getting dressed or grooming yourself? No   Do you have difficulty using the toilet? No   Do you have difficulty moving around from place to place? No   Do you have trouble with steps or getting out of a bed or a chair? No   Current Diet Unhealthy Diet   Dental Exam Not up to date   Eye Exam Up to date   Exercise (times per week) 2 times per week   Current Exercises Include Walking   Do you need help using the phone?  No   Are you deaf or do you have serious difficulty hearing?  No   Do you need help with transportation? No   Do you need help shopping? No   Do you need help preparing meals?  No   Do you need help with housework?  No   Do you need help with laundry? No   Do you need help taking your medications? No   Do you need help managing money? No   Do you ever drive or ride in a car without wearing a seat belt? No   Have you felt unusual stress, anger or loneliness in the last month? No   Who do you live with? Spouse   If you need help, do you have trouble finding someone available to you? No   Do you have difficulty concentrating, remembering or making decisions? No       Compared to one year ago, the patient feels his physical health is the same and his mental health is the same.    Depression Screen:  PHQ-2/PHQ-9 Depression Screening 2022   Retired  "PHQ-9 Total Score -   Retired Total Score -   Little Interest or Pleasure in Doing Things 0-->not at all   Feeling Down, Depressed or Hopeless 0-->not at all   PHQ-9: Brief Depression Severity Measure Score 0         Past Medical/Family/Social History:  The following portions of the patient's history were reviewed and updated as appropriate: current medications, past family history, past medical history, past social history, past surgical history and problem list.      Aspirin use counseling: Taking ASA appropriately as indicated    Current medication list contains no high risk medications.  No harmful drug interactions have been identified.     Social History     Tobacco Use   • Smoking status: Former Smoker     Quit date: 1991     Years since quittin.1   • Smokeless tobacco: Never Used   Substance Use Topics   • Alcohol use: No       Review of Systems    Objective     Vitals:    22 1457   BP: 122/64   BP Location: Left arm   Patient Position: Sitting   Cuff Size: Adult   Pulse: 61   Temp: 96.9 °F (36.1 °C)   SpO2: 97%   Weight: 96.6 kg (213 lb)   Height: 180.3 cm (71\")   PainSc: 0-No pain       BMI is >= 25 and <30. (Overweight) The following options were offered after discussion;: weight loss educational material (shared in after visit summary)      No exam data present    The patient has no evidence of cognitve impairment.     Physical Exam    Recent Lab Results:     Lab Results   Component Value Date    CHOL 115 2022    TRIG 139 2022    HDL 32 (L) 2022    VLDL 25 2022    LDLHDL 1.73 2022         Assessment & Plan   Age-appropriate Screening Schedule:  Refer to the list below for future screening recommendations based on patient's age, sex and/or medical conditions.      Health Maintenance   Topic Date Due   • DIABETIC EYE EXAM  2022   • DIABETIC FOOT EXAM  2022   • INFLUENZA VACCINE  10/01/2022   • PROSTATE CANCER SCREENING  10/13/2022   • HEMOGLOBIN A1C "  11/17/2022   • LIPID PANEL  05/17/2023   • URINE MICROALBUMIN  05/17/2023   • DXA SCAN  09/13/2023   • TDAP/TD VACCINES  Discontinued   • ZOSTER VACCINE  Discontinued         Medicare Risks and Personalized Health Plan:  CMS-Preventive Services Quick Reference  Abdominal Aortic Aneurysm Screening  Advance Directive Discussion    Advance Care Planning:  ACP discussion was held with the patient during this visit. Patient does not have an advance directive, information provided.    Diagnoses and all orders for this visit:    1. Mixed hyperlipidemia (Primary)    2. Essential hypertension    3. Coronary artery disease involving native coronary artery of native heart without angina pectoris    4. Prostate CA (HCC)    5. Type 2 diabetes mellitus without complication, without long-term current use of insulin (HCC)      PLAN    Health maintenance issues were discussed in detail with the patient  Blood pressure is very well controlled he will continue current medication.  Coronary artery disease status post myocardial infarction PCI asymptomatic he will continue aspirin and statin therapy with Crestor.  He also has prostate CA and is following with urologist PSAs being monitored.  Diabetes mellitus is also very well controlled.    Advance directive issue was discussed.  Abdominal aortic aneurysm screening  Patient had CT scan of the abdomen done in the past which addressed the calibers of the vessels including aorta.      An After Visit Summary and PPPS with all of these plans were given to the patient.      Follow Up:  No follow-ups on file.

## 2022-08-01 RX ORDER — METFORMIN HYDROCHLORIDE 500 MG/1
TABLET, EXTENDED RELEASE ORAL
Qty: 90 TABLET | Refills: 0 | Status: SHIPPED | OUTPATIENT
Start: 2022-08-01 | End: 2022-10-25

## 2022-08-11 ENCOUNTER — TELEPHONE (OUTPATIENT)
Dept: CARDIOLOGY | Facility: CLINIC | Age: 77
End: 2022-08-11

## 2022-08-12 NOTE — TELEPHONE ENCOUNTER
Contacted patient and informed him he could come  excuse at anytime. Patient verbalized understanding

## 2022-08-18 ENCOUNTER — OFFICE VISIT (OUTPATIENT)
Dept: CARDIOLOGY | Facility: CLINIC | Age: 77
End: 2022-08-18

## 2022-08-18 VITALS
DIASTOLIC BLOOD PRESSURE: 66 MMHG | SYSTOLIC BLOOD PRESSURE: 118 MMHG | BODY MASS INDEX: 29.82 KG/M2 | OXYGEN SATURATION: 99 % | WEIGHT: 213 LBS | HEIGHT: 71 IN | HEART RATE: 57 BPM | TEMPERATURE: 96.9 F

## 2022-08-18 DIAGNOSIS — I10 ESSENTIAL HYPERTENSION: ICD-10-CM

## 2022-08-18 DIAGNOSIS — E11.9 TYPE 2 DIABETES MELLITUS WITHOUT COMPLICATION, WITHOUT LONG-TERM CURRENT USE OF INSULIN: ICD-10-CM

## 2022-08-18 DIAGNOSIS — I25.10 CORONARY ARTERY DISEASE INVOLVING NATIVE CORONARY ARTERY OF NATIVE HEART WITHOUT ANGINA PECTORIS: ICD-10-CM

## 2022-08-18 DIAGNOSIS — E66.09 CLASS 1 OBESITY DUE TO EXCESS CALORIES WITHOUT SERIOUS COMORBIDITY WITH BODY MASS INDEX (BMI) OF 33.0 TO 33.9 IN ADULT: ICD-10-CM

## 2022-08-18 DIAGNOSIS — E78.2 MIXED HYPERLIPIDEMIA: Primary | ICD-10-CM

## 2022-08-18 PROCEDURE — 93000 ELECTROCARDIOGRAM COMPLETE: CPT | Performed by: INTERNAL MEDICINE

## 2022-08-18 PROCEDURE — 99214 OFFICE O/P EST MOD 30 MIN: CPT | Performed by: INTERNAL MEDICINE

## 2022-08-18 RX ORDER — ROSUVASTATIN CALCIUM 20 MG/1
20 TABLET, COATED ORAL DAILY
Qty: 90 TABLET | Refills: 1 | Status: SHIPPED | OUTPATIENT
Start: 2022-08-18 | End: 2022-11-17 | Stop reason: SDUPTHER

## 2022-08-21 NOTE — PROGRESS NOTES
subjective     Chief Complaint   Patient presents with   • Coronary Artery Disease     Follow up   • Hyperlipidemia     Follow up     History of Present Illness    Patient is 77 years old white male who is here for follow-up of multiple chronic medical problems.  He states that he is doing very well and has no specific complaints.    Coronary artery disease status post myocardial infarction and PCI to RCA in 2005.  No chest pain palpitations or shortness of breath.  He is taking antiplatelet therapy with aspirin and statin therapy with Crestor.    Hypertension is very well controlled.  Patient is taking lisinopril 10 mg twice daily.    Hyperlipidemia, patient is taking Crestor 20 mg daily without any drug side effects.    Diabetes mellitus type 2 on metformin  mg daily and Farxiga 10 mg daily.    GERD symptoms are controlled with omeprazole .    Mild memory loss on Aricept and Namenda and doing very well.  Past Surgical History:   Procedure Laterality Date   • CARDIAC CATHETERIZATION  08/2010   • CARDIOVASCULAR STRESS TEST  11/2014   • CARDIOVASCULAR STRESS TEST  11/2014   • COLONOSCOPY  09/01/2014   • COLONOSCOPY  09/2014   • CORONARY STENT PLACEMENT  2005   • CORONARY STENT PLACEMENT  2010   • ECHO - CONVERTED  08/2011   • OTHER SURGICAL HISTORY      Cath Stent Placement 2005 and 2010.   • PROSTATECTOMY      Radical     Family History   Problem Relation Age of Onset   • Coronary artery disease Other    • Cancer Other         Colon   • Alzheimer's disease Mother    • Cancer Sister    • No Known Problems Brother    • Heart disease Sister      Past Medical History:   Diagnosis Date   • Anxiety    • CAD (coronary artery disease)    • Caffeine use     2 Cups Daily.    • Depression    • Diabetes mellitus (HCC)    • Hyperlipidemia    • Hypertension    • Myocardial infarction (HCC)    • Obesity    • Prostate cancer (HCC)      Patient Active Problem List   Diagnosis   • CAD (coronary artery disease) status post  "myocardial infarction and PCI to the RCA in    • Diabetes mellitus (HCC)   • Mixed hyperlipidemia   • Depression   • Essential hypertension   • Obesity   • Prostate CA (HCC)   • Memory loss   • Environmental allergies   • Vitamin D deficiency   • Shortness of breath   • Simple chronic bronchitis (HCC)   • Multiple pulmonary nodules   • Tick bite of left thigh       Social History     Tobacco Use   • Smoking status: Former Smoker     Quit date: 1991     Years since quittin.2   • Smokeless tobacco: Never Used   Vaping Use   • Vaping Use: Never used   Substance Use Topics   • Alcohol use: No   • Drug use: No       Allergies   Allergen Reactions   • Penicillins Other (See Comments)     Yrs ago went \"out of it\"     • Sulfa Antibiotics Hives       Current Outpatient Medications on File Prior to Visit   Medication Sig   • albuterol sulfate  (90 Base) MCG/ACT inhaler Inhale 2 puffs Every 4 (Four) Hours As Needed for Wheezing.   • aspirin 325 MG tablet Take 325 mg by mouth daily.   • Blood Glucose Monitoring Suppl (Blood Glucose Monitor System) w/Device kit 1 each Daily.   • cholecalciferol (VITAMIN D3) 1000 UNITS tablet Take 1,000 Units by mouth daily.   • dapagliflozin Propanediol (Farxiga) 10 MG tablet Take 10 mg by mouth Daily.   • donepezil (ARICEPT) 10 MG tablet TAKE ONE TABLET BY MOUTH ONCE NIGHTLY   • glucose blood test strip 1 each by Other route As Needed (prn). Use as instructed   • imipramine (TOFRANIL) 50 MG tablet Take 50 mg by mouth Every Night.   • Lancets misc 1 stick Daily.   • linaclotide (LINZESS) 72 MCG capsule capsule Take 1 capsule by mouth Every Morning Before Breakfast.   • lisinopril (PRINIVIL,ZESTRIL) 10 MG tablet TAKE ONE TABLET BY MOUTH TWICE A DAY   • memantine (NAMENDA) 10 MG tablet TAKE ONE TABLET BY MOUTH TWICE A DAY   • metFORMIN ER (GLUCOPHAGE-XR) 500 MG 24 hr tablet TAKE ONE TABLET BY MOUTH DAILY WITH BREAKFAST   • montelukast (SINGULAIR) 10 MG tablet TAKE ONE TABLET " "BY MOUTH EVERY NIGHT AT BEDTIME   • nitroglycerin (NITROSTAT) 0.4 MG SL tablet 1 under the tongue as needed for angina, may repeat q5mins for up three doses   • Omeprazole Magnesium 10 MG pack Take 20 mg by mouth At Night As Needed (as needed for heartburn).   • tiotropium bromide-olodaterol (STIOLTO RESPIMAT) 2.5-2.5 MCG/ACT aerosol solution inhaler Inhale 2 puffs Daily.   • vitamin B-12 (CYANOCOBALAMIN) 1000 MCG tablet Take 1,000 mcg by mouth daily.     No current facility-administered medications on file prior to visit.         The following portions of the patient's history were reviewed and updated as appropriate: allergies, current medications, past family history, past medical history, past social history, past surgical history and problem list.    Review of Systems   Constitutional: Negative.   HENT: Negative.  Negative for congestion.    Eyes: Negative.    Cardiovascular: Negative.  Negative for chest pain, cyanosis, dyspnea on exertion, irregular heartbeat, leg swelling, near-syncope, orthopnea, palpitations, paroxysmal nocturnal dyspnea and syncope.   Respiratory: Negative.  Negative for shortness of breath.    Hematologic/Lymphatic: Negative.    Musculoskeletal: Negative.    Gastrointestinal: Negative.    Neurological: Negative.  Negative for headaches.          Objective:     /66 (BP Location: Left arm, Patient Position: Sitting, Cuff Size: Adult)   Pulse 57   Temp 96.9 °F (36.1 °C)   Ht 180.3 cm (71\")   Wt 96.6 kg (213 lb)   SpO2 99%   BMI 29.71 kg/m²   Pulmonary:      Effort: Pulmonary effort is normal.      Breath sounds: Normal breath sounds. No stridor. No wheezing. No rhonchi. No rales.   Cardiovascular:      PMI at left midclavicular line. Normal rate. Regular rhythm. Normal S1. Normal S2.      Murmurs: There is no murmur.      No gallop. No click. No rub.   Pulses:     Intact distal pulses.   Edema:     Peripheral edema absent.           Lab Review  Lab Results   Component Value Date "     05/17/2022    K 4.7 05/17/2022     05/17/2022    BUN 15 05/17/2022    CREATININE 1.18 05/17/2022    GLUCOSE 141 (H) 05/17/2022    CALCIUM 10.1 05/17/2022    ALT 22 05/17/2022    ALKPHOS 47 05/17/2022    LABIL2 1.6 02/10/2016     Lab Results   Component Value Date    CKTOTAL 79 05/17/2022     Lab Results   Component Value Date    WBC 5.72 05/17/2022    HGB 16.3 05/17/2022    HCT 47.1 05/17/2022     05/17/2022     No results found for: INR  No results found for: MG  Lab Results   Component Value Date    PSA 0.88 07/08/2022     No results found for: BNP  Lab Results   Component Value Date    CHLPL 143 02/10/2016    CHOL 115 05/17/2022    TRIG 139 05/17/2022    HDL 32 (L) 05/17/2022    VLDL 25 05/17/2022    LDLHDL 1.73 05/17/2022     Lab Results   Component Value Date    LDL 58 05/17/2022    LDL 91 01/18/2017         ECG 12 Lead    Date/Time: 8/21/2022 1:46 PM  Performed by: Adryan Rodney MD  Authorized by: Adryan Rodney MD   Comparison: compared with previous ECG from 5/10/2021  Similar to previous ECG  Rhythm: sinus bradycardia  Rate: bradycardic  BPM: 58  QRS axis: normal  Other findings: non-specific ST-T wave changes    Clinical impression: abnormal EKG  Comments: Old inferior wall myocardial infarction cannot be excluded               I personally viewed and interpreted the patient's LAB data         Assessment:     1. Mixed hyperlipidemia    2. Essential hypertension    3. Coronary artery disease involving native coronary artery of native heart without angina pectoris    4. Class 1 obesity due to excess calories without serious comorbidity with body mass index (BMI) of 33.0 to 33.9 in adult    5. Type 2 diabetes mellitus without complication, without long-term current use of insulin (HCC)          Plan:     Hyperlipidemia  Patient is trying to diet and is taking Crestor which will be continued.,  Last LDL was 58 3 months ago lab work scheduled for next visit.    Diabetes  mellitus type 2 on metformin and farxiga, last blood sugar 141 3 months ago since then he has been taking Farxiga with metformin we will check lab work next visit including hemoglobin A1c and urine for microalbumin.    Essential hypertension  Blood pressure is well controlled, he will continue lisinopril 10 twice daily.    Coronary artery disease status post inferior myocardial infarction and PCI to RCA in 2005, last stress test in [place] 2019 which was negative patient is asymptomatic.  EKG today does not show any acute changes  He is taking aspirin and Crestor unfortunately he cannot tolerate beta-blocker therapy because of bradycardia.    Slight memory loss better with Aricept and Namenda maintaining current level.    Patient also has history of prostrate CA and COPD overall is doing very well healthy lifestyle emphasized follow-up scheduled          No follow-ups on file.

## 2022-08-24 RX ORDER — DONEPEZIL HYDROCHLORIDE 10 MG/1
TABLET, FILM COATED ORAL
Qty: 90 TABLET | Refills: 1 | Status: SHIPPED | OUTPATIENT
Start: 2022-08-24 | End: 2023-02-27

## 2022-09-20 RX ORDER — MEMANTINE HYDROCHLORIDE 10 MG/1
TABLET ORAL
Qty: 180 TABLET | Refills: 0 | Status: SHIPPED | OUTPATIENT
Start: 2022-09-20 | End: 2022-12-19

## 2022-09-20 RX ORDER — LISINOPRIL 10 MG/1
TABLET ORAL
Qty: 180 TABLET | Refills: 0 | Status: SHIPPED | OUTPATIENT
Start: 2022-09-20 | End: 2022-12-19

## 2022-10-25 RX ORDER — METFORMIN HYDROCHLORIDE 500 MG/1
TABLET, EXTENDED RELEASE ORAL
Qty: 90 TABLET | Refills: 0 | Status: SHIPPED | OUTPATIENT
Start: 2022-10-25 | End: 2022-11-17 | Stop reason: SDUPTHER

## 2022-11-17 ENCOUNTER — OFFICE VISIT (OUTPATIENT)
Dept: CARDIOLOGY | Facility: CLINIC | Age: 77
End: 2022-11-17

## 2022-11-17 VITALS
HEIGHT: 71 IN | SYSTOLIC BLOOD PRESSURE: 116 MMHG | OXYGEN SATURATION: 97 % | HEART RATE: 64 BPM | DIASTOLIC BLOOD PRESSURE: 66 MMHG | BODY MASS INDEX: 29.82 KG/M2 | WEIGHT: 213 LBS

## 2022-11-17 DIAGNOSIS — C61 PROSTATE CA: ICD-10-CM

## 2022-11-17 DIAGNOSIS — E78.2 MIXED HYPERLIPIDEMIA: ICD-10-CM

## 2022-11-17 DIAGNOSIS — I10 ESSENTIAL HYPERTENSION: ICD-10-CM

## 2022-11-17 DIAGNOSIS — I25.10 CORONARY ARTERY DISEASE INVOLVING NATIVE CORONARY ARTERY OF NATIVE HEART WITHOUT ANGINA PECTORIS: Primary | ICD-10-CM

## 2022-11-17 PROCEDURE — 99214 OFFICE O/P EST MOD 30 MIN: CPT | Performed by: INTERNAL MEDICINE

## 2022-11-17 RX ORDER — ROSUVASTATIN CALCIUM 20 MG/1
20 TABLET, COATED ORAL DAILY
Qty: 90 TABLET | Refills: 1 | Status: SHIPPED | OUTPATIENT
Start: 2022-11-17 | End: 2023-02-16 | Stop reason: SDUPTHER

## 2022-11-17 RX ORDER — METFORMIN HYDROCHLORIDE 500 MG/1
1000 TABLET, EXTENDED RELEASE ORAL
Qty: 180 TABLET | Refills: 2 | Status: SHIPPED | OUTPATIENT
Start: 2022-11-17

## 2022-11-17 NOTE — PROGRESS NOTES
subjective     Chief Complaint   Patient presents with   • Hyperlipidemia       Follow up   • Results     Labs     • Coronary Artery Disease     Follow up   • Med Refill     pending     History of Present Illness      Past Surgical History:   Procedure Laterality Date   • CARDIAC CATHETERIZATION  2010   • CARDIOVASCULAR STRESS TEST  2014   • CARDIOVASCULAR STRESS TEST  2014   • COLONOSCOPY  2014   • COLONOSCOPY  2014   • CORONARY STENT PLACEMENT     • CORONARY STENT PLACEMENT     • ECHO - CONVERTED  2011   • OTHER SURGICAL HISTORY      Cath Stent Placement  and .   • PROSTATECTOMY      Radical     Family History   Problem Relation Age of Onset   • Coronary artery disease Other    • Cancer Other         Colon   • Alzheimer's disease Mother    • Cancer Sister    • No Known Problems Brother    • Heart disease Sister      Past Medical History:   Diagnosis Date   • Anxiety    • CAD (coronary artery disease)    • Caffeine use     2 Cups Daily.    • Depression    • Diabetes mellitus (HCC)    • Hyperlipidemia    • Hypertension    • Myocardial infarction (HCC)    • Obesity    • Prostate cancer (HCC)      Patient Active Problem List   Diagnosis   • CAD (coronary artery disease) status post myocardial infarction and PCI to the RCA in    • Diabetes mellitus (HCC)   • Mixed hyperlipidemia   • Depression   • Essential hypertension   • Obesity   • Prostate CA (HCC)   • Memory loss   • Environmental allergies   • Vitamin D deficiency   • Shortness of breath   • Simple chronic bronchitis (HCC)   • Multiple pulmonary nodules   • Tick bite of left thigh       Social History     Tobacco Use   • Smoking status: Former     Types: Cigarettes     Quit date: 1991     Years since quittin.5   • Smokeless tobacco: Never   Vaping Use   • Vaping Use: Never used   Substance Use Topics   • Alcohol use: No   • Drug use: No       Allergies   Allergen Reactions   • Penicillins Other (See Comments)     " Yrs ago went \"out of it\"     • Sulfa Antibiotics Hives       Current Outpatient Medications on File Prior to Visit   Medication Sig   • albuterol sulfate  (90 Base) MCG/ACT inhaler Inhale 2 puffs Every 4 (Four) Hours As Needed for Wheezing.   • aspirin 325 MG tablet Take 325 mg by mouth daily.   • Blood Glucose Monitoring Suppl (Blood Glucose Monitor System) w/Device kit 1 each Daily.   • cholecalciferol (VITAMIN D3) 1000 UNITS tablet Take 1,000 Units by mouth daily.   • dapagliflozin Propanediol (Farxiga) 10 MG tablet Take 10 mg by mouth Daily.   • donepezil (ARICEPT) 10 MG tablet TAKE ONE TABLET BY MOUTH ONCE NIGHTLY   • glucose blood test strip 1 each by Other route As Needed (prn). Use as instructed   • imipramine (TOFRANIL) 50 MG tablet Take 50 mg by mouth Every Night.   • Lancets misc 1 stick Daily.   • linaclotide (LINZESS) 72 MCG capsule capsule Take 1 capsule by mouth Every Morning Before Breakfast.   • lisinopril (PRINIVIL,ZESTRIL) 10 MG tablet TAKE ONE TABLET BY MOUTH TWICE A DAY   • memantine (NAMENDA) 10 MG tablet TAKE ONE TABLET BY MOUTH TWICE A DAY   • montelukast (SINGULAIR) 10 MG tablet TAKE ONE TABLET BY MOUTH EVERY NIGHT AT BEDTIME   • nitroglycerin (NITROSTAT) 0.4 MG SL tablet 1 under the tongue as needed for angina, may repeat q5mins for up three doses   • Omeprazole Magnesium 10 MG pack Take 20 mg by mouth At Night As Needed (as needed for heartburn).   • tiotropium bromide-olodaterol (STIOLTO RESPIMAT) 2.5-2.5 MCG/ACT aerosol solution inhaler Inhale 2 puffs Daily.   • vitamin B-12 (CYANOCOBALAMIN) 1000 MCG tablet Take 1,000 mcg by mouth daily.     No current facility-administered medications on file prior to visit.         The following portions of the patient's history were reviewed and updated as appropriate: allergies, current medications, past family history, past medical history, past social history, past surgical history and problem list.    ROS       Objective:     /66 (BP " "Location: Left arm, Patient Position: Sitting, Cuff Size: Adult)   Pulse 64   Ht 180.3 cm (71\")   Wt 96.6 kg (213 lb)   SpO2 97%   BMI 29.71 kg/m²   Physical Exam      Lab Review    Hemoglobin A1c 6.8, blood sugar 159, CBC normal    Procedures       I personally viewed and interpreted the patient's LAB data         Assessment:     1. Coronary artery disease involving native coronary artery of native heart without angina pectoris    2. Essential hypertension    3. Mixed hyperlipidemia    4. Prostate CA (HCC)          Plan:     Patient has history of prostatic cancer status post robotic assisted prostatectomy and iliac lymph node resection recently had PET scan which did not show any suspicious lesions.  Small 6 x 3 benign appearing left external iliac lymph node noted.  He is following closely at .    Coronary artery disease status post myocardial infarction PCI to RCA.  He is asymptomatic does not wish to have any cardiac work-up including stress test.  He will continue current medications.  He is on antiplatelet therapy with aspirin, statin therapy with Crestor however no beta-blocker therapy.  Blood pressure is stable he will continue lisinopril    Hyperlipidemia  Total cholesterol is 122 HDL 33 triglyceride 209 LDL 62.  Crestor was continued.    Diabetes mellitus on metformin and farxiga  Blood sugar is mildly elevated with hemoglobin A1c 6.8.  Healthy lifestyle and weight loss emphasized.    No follow-ups on file.  "

## 2022-11-29 RX ORDER — IMIPRAMINE HCL 50 MG
50 TABLET ORAL NIGHTLY
OUTPATIENT
Start: 2022-11-29

## 2022-12-19 RX ORDER — MEMANTINE HYDROCHLORIDE 10 MG/1
TABLET ORAL
Qty: 180 TABLET | Refills: 0 | Status: SHIPPED | OUTPATIENT
Start: 2022-12-19 | End: 2023-03-20

## 2022-12-19 RX ORDER — LISINOPRIL 10 MG/1
TABLET ORAL
Qty: 180 TABLET | Refills: 0 | Status: SHIPPED | OUTPATIENT
Start: 2022-12-19 | End: 2023-03-20

## 2023-01-17 ENCOUNTER — TELEPHONE (OUTPATIENT)
Dept: CARDIOLOGY | Facility: CLINIC | Age: 78
End: 2023-01-17
Payer: MEDICARE

## 2023-01-17 ENCOUNTER — TELEPHONE (OUTPATIENT)
Dept: CARDIOLOGY | Facility: CLINIC | Age: 78
End: 2023-01-17

## 2023-01-17 DIAGNOSIS — U07.1 POSITIVE SELF-ADMINISTERED ANTIGEN TEST FOR COVID-19: Primary | ICD-10-CM

## 2023-01-17 NOTE — TELEPHONE ENCOUNTER
Sent in RX and given instructions to stop chol medication x 10 days.  Do not start new rx until off for 12 hrs.  Berna cruzu

## 2023-01-17 NOTE — TELEPHONE ENCOUNTER
Caller: Penny Singer     Best call back number: [phone]     What is the best time to reach you:ANY     What was the call regarding: PT HAS COVID - PATIENTS WIFE WENT TO QUICK CLINIC AND THEY SAID FOR HER TO CALL HER PCP AND ASK FOR PAXLOVID       Do you require a callback: YES

## 2023-02-06 RX ORDER — LANCETS 30 GAUGE
1 EACH MISCELLANEOUS DAILY
Qty: 100 EACH | Refills: 3 | Status: SHIPPED | OUTPATIENT
Start: 2023-02-06

## 2023-02-06 NOTE — TELEPHONE ENCOUNTER
Caller: Rose Singer    Relationship: Self    Best call back number: [phone]    Requested Prescriptions:   Requested Prescriptions     Pending Prescriptions Disp Refills   • Lancets misc 100 each 3     Si stick Daily.        Pharmacy where request should be sent: [facility] [id number] [place], [place] - [address] [facility] AT [address] [place] [address] - [phone]  - [phone] FX       Does the patient have less than a 3 day supply:  [] Yes  [x] No    Would you like a call back once the refill request has been completed: [] Yes [] No    If the office needs to give you a call back, can they leave a voicemail: [] Yes [] No    Ayo Li Rep   23 10:53 EST

## 2023-02-16 ENCOUNTER — OFFICE VISIT (OUTPATIENT)
Dept: CARDIOLOGY | Facility: CLINIC | Age: 78
End: 2023-02-16
Payer: MEDICARE

## 2023-02-16 VITALS
WEIGHT: 208.6 LBS | HEART RATE: 67 BPM | SYSTOLIC BLOOD PRESSURE: 127 MMHG | BODY MASS INDEX: 29.2 KG/M2 | DIASTOLIC BLOOD PRESSURE: 83 MMHG | HEIGHT: 71 IN | OXYGEN SATURATION: 100 %

## 2023-02-16 DIAGNOSIS — I10 ESSENTIAL HYPERTENSION: ICD-10-CM

## 2023-02-16 DIAGNOSIS — E78.2 MIXED HYPERLIPIDEMIA: ICD-10-CM

## 2023-02-16 DIAGNOSIS — I25.10 CORONARY ARTERY DISEASE INVOLVING NATIVE CORONARY ARTERY OF NATIVE HEART WITHOUT ANGINA PECTORIS: Primary | ICD-10-CM

## 2023-02-16 DIAGNOSIS — E11.9 TYPE 2 DIABETES MELLITUS WITHOUT COMPLICATION, WITHOUT LONG-TERM CURRENT USE OF INSULIN: ICD-10-CM

## 2023-02-16 DIAGNOSIS — E55.9 VITAMIN D DEFICIENCY: ICD-10-CM

## 2023-02-16 PROCEDURE — 99214 OFFICE O/P EST MOD 30 MIN: CPT | Performed by: INTERNAL MEDICINE

## 2023-02-16 RX ORDER — ROSUVASTATIN CALCIUM 20 MG/1
20 TABLET, COATED ORAL DAILY
Qty: 90 TABLET | Refills: 1 | Status: SHIPPED | OUTPATIENT
Start: 2023-02-16

## 2023-02-16 RX ORDER — MONTELUKAST SODIUM 10 MG/1
10 TABLET ORAL
Qty: 90 TABLET | Refills: 0 | Status: SHIPPED | OUTPATIENT
Start: 2023-02-16

## 2023-02-16 NOTE — PROGRESS NOTES
Follow-up.subjective     Chief Complaint   Patient presents with   • Follow-up     3 month   • Coronary Artery Disease     History of Present Illness  Patient is 77 years old white male who is here for 3-month follow-up.  Coronary artery disease  Status post myocardial infarction and PCI to the RCA in 2005  He is doing very well he denies any chest pain palpitations or shortness of breath.  He is taking statin therapy with Crestor, aspirin and farxiga    Hyperlipidemia has been well controlled with Crestor    Primary hypertension  Patient is taking lisinopril 10 mg twice a day blood pressure has been well controlled.    Diabetes mellitus type 2  He is taking metformin 1000 mg daily along with Farxiga 10 mg daily    Memory loss  Doing better with Aricept and Namenda.      Past Surgical History:   Procedure Laterality Date   • CARDIAC CATHETERIZATION  08/2010   • CARDIOVASCULAR STRESS TEST  11/2014   • CARDIOVASCULAR STRESS TEST  11/2014   • COLONOSCOPY  09/01/2014   • COLONOSCOPY  09/2014   • CORONARY STENT PLACEMENT  2005   • CORONARY STENT PLACEMENT  2010   • ECHO - CONVERTED  08/2011   • OTHER SURGICAL HISTORY      Cath Stent Placement 2005 and 2010.   • PROSTATECTOMY      Radical     Family History   Problem Relation Age of Onset   • Alzheimer's disease Mother    • No Known Problems Brother    • Coronary artery disease Other    • Cancer Other         Colon     Past Medical History:   Diagnosis Date   • Anxiety    • CAD (coronary artery disease)    • Caffeine use     2 Cups Daily.    • Depression    • Diabetes mellitus (HCC)    • Hyperlipidemia    • Hypertension    • Myocardial infarction (HCC)    • Obesity    • Prostate cancer (HCC)      Patient Active Problem List   Diagnosis   • CAD (coronary artery disease) status post myocardial infarction and PCI to the RCA in 2005   • Diabetes mellitus (HCC)   • Mixed hyperlipidemia   • Depression   • Essential hypertension   • Obesity   • Prostate CA (HCC)   • Memory loss  "  • Environmental allergies   • Vitamin D deficiency   • Shortness of breath   • Simple chronic bronchitis (HCC)   • Multiple pulmonary nodules   • Tick bite of left thigh       Social History     Tobacco Use   • Smoking status: Former     Types: Cigarettes     Quit date: 1991     Years since quittin.7   • Smokeless tobacco: Never   Vaping Use   • Vaping Use: Never used   Substance Use Topics   • Alcohol use: No   • Drug use: No       Allergies   Allergen Reactions   • Penicillins Other (See Comments)     Yrs ago went \"out of it\"     • Sulfa Antibiotics Hives       Current Outpatient Medications on File Prior to Visit   Medication Sig   • albuterol sulfate  (90 Base) MCG/ACT inhaler Inhale 2 puffs Every 4 (Four) Hours As Needed for Wheezing.   • aspirin 325 MG tablet Take 325 mg by mouth daily.   • Blood Glucose Monitoring Suppl (Blood Glucose Monitor System) w/Device kit 1 each Daily.   • cholecalciferol (VITAMIN D3) 1000 UNITS tablet Take 1,000 Units by mouth daily.   • dapagliflozin Propanediol (Farxiga) 10 MG tablet Take 10 mg by mouth Daily.   • donepezil (ARICEPT) 10 MG tablet TAKE ONE TABLET BY MOUTH ONCE NIGHTLY   • glucose blood test strip 1 each by Other route As Needed (prn). Use as instructed   • imipramine (TOFRANIL) 50 MG tablet Take 50 mg by mouth Every Night.   • Lancets misc 1 stick Daily.   • linaclotide (LINZESS) 72 MCG capsule capsule Take 1 capsule by mouth Every Morning Before Breakfast.   • lisinopril (PRINIVIL,ZESTRIL) 10 MG tablet TAKE ONE TABLET BY MOUTH TWICE A DAY   • memantine (NAMENDA) 10 MG tablet TAKE ONE TABLET BY MOUTH TWICE A DAY   • metFORMIN ER (GLUCOPHAGE-XR) 500 MG 24 hr tablet Take 2 tablets by mouth Daily With Breakfast.   • nitroglycerin (NITROSTAT) 0.4 MG SL tablet 1 under the tongue as needed for angina, may repeat q5mins for up three doses   • Omeprazole Magnesium 10 MG pack Take 20 mg by mouth At Night As Needed (as needed for heartburn).   • tiotropium " "bromide-olodaterol (STIOLTO RESPIMAT) 2.5-2.5 MCG/ACT aerosol solution inhaler Inhale 2 puffs Daily.   • vitamin B-12 (CYANOCOBALAMIN) 1000 MCG tablet Take 1,000 mcg by mouth daily.   • [DISCONTINUED] montelukast (SINGULAIR) 10 MG tablet TAKE ONE TABLET BY MOUTH EVERY NIGHT AT BEDTIME   • [DISCONTINUED] rosuvastatin (CRESTOR) 20 MG tablet Take 1 tablet by mouth Daily.     No current facility-administered medications on file prior to visit.         The following portions of the patient's history were reviewed and updated as appropriate: allergies, current medications, past family history, past medical history, past social history, past surgical history and problem list.    Review of Systems   Constitutional: Negative.   HENT: Negative.  Negative for congestion.    Eyes: Negative.    Cardiovascular: Negative.  Negative for chest pain, cyanosis, dyspnea on exertion, irregular heartbeat, leg swelling, near-syncope, orthopnea, palpitations, paroxysmal nocturnal dyspnea and syncope.   Respiratory: Negative.  Negative for shortness of breath.    Hematologic/Lymphatic: Negative.    Musculoskeletal: Negative.    Gastrointestinal: Negative.    Neurological: Negative.  Negative for headaches.          Objective:     /83 (BP Location: Left arm, Patient Position: Sitting, Cuff Size: Adult)   Pulse 67   Ht 180.3 cm (71\")   Wt 94.6 kg (208 lb 9.6 oz)   SpO2 100%   BMI 29.09 kg/m²   Pulmonary:      Effort: Pulmonary effort is normal.      Breath sounds: Normal breath sounds. No stridor. No wheezing. No rhonchi. No rales.   Cardiovascular:      PMI at left midclavicular line. Normal rate. Regular rhythm. Normal S1. Normal S2.      Murmurs: There is no murmur.      No gallop. No click. No rub.   Pulses:     Intact distal pulses.   Edema:     Peripheral edema absent.           Lab Review  Lab work scheduled including CK, CMP, lipid panel, hemoglobin A1c, vitamin D    Procedures       I personally viewed and interpreted the " patient's LAB data         Assessment:     1. Coronary artery disease involving native coronary artery of native heart without angina pectoris    2. Essential hypertension    3. Mixed hyperlipidemia          Plan:     Patient is 77 years old white male with known coronary artery disease status post myocardial infarction and PCI to RCA 2005.  He is doing very well and is asymptomatic.  He has not had ischemic work-up since March 2019 at [facility].  Patient is not interested in ischemic work-up at this time.  He will continue antiplatelet therapy along with statin therapy.    Hyperlipidemia on Crestor 20 mg daily lab work scheduled.    Hypertension  Blood pressure is very well controlled  He will continue lisinopril 10 twice daily    Diabetes mellitus type 2  Metformin and Farxiga continued dietary restriction discussed.    Memory loss better with Namenda and Aricept which was continued.    Follow-up scheduled            No follow-ups on file.

## 2023-02-27 RX ORDER — DONEPEZIL HYDROCHLORIDE 10 MG/1
TABLET, FILM COATED ORAL
Qty: 90 TABLET | Refills: 1 | Status: SHIPPED | OUTPATIENT
Start: 2023-02-27

## 2023-03-20 RX ORDER — LISINOPRIL 10 MG/1
TABLET ORAL
Qty: 180 TABLET | Refills: 0 | Status: SHIPPED | OUTPATIENT
Start: 2023-03-20

## 2023-03-20 RX ORDER — MEMANTINE HYDROCHLORIDE 10 MG/1
TABLET ORAL
Qty: 180 TABLET | Refills: 0 | Status: SHIPPED | OUTPATIENT
Start: 2023-03-20

## 2023-03-27 ENCOUNTER — TELEPHONE (OUTPATIENT)
Dept: CARDIOLOGY | Facility: CLINIC | Age: 78
End: 2023-03-27
Payer: MEDICARE

## 2023-03-27 DIAGNOSIS — L72.9 CYST OF BUTTOCKS: Primary | ICD-10-CM

## 2023-03-27 NOTE — TELEPHONE ENCOUNTER
Caller: Rose Singer    Relationship to patient: Self    Best call back number: [phone]    Chief complaint: PATIENT HAS A PAINFUL SORE WHEN HE SITS AND IS REQUESTING TO BE SEEN IN THE OFFICE TODAY.     Type of visit: FOLLOW UP

## 2023-03-27 NOTE — TELEPHONE ENCOUNTER
Per verbal order DR Rodney.  Refer to gen surgery .  Pt has a cyst in buttock that is very tender and red.

## 2023-05-06 DIAGNOSIS — I10 ESSENTIAL HYPERTENSION: ICD-10-CM

## 2023-05-06 DIAGNOSIS — E78.2 MIXED HYPERLIPIDEMIA: ICD-10-CM

## 2023-05-06 DIAGNOSIS — I25.10 CORONARY ARTERY DISEASE INVOLVING NATIVE CORONARY ARTERY OF NATIVE HEART WITHOUT ANGINA PECTORIS: ICD-10-CM

## 2023-05-08 RX ORDER — MONTELUKAST SODIUM 10 MG/1
TABLET ORAL
Qty: 90 TABLET | Refills: 0 | Status: SHIPPED | OUTPATIENT
Start: 2023-05-08

## 2023-05-24 ENCOUNTER — OFFICE VISIT (OUTPATIENT)
Dept: CARDIOLOGY | Facility: CLINIC | Age: 78
End: 2023-05-24
Payer: MEDICARE

## 2023-05-24 VITALS
BODY MASS INDEX: 29.4 KG/M2 | OXYGEN SATURATION: 95 % | SYSTOLIC BLOOD PRESSURE: 132 MMHG | WEIGHT: 210 LBS | DIASTOLIC BLOOD PRESSURE: 81 MMHG | HEIGHT: 71 IN | HEART RATE: 63 BPM

## 2023-05-24 DIAGNOSIS — I10 ESSENTIAL HYPERTENSION: ICD-10-CM

## 2023-05-24 DIAGNOSIS — E11.9 TYPE 2 DIABETES MELLITUS WITHOUT COMPLICATION, WITHOUT LONG-TERM CURRENT USE OF INSULIN: ICD-10-CM

## 2023-05-24 DIAGNOSIS — E66.3 OVERWEIGHT: ICD-10-CM

## 2023-05-24 DIAGNOSIS — E78.2 MIXED HYPERLIPIDEMIA: ICD-10-CM

## 2023-05-24 DIAGNOSIS — Z91.09 ENVIRONMENTAL ALLERGIES: ICD-10-CM

## 2023-05-24 DIAGNOSIS — I25.810 CORONARY ARTERY DISEASE INVOLVING CORONARY BYPASS GRAFT OF NATIVE HEART WITHOUT ANGINA PECTORIS: Primary | ICD-10-CM

## 2023-05-24 RX ORDER — DAPAGLIFLOZIN 10 MG/1
1 TABLET, FILM COATED ORAL DAILY
Qty: 56 TABLET | Refills: 0 | COMMUNITY
Start: 2023-05-24

## 2023-05-24 NOTE — PROGRESS NOTES
Subjective     Rose Singer is a 77 y.o. male.   Chief Complaint   Patient presents with   • Coronary Artery Disease     Follow up   • Results     labs     History of Present Illness   Rose Singer is a 77-year-old male who presents to clinic today for routine follow-up.     CAD status post MI and PCI to the RCA in 2005.  He states stents were placed in 2010 as well.  He reports he is doing well and denies chest pain, palpitations or dyspnea.  He remains on aspirin and statin.     Hyperlipidemia currently on Crestor.  He reports compliance with medicine and denies medicine side effects.  He had labs prior to today's visit which she would like to review.    Hypertension controlled on lisinopril twice daily.  He reports regular routine monitoring of BP at home which is controlled.  He denies medication side effects and reports compliance.    Type 2 diabetes currently on metformin and Farxiga.  He reports doing well with medication and denies medicine side effects.  He had labs prior to today's visit and would like to review results.    Memory loss stable on Aricept and Namenda.  Environmental allergies currently on Claritin and Singulair.     He is up-to-date with colonoscopy and denies any abdominal pain or bleeding issues.    Patient Active Problem List   Diagnosis   • CAD (coronary artery disease) status post myocardial infarction and PCI to the RCA in 2005   • Diabetes mellitus   • Mixed hyperlipidemia   • Depression   • Essential hypertension   • Obesity   • Prostate CA   • Memory loss   • Environmental allergies   • Vitamin D deficiency   • Shortness of breath   • Simple chronic bronchitis   • Multiple pulmonary nodules   • Tick bite of left thigh     Past Medical History:   Diagnosis Date   • Anxiety    • Asthma    • CAD (coronary artery disease)    • Caffeine use     2 Cups Daily.    • COPD (chronic obstructive pulmonary disease)    • Depression    • Diabetes mellitus    • Hyperlipidemia    • Hypertension    •  "Myocardial infarction    • Obesity    • Prostate cancer      Past Surgical History:   Procedure Laterality Date   • CARDIAC CATHETERIZATION  2010   • CARDIOVASCULAR STRESS TEST  2014   • CARDIOVASCULAR STRESS TEST  2014   • COLONOSCOPY  2014   • COLONOSCOPY  2014   • CORONARY STENT PLACEMENT     • CORONARY STENT PLACEMENT     • ECHO - CONVERTED  2011   • OTHER SURGICAL HISTORY      Cath Stent Placement  and .   • PROSTATECTOMY      Radical       Family History   Problem Relation Age of Onset   • Alzheimer's disease Mother    • No Known Problems Brother    • Coronary artery disease Other    • Cancer Other         Colon     Social History     Tobacco Use   • Smoking status: Former     Types: Cigarettes     Quit date: 1991     Years since quittin.0   • Smokeless tobacco: Never   Vaping Use   • Vaping Use: Never used   Substance Use Topics   • Alcohol use: No   • Drug use: No         The following portions of the patient's history were reviewed and updated as appropriate: allergies, current medications, past family history, past medical history, past social history, past surgical history and problem list.    Allergies   Allergen Reactions   • Penicillins Other (See Comments)     Yrs ago went \"out of it\"     • Sulfa Antibiotics Hives         Current Outpatient Medications:   •  albuterol sulfate  (90 Base) MCG/ACT inhaler, Inhale 2 puffs Every 4 (Four) Hours As Needed for Wheezing., Disp: 6.7 g, Rfl: 11  •  aspirin 325 MG tablet, Take 1 tablet by mouth Daily., Disp: , Rfl:   •  Blood Glucose Monitoring Suppl (Blood Glucose Monitor System) w/Device kit, 1 each Daily., Disp: 1 each, Rfl: 0  •  cholecalciferol (VITAMIN D3) 1000 UNITS tablet, Take 1 tablet by mouth Daily., Disp: , Rfl:   •  dapagliflozin Propanediol (Farxiga) 10 MG tablet, Take 10 mg by mouth Daily., Disp: 90 tablet, Rfl: 1  •  donepezil (ARICEPT) 10 MG tablet, TAKE ONE TABLET BY MOUTH ONCE NIGHTLY, Disp: " 90 tablet, Rfl: 1  •  glucose blood test strip, 1 each by Other route As Needed (prn). Use as instructed, Disp: 100 each, Rfl: 1  •  imipramine (TOFRANIL) 50 MG tablet, Take 1 tablet by mouth Every Night., Disp: , Rfl:   •  Lancets misc, 1 stick Daily., Disp: 100 each, Rfl: 3  •  linaclotide (LINZESS) 72 MCG capsule capsule, Take 1 capsule by mouth Every Morning Before Breakfast., Disp: 30 capsule, Rfl: 2  •  lisinopril (PRINIVIL,ZESTRIL) 10 MG tablet, TAKE ONE TABLET BY MOUTH TWICE A DAY, Disp: 180 tablet, Rfl: 0  •  memantine (NAMENDA) 10 MG tablet, TAKE ONE TABLET BY MOUTH TWICE A DAY, Disp: 180 tablet, Rfl: 0  •  metFORMIN ER (GLUCOPHAGE-XR) 500 MG 24 hr tablet, Take 2 tablets by mouth Daily With Breakfast., Disp: 180 tablet, Rfl: 2  •  montelukast (SINGULAIR) 10 MG tablet, TAKE ONE TABLET BY MOUTH EVERY NIGHT AT BEDTIME, Disp: 90 tablet, Rfl: 0  •  nitroglycerin (NITROSTAT) 0.4 MG SL tablet, 1 under the tongue as needed for angina, may repeat q5mins for up three doses, Disp: 100 tablet, Rfl: 11  •  Omeprazole Magnesium 10 MG pack, Take 20 mg by mouth At Night As Needed (as needed for heartburn)., Disp: 60 each, Rfl: 2  •  rosuvastatin (CRESTOR) 20 MG tablet, Take 1 tablet by mouth Daily., Disp: 90 tablet, Rfl: 1  •  tiotropium bromide-olodaterol (STIOLTO RESPIMAT) 2.5-2.5 MCG/ACT aerosol solution inhaler, Inhale 2 puffs Daily., Disp: 1 each, Rfl: 8  •  vitamin B-12 (CYANOCOBALAMIN) 1000 MCG tablet, Take 1 tablet by mouth Daily., Disp: , Rfl:     Review of Systems   Constitutional: Negative for activity change, appetite change, chills, diaphoresis, fatigue and fever.   HENT: Negative for congestion, drooling, ear discharge, ear pain, mouth sores, nosebleeds, postnasal drip, rhinorrhea, sinus pressure, sneezing and sore throat.    Eyes: Negative for pain, discharge and visual disturbance.   Respiratory: Negative for cough, chest tightness, shortness of breath and wheezing.    Cardiovascular: Negative for chest  "pain, palpitations and leg swelling.   Gastrointestinal: Negative for abdominal pain, constipation, diarrhea, nausea and vomiting.   Endocrine: Negative for cold intolerance, heat intolerance, polydipsia, polyphagia and polyuria.   Musculoskeletal: Negative for arthralgias, myalgias and neck pain.   Skin: Negative for rash and wound.   Neurological: Negative for dizziness, syncope, speech difficulty, weakness, light-headedness and headaches.   Hematological: Negative for adenopathy. Does not bruise/bleed easily.   Psychiatric/Behavioral: Negative for confusion, dysphoric mood and sleep disturbance. The patient is not nervous/anxious.    All other systems reviewed and are negative.      /81 (BP Location: Left arm, Patient Position: Sitting, Cuff Size: Adult)   Pulse 63   Ht 180.3 cm (71\")   Wt 95.3 kg (210 lb)   SpO2 95%   BMI 29.29 kg/m²     Objective   Allergies   Allergen Reactions   • Penicillins Other (See Comments)     Yrs ago went \"out of it\"     • Sulfa Antibiotics Hives       Physical Exam  Vitals reviewed.   Constitutional:       Appearance: Normal appearance. He is well-developed.   HENT:      Head: Normocephalic.   Eyes:      Conjunctiva/sclera: Conjunctivae normal.   Neck:      Thyroid: No thyromegaly.      Vascular: No carotid bruit or JVD.   Cardiovascular:      Rate and Rhythm: Normal rate and regular rhythm.   Pulmonary:      Effort: Pulmonary effort is normal.      Breath sounds: Normal breath sounds.   Musculoskeletal:      Cervical back: Neck supple.      Right lower leg: No edema.      Left lower leg: No edema.   Skin:     General: Skin is warm and dry.   Neurological:      Mental Status: He is alert and oriented to person, place, and time.   Psychiatric:         Attention and Perception: Attention normal.         Mood and Affect: Mood normal.         Speech: Speech normal.         Behavior: Behavior normal. Behavior is cooperative.         Cognition and Memory: Cognition normal. "           ECG 12 Lead    Date/Time: 2023 1:51 PM  Performed by: Irene Pedro APRN  Authorized by: Irene Pedro APRN   Comparison: compared with previous ECG   Similar to previous ECG  Comparison to previous EC2022  Rhythm: sinus rhythm  Rate: normal  BPM: 63    Clinical impression: normal ECG  Comments: QT/QTc - 397/405            LABS  WBC   Date Value Ref Range Status   2023 9.01 3.70 - 10.30 10*3/uL Final     RBC   Date Value Ref Range Status   2023 5.35 4.60 - 6.10 10*6/uL Final     Hemoglobin   Date Value Ref Range Status   2023 16.0 13.7 - 17.5 g/dL Final     Hematocrit   Date Value Ref Range Status   2023 47.3 40.0 - 51.0 % Final     MCV   Date Value Ref Range Status   2023 88 79 - 98 fL Final     MCH   Date Value Ref Range Status   2023 29.9 26.0 - 32.0 pg Final     MCHC   Date Value Ref Range Status   2023 33.8 30.7 - 35.5 g/dL Final     RDW   Date Value Ref Range Status   2023 13.5 11.5 - 14.5 % Final     RDW-SD   Date Value Ref Range Status   2022 41.0 37.0 - 54.0 fl Final     MPV   Date Value Ref Range Status   2023 10.1 8.8 - 12.5 fL Final     Platelets   Date Value Ref Range Status   2023 202 155 - 369 10*3/uL Final     Neutrophil Rel %   Date Value Ref Range Status   2023 68.0 % Final     Lymphocyte Rel %   Date Value Ref Range Status   2023 20.0 % Final     Monocyte Rel %   Date Value Ref Range Status   2023 10.0 % Final     Eosinophil %   Date Value Ref Range Status   2023 2.0 % Final     Basophil Rel %   Date Value Ref Range Status   2023 0.0 % Final     Immature Grans %   Date Value Ref Range Status   2023 0.0 % Final     Neutrophils Absolute   Date Value Ref Range Status   2023 6.17 (H) 1.60 - 6.10 10*3/uL Final     Lymphocytes Absolute   Date Value Ref Range Status   2023 1.76 1.20 - 3.90 10*3/uL Final     Monocytes Absolute   Date Value Ref Range Status    04/05/2023 0.86 0.30 - 0.90 10*3/uL Final     Eosinophils Absolute   Date Value Ref Range Status   04/05/2023 0.16 0.00 - 0.50 10*3/uL Final     Basophils Absolute   Date Value Ref Range Status   04/05/2023 0.04 0.00 - 0.10 10*3/uL Final     Immature Grans, Absolute   Date Value Ref Range Status   04/05/2023 0.02 0.00 - 0.06 10*3/uL Final     nRBC   Date Value Ref Range Status   04/05/2023 0.0 <=0.0 per 100 WBCs Final   05/17/2022 0.0 0.0 - 0.2 /100 WBC Final       Total Cholesterol   Date Value Ref Range Status   05/17/2022 115 0 - 200 mg/dL Final     Triglycerides   Date Value Ref Range Status   05/17/2022 139 0 - 150 mg/dL Final     HDL Cholesterol   Date Value Ref Range Status   05/17/2022 32 (L) 40 - 60 mg/dL Final     LDL Cholesterol    Date Value Ref Range Status   05/17/2022 58 0 - 100 mg/dL Final     IMAGING  PET/CT PSMA VERTEX TO THIGH    Result Date: 3/15/2023  1. No definitive PSMA PET/CT evidence for Pylarify (piflufolastat)-avid metastatic disease. 2. Specifically, there is no abnormal Pylarify (piflufolastat)-avidity in the prostatectomy bed. However, evaluation of the vesicourethral junction is limited secondary to intense accumulation of the excreted radiotracer within the urinary bladder. 3. No Pylarify (piflufolastat)-avid retroperitoneal, pelvic or inguinal lymphadenopathy. Stable appearance of the previously described subcentimeter left external iliac lymph node, exhibiting Pylarify (piflufolastat)-avid below blood pool. 4. No Pylarify (piflufolastat)-avid osseous lesions. 5. Stable subcentimeter pulmonary nodules. 6. Stable subcentimeter hepatic hypodensities. CRITICAL RESULT: No. COMMUNICATION: Per this written report. Dictated by Dell Foote MD on 3/15/2023 4:08 PM Signed by Dell Foote MD on 3/15/2023 4:36 PM            Assessment & Plan   Diagnoses and all orders for this visit:    1. Coronary artery disease involving coronary bypass graft of native heart without angina pectoris  (Primary)  -     ECG 12 Lead  EKG, reviewed and discussed, no acute changes  Continue aspirin and statin  We have discussed ischemic evaluation since that has been sometime since his stents, he remains asymptomatic and declines further work-up at this time.    2. Essential hypertension  Controlled, continue current therapy, routine monitoring recommended, sodium restrictions    3. Mixed hyperlipidemia  Discussed and reviewed recent laboratory testing LDL is at goal, continue Crestor, heart healthy diet, OTC fish oil recommended    4. Type 2 diabetes mellitus without complication, without long-term current use of insulin  Stable, discussed and reviewed labs, continue on metformin and Farxiga, annual eye exam recommended and daily foot exam    5. Environmental allergies  Continue on antihistamine and Singulair.  We discussed he could use other antihistamines however recommend avoidance of decongestants, he can also try nose sprays such as Flonase or Nasonex.    6. Overweight  Lifestyle modifications including heart healthy diet, regular exercise, maintenance of desirable body weight and avoidance of tobacco products.      Discussed preventative health maintenance  Follow-up in 3 months with Dr. Rodney, sooner if needed.

## 2023-06-05 RX ORDER — DAPAGLIFLOZIN 10 MG/1
TABLET, FILM COATED ORAL
Qty: 90 TABLET | Refills: 0 | Status: SHIPPED | OUTPATIENT
Start: 2023-06-05

## 2023-06-15 RX ORDER — LISINOPRIL 10 MG/1
TABLET ORAL
Qty: 180 TABLET | Refills: 0 | Status: SHIPPED | OUTPATIENT
Start: 2023-06-15

## 2023-06-19 RX ORDER — MEMANTINE HYDROCHLORIDE 10 MG/1
TABLET ORAL
Qty: 180 TABLET | Refills: 0 | Status: SHIPPED | OUTPATIENT
Start: 2023-06-19

## 2023-07-29 DIAGNOSIS — I25.10 CORONARY ARTERY DISEASE INVOLVING NATIVE CORONARY ARTERY OF NATIVE HEART WITHOUT ANGINA PECTORIS: ICD-10-CM

## 2023-07-29 DIAGNOSIS — I10 ESSENTIAL HYPERTENSION: ICD-10-CM

## 2023-07-29 DIAGNOSIS — E78.2 MIXED HYPERLIPIDEMIA: ICD-10-CM

## 2023-07-31 RX ORDER — MONTELUKAST SODIUM 10 MG/1
TABLET ORAL
Qty: 90 TABLET | Refills: 0 | Status: SHIPPED | OUTPATIENT
Start: 2023-07-31

## 2023-08-24 ENCOUNTER — TELEPHONE (OUTPATIENT)
Dept: CARDIOLOGY | Facility: CLINIC | Age: 78
End: 2023-08-24

## 2023-08-24 NOTE — TELEPHONE ENCOUNTER
Caller: Rose Singer    Relationship: Self    Best call back number:[phone]    What is the best time to reach you: ANYTIME    Who are you requesting to speak with (clinical staff, provider,  specific staff member): CLINICAL    What was the call regarding: PATIENTS WIFE JENNIFER SINGER CALLED PATIENT HAS COVID. PATIENT NEEDS PAXLOVID CALLED IN. PLEASE REACH OUT TO THE PATIENTS WIFE JENNIFER SINGER.    Is it okay if the provider responds through MyChart:   CALL PLEASE           Physical Therapy  Visit Type: initial evaluation  Treatment diagnosis: Decreased left UE ROM, strength, balance deficits, pain  Precautions:  Medical precautions:  fall risk; standard precautions, airborne precautions and contact precautions.  Covid+  S/p fall with left proximal humeral fracture  Upper Extremity:    Left: sling and non weight bearing        SUBJECTIVE                                                                                                            Patient agreed to participate in therapy this date.  Prior treatment in the past year for same condition: no therapiesPatient has not been hospitalized, in a skilled nursing facility, or seen by home health in the last 30 days.  Patient / Family Goal: maximize function    Pain     Location: left shoulder, did not rate, pain with movement  RN informed on pain level      OBJECTIVE                                                                                                                Oriented to person, place and time     Arousal alertness: appropriate responses to stimuli  Patient activity tolerance: 1 to 1 activity to rest  Range of Motion (measured in degrees unless otherwise noted, active unless indicated)  WFL: LUE RUE except as noted  WFL: RLE, LLE  Comments / Details: Left UE in sling secondary to humeral fracture  Strength (out of 5 unless otherwise indicated)   WFL: LLE, RLE  Balance    Sitting: Static: supervision, Dynamic: supervision single lower extremity support    Standing - Firm Surface - Eyes Open: Static: contact guard/touching/steadying assist Dynamic: contact guard/touching/steadying assist  Balance Details: Utilizes st cane in standing for balance deficits  Bed Mobility:      Supine to sit: supervision and with verbal cues  Training completed:    Tasks: supine to sit    Education details: patient safety, patient verbalizes precautions and patient requires additional training    Cues for sequencing  Transfers:    Assistive  devices: gait belt and straight cane    Sit to stand: with verbal cues, with tactile cues and contact guard/touching/steadying assist    Stand to sit: with tactile cues, with verbal cues and contact guard/touching/steadying assist    Stand pivot: with verbal cues, with tactile cues and contact guard/touching/steadying assist    Toilet: with tactile cues, with verbal cues and contact guard/touching/steadying assist  Training completed:    Tasks: sit to stand, stand to sit, stand pivot and toilet    Education details: patient safety, patient verbalizes precautions, patient able to maintain precautions and patient requires additional training    Cues for sequencing, assist due to generalized weakness, pain  Gait/Ambulation:     Assistance: with verbal cues, with tactile cues and contact guard/touching/steadying assist   Assistive device: straight cane and gait belt    Distance (ft): 25; 45    Pattern: shuffle and step to    Type: decreased kristen  Training Completed:    Tasks: gait training on level surfaces    Education details: patient safety and patient requires additional training    Assist for generalized weakness, pain        Interventions                                                                                                       Training provided: activity tolerance, balance retraining, bed mobility training, functional ambulation, transfer training, safety training and use of adaptive equipment    Skilled input: Verbal instruction/cues  Verbal Consent: Writer verbally educated and received verbal consent for hand placement, positioning of patient, and techniques to be performed today from patient for clothing adjustments for techniques, hand placement and palpation for techniques and therapist position for techniques as described above and how they are pertinent to the patient's plan of care.        ASSESSMENT                                                                                                                 Impairments: range of motion, strength, activity tolerance, balance deficits, endurance and pain  Functional Limitations: all functional mobility  Patient presents to physical therapy below baseline secondary to fall and left proximal humeral fracture. Patient displays decreased left UE ROM/strength, pain, and generalized weakness which are limiting overall mobility. Patient will benefit from subacute rehab or back to [place] if patient can be assisted anytime she is up with home therapy. She is unsafe to be up alone due to history of falls and balance deficits.        Discharge Recommendations  Recommendation for Discharge: PT WI: Sub-acute nursing home, Other (comment)(or back to [place] if patient can be assisted any time she is up, with home therapy)         PT/OT Mobility Equipment for Discharge: cane, patient has own          Skilled therapy is required to address these limitations in attempt to maximize the patient's independence.    Pain at end of session: RN informed on pain level 0/10, location: left shoulder, did not rate, pain with movement    End of Session:   Location: in chair  Safety measures: alarm system in place/re-engaged, call light within reach and lines intact  Handoff to: nurse            PLAN                                                                                                                            Suggestions for next session as indicated: Plan: transfers, gait, increase distance, LE ex's    PT Frequency: 4 days/week  Frequency Comments: 0    Interventions: balance, gait training, ROM, strengthening, patient/family training, bed mobility and endurance training  Agreement to plan and goals: patient agrees with goals and treatment plan        GOALS:  Review Date: 12/9/2020  Long Term Goals: (to be met by time of discharge from hospital)  Sit to supine: Patient will complete sit to supine modified independent.  Status: progressing/ongoing  Supine to sit: Patient will  complete supine to sit modified independent.  Status: progressing/ongoing  Sit to stand: Patient will complete sit to stand transfer with single point cane, modified independent.   Status: progressing/ongoing  Stand to sit: Patient will complete stand to sit transfer with single point cane, modified independent.   Status: progressing/ongoing  Ambulation (even): Patient will ambulate on even surface for 150 feet with single point cane, modified independent.   Status: progressing/ongoing    Patient will perform and/or verbalize proper car transfer. Not metDocumented in the chart in the following areas: Prior Level of Function. Assessment. Patient Education.      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

## 2023-08-24 NOTE — TELEPHONE ENCOUNTER
pt has covid and requesting Paxlovid to help with symptoms pts requested pharmacy is [facility] on [address]

## 2023-08-25 RX ORDER — DONEPEZIL HYDROCHLORIDE 10 MG/1
TABLET, FILM COATED ORAL
Qty: 90 TABLET | Refills: 1 | Status: SHIPPED | OUTPATIENT
Start: 2023-08-25

## 2023-08-28 ENCOUNTER — TELEPHONE (OUTPATIENT)
Dept: CARDIOLOGY | Facility: CLINIC | Age: 78
End: 2023-08-28
Payer: MEDICARE

## 2023-08-28 NOTE — TELEPHONE ENCOUNTER
Patient clled said he picked up his ozempic pen and it had enough stuff for 8 shots but the pharmacy only gave him 6 pen needles and they said said they could not give him any more than that he needed to call his doctor.

## 2023-08-28 NOTE — TELEPHONE ENCOUNTER
Sent in verbal order with pharmacist at [facility].  Increase dosage to 0.5ml weekly.

## 2023-09-08 ENCOUNTER — TELEPHONE (OUTPATIENT)
Dept: CARDIOLOGY | Facility: CLINIC | Age: 78
End: 2023-09-08
Payer: MEDICARE

## 2023-09-08 NOTE — TELEPHONE ENCOUNTER
Called [facility] and given another diagnosis code of E55.9 for Vitamin D Def.  She will resubmit the bill to his insurance.

## 2023-09-08 NOTE — TELEPHONE ENCOUNTER
Caller: Rose Singer    Relationship: Self    Best call back number: [phone]    What is the best time to reach you: ANY    Who are you requesting to speak with (clinical staff, provider,  specific staff member): CLINICAL      What was the call regarding: MYRIAM IS CALLING IN TO HAVE A CORRECTED DX CODE FOR VITAMIN D SHOT. THE CODE USED WAS , WHICH IS THE INCORRECT CODE SENT TO [facility] AND NEEDS TO BE CHANGED FOR PROPER BILLING PURPOSES.    MYRIAM RETURN NUMBER: [phone]  [facility] [phone]

## 2023-09-11 RX ORDER — LISINOPRIL 10 MG/1
TABLET ORAL
Qty: 180 TABLET | Refills: 0 | Status: SHIPPED | OUTPATIENT
Start: 2023-09-11

## 2023-09-11 RX ORDER — METFORMIN HYDROCHLORIDE 500 MG/1
TABLET, EXTENDED RELEASE ORAL
Qty: 180 TABLET | Refills: 2 | Status: SHIPPED | OUTPATIENT
Start: 2023-09-11

## 2023-09-13 ENCOUNTER — TELEPHONE (OUTPATIENT)
Dept: CARDIOLOGY | Facility: CLINIC | Age: 78
End: 2023-09-13
Payer: MEDICARE

## 2023-09-14 RX ORDER — MEMANTINE HYDROCHLORIDE 10 MG/1
TABLET ORAL
Qty: 180 TABLET | Refills: 0 | Status: SHIPPED | OUTPATIENT
Start: 2023-09-14

## 2023-09-25 ENCOUNTER — OFFICE VISIT (OUTPATIENT)
Dept: CARDIOLOGY | Facility: CLINIC | Age: 78
End: 2023-09-25
Payer: MEDICARE

## 2023-09-25 VITALS
HEIGHT: 71 IN | SYSTOLIC BLOOD PRESSURE: 130 MMHG | DIASTOLIC BLOOD PRESSURE: 78 MMHG | HEART RATE: 60 BPM | WEIGHT: 207 LBS | OXYGEN SATURATION: 99 % | BODY MASS INDEX: 28.98 KG/M2

## 2023-09-25 DIAGNOSIS — I10 ESSENTIAL HYPERTENSION: ICD-10-CM

## 2023-09-25 DIAGNOSIS — I25.10 CORONARY ARTERY DISEASE INVOLVING NATIVE CORONARY ARTERY OF NATIVE HEART WITHOUT ANGINA PECTORIS: Primary | ICD-10-CM

## 2023-09-25 DIAGNOSIS — E78.2 MIXED HYPERLIPIDEMIA: ICD-10-CM

## 2023-09-25 DIAGNOSIS — E11.9 TYPE 2 DIABETES MELLITUS WITHOUT COMPLICATION, WITHOUT LONG-TERM CURRENT USE OF INSULIN: ICD-10-CM

## 2023-09-25 NOTE — PROGRESS NOTES
subjective     Chief Complaint   Patient presents with    Coronary Artery Disease     Follow up    Diabetes Mellitus     Follow up     Patient Active Problem List   Diagnosis    CAD (coronary artery disease) status post myocardial infarction and PCI to the RCA in 2005    Diabetes mellitus    Mixed hyperlipidemia    Depression    Essential hypertension    Obesity    Prostate CA    Memory loss    Environmental allergies    Vitamin D deficiency    Shortness of breath    Simple chronic bronchitis    Multiple pulmonary nodules    Tick bite of left thigh   Problems addressed this visit  1. Coronary artery disease involving native coronary artery of native heart without angina pectoris    2. Essential hypertension    3. Mixed hyperlipidemia    4. Type 2 diabetes mellitus without complication, without long-term current use of insulin        History of Present Illness    Patient is 78 years old white male who is here for follow-up.  Patient has known coronary artery disease status post myocardial infarction and PCI to RCA in 2005.  He denies any chest pain palpitations or shortness of breath.  He is taking Crestor and aspirin.  He is not taking any beta-blocker therapy because of bradycardia.  He is asymptomatic denies any chest pain palpitations or shortness of breath.    Hypertension  Blood pressure is very well controlled he is taking lisinopril 20 mg daily.  No drug side effects.    Hyperlipidemia  He is taking Crestor regularly and is trying to lose weight.  As [id number] he is now taking Ozempic and has lost some weight.  He is diabetic.  Metformin was discontinued.        Past Surgical History:   Procedure Laterality Date    CARDIAC CATHETERIZATION  08/2010    CARDIOVASCULAR STRESS TEST  11/2014    CARDIOVASCULAR STRESS TEST  11/2014    COLONOSCOPY  09/01/2014    COLONOSCOPY  09/2014    CORONARY STENT PLACEMENT  2005    CORONARY STENT PLACEMENT  2010    ECHO - CONVERTED  08/2011    OTHER SURGICAL HISTORY      Cath  "Stent Placement  and .    PROSTATECTOMY      Radical     Family History   Problem Relation Age of Onset    Alzheimer's disease Mother     No Known Problems Brother     Coronary artery disease Other     Cancer Other         Colon     Past Medical History:   Diagnosis Date    Anxiety     Asthma     CAD (coronary artery disease)     Caffeine use     2 Cups Daily.     COPD (chronic obstructive pulmonary disease)     Depression     Diabetes mellitus     Hyperlipidemia     Hypertension     Myocardial infarction     Obesity     Prostate cancer          Social History     Tobacco Use    Smoking status: Former     Types: Cigarettes     Quit date: 1991     Years since quittin.3    Smokeless tobacco: Never   Vaping Use    Vaping Use: Never used   Substance Use Topics    Alcohol use: No    Drug use: No       Allergies   Allergen Reactions    Penicillins Other (See Comments)     Yrs ago went \"out of it\"      Sulfa Antibiotics Hives       Current Outpatient Medications on File Prior to Visit   Medication Sig    albuterol sulfate  (90 Base) MCG/ACT inhaler Inhale 2 puffs Every 4 (Four) Hours As Needed for Wheezing.    aspirin 325 MG tablet Take 1 tablet by mouth Daily.    Blood Glucose Monitoring Suppl (Blood Glucose Monitor System) w/Device kit 1 each Daily.    cholecalciferol (VITAMIN D3) 1000 UNITS tablet Take 1 tablet by mouth Daily.    donepezil (ARICEPT) 10 MG tablet TAKE ONE TABLET BY MOUTH ONCE NIGHTLY    glucose blood test strip 1 each by Other route As Needed (prn). Use as instructed    imipramine (TOFRANIL) 50 MG tablet Take 1 tablet by mouth Every Night.    Lancets misc 1 stick Daily.    lisinopril (PRINIVIL,ZESTRIL) 10 MG tablet TAKE 1 TABLET BY MOUTH TWICE A DAY    memantine (NAMENDA) 10 MG tablet TAKE 1 TABLET BY MOUTH TWICE A DAY    metFORMIN ER (GLUCOPHAGE-XR) 500 MG 24 hr tablet TAKE TWO TABLETS BY MOUTH DAILY WITH BREAKFAST    montelukast (SINGULAIR) 10 MG tablet TAKE ONE TABLET BY MOUTH " "EVERY NIGHT AT BEDTIME    Nirmatrelvir&Ritonavir 300/100 (PAXLOVID) 20 x 150 MG & 10 x 100MG tablet therapy pack tablet Take 3 tablets by mouth 2 (Two) Times a Day. Indications: COVID-19 Confirmed Infection    nitroglycerin (NITROSTAT) 0.4 MG SL tablet 1 under the tongue as needed for angina, may repeat q5mins for up three doses    Omeprazole Magnesium 10 MG pack Take 20 mg by mouth At Night As Needed (as needed for heartburn).    rosuvastatin (CRESTOR) 20 MG tablet Take 1 tablet by mouth Daily.    Semaglutide,0.25 or 0.5MG/DOS, (OZEMPIC) 2 MG/1.5ML solution pen-injector Inject 0.5 mg under the skin into the appropriate area as directed 1 (One) Time Per Week.    tiotropium bromide-olodaterol (STIOLTO RESPIMAT) 2.5-2.5 MCG/ACT aerosol solution inhaler Inhale 2 puffs Daily.    vitamin B-12 (CYANOCOBALAMIN) 1000 MCG tablet Take 1 tablet by mouth Daily.    [DISCONTINUED] linaclotide (LINZESS) 72 MCG capsule capsule Take 1 capsule by mouth Every Morning Before Breakfast.     No current facility-administered medications on file prior to visit.         The following portions of the patient's history were reviewed and updated as appropriate: allergies, current medications, past family history, past medical history, past social history, past surgical history and problem list.    Review of Systems   Constitutional: Negative.   HENT: Negative.  Negative for congestion.    Eyes: Negative.    Cardiovascular: Negative.  Negative for chest pain, cyanosis, dyspnea on exertion, irregular heartbeat, leg swelling, near-syncope, orthopnea, palpitations, paroxysmal nocturnal dyspnea and syncope.   Respiratory: Negative.  Negative for shortness of breath.    Hematologic/Lymphatic: Negative.    Musculoskeletal: Negative.    Gastrointestinal: Negative.    Neurological: Negative.  Negative for headaches.        Objective:     /78 (BP Location: Left arm, Patient Position: Sitting, Cuff Size: Adult)   Pulse 60   Ht 180.3 cm (71\")   " Wt 93.9 kg (207 lb)   SpO2 99%   BMI 28.87 kg/m²   Pulmonary:      Effort: Pulmonary effort is normal.      Breath sounds: Normal breath sounds. No stridor. No wheezing. No rhonchi. No rales.   Cardiovascular:      PMI at left midclavicular line. Normal rate. Regular rhythm. Normal S1. Normal S2.       Murmurs: There is no murmur.      No gallop.  No click. No rub.   Pulses:     Intact distal pulses.   Edema:     Peripheral edema absent.         Lab Review                  Procedures       I personally viewed and interpreted the patient's LAB data         Assessment:     1. Coronary artery disease involving native coronary artery of native heart without angina pectoris    2. Essential hypertension    3. Mixed hyperlipidemia    4. Type 2 diabetes mellitus without complication, without long-term current use of insulin          Plan:     Coronary artery disease status post myocardial infarction and PCI to the RCA 2005.  Patient is totally asymptomatic and denies any chest pain palpitations or shortness of breath.  He will continue antiplatelet therapy and statin therapy.  He could not take beta-blocker therapy because of bradycardia.    Aggressive risk factor modification emphasized.  Patient is trying to lose weight.    Hypertension is very well controlled he will continue lisinopril 20 mg daily.    Hyperlipidemia controlled with Crestor last LDL was 70.    Diabetes mellitus  Metformin was discontinued patient is now taking Ozempic and is doing very well.  Follow-up scheduled        No follow-ups on file.

## 2023-10-09 ENCOUNTER — TELEPHONE (OUTPATIENT)
Dept: CARDIOLOGY | Facility: CLINIC | Age: 78
End: 2023-10-09
Payer: MEDICARE

## 2023-10-09 NOTE — TELEPHONE ENCOUNTER
[facility] pharmacy called stating patient stating patient said his ozempic was supposed to be increased at LOV but there was no mention of this in not-please advise and sen in RX for appropriate dose

## 2023-10-12 ENCOUNTER — TELEPHONE (OUTPATIENT)
Dept: CARDIOLOGY | Facility: CLINIC | Age: 78
End: 2023-10-12
Payer: MEDICARE

## 2023-10-12 NOTE — TELEPHONE ENCOUNTER
Please call to [facility] pharmacy [facility] [address]    Ozempic month supply

## 2023-10-27 DIAGNOSIS — I25.10 CORONARY ARTERY DISEASE INVOLVING NATIVE CORONARY ARTERY OF NATIVE HEART WITHOUT ANGINA PECTORIS: ICD-10-CM

## 2023-10-27 DIAGNOSIS — I10 ESSENTIAL HYPERTENSION: ICD-10-CM

## 2023-10-27 DIAGNOSIS — E78.2 MIXED HYPERLIPIDEMIA: ICD-10-CM

## 2023-10-27 RX ORDER — MONTELUKAST SODIUM 10 MG/1
TABLET ORAL
Qty: 90 TABLET | Refills: 0 | Status: SHIPPED | OUTPATIENT
Start: 2023-10-27

## 2023-11-02 RX ORDER — SEMAGLUTIDE 0.68 MG/ML
INJECTION, SOLUTION SUBCUTANEOUS
Qty: 3 ML | OUTPATIENT
Start: 2023-11-02

## 2023-11-07 ENCOUNTER — TELEPHONE (OUTPATIENT)
Dept: CARDIOLOGY | Facility: CLINIC | Age: 78
End: 2023-11-07
Payer: MEDICARE

## 2023-11-07 DIAGNOSIS — E11.9 TYPE 2 DIABETES MELLITUS WITHOUT COMPLICATION, WITHOUT LONG-TERM CURRENT USE OF INSULIN: Primary | ICD-10-CM

## 2023-11-07 NOTE — TELEPHONE ENCOUNTER
CALLED AND LEFT A DETAILED VOICEMAIL PER DR VILLALOBOS/                Adryan villalobos MD  You4 hours ago (1:27 PM)       Stop Ozempic and try Trulicity 0.75 mg subcu weekly   cC

## 2023-11-13 RX ORDER — METFORMIN HYDROCHLORIDE 500 MG/1
1000 TABLET, EXTENDED RELEASE ORAL
Qty: 180 TABLET | Refills: 0 | Status: SHIPPED | OUTPATIENT
Start: 2023-11-13

## 2023-11-13 NOTE — TELEPHONE ENCOUNTER
Pt is due his ozempic shot today pt stated he could not get it because he can not afford to pay for it. Pt requesting to be put back on Metformin and farxiga for his sugar pt stated his sugar has been good 110 this am.

## 2023-11-27 ENCOUNTER — TELEPHONE (OUTPATIENT)
Dept: CARDIOLOGY | Facility: CLINIC | Age: 78
End: 2023-11-27
Payer: MEDICARE

## 2023-11-27 RX ORDER — AZITHROMYCIN 250 MG/1
250 TABLET, FILM COATED ORAL DAILY
Qty: 6 TABLET | Refills: 0 | Status: SHIPPED | OUTPATIENT
Start: 2023-11-27

## 2023-11-27 NOTE — TELEPHONE ENCOUNTER
Pt called, had his covid booster last week and tested for covid yest. Negative,      He is c/o sore throat earache x 1 week, throat sore on same side as sore ear.  Mild cough no fever.  Wanting to know if he can be seen or call in something.    Taking otc sinus tylenol and no relief of ear pain.      Allergic to penicillin and sulfa

## 2023-12-11 RX ORDER — MEMANTINE HYDROCHLORIDE 10 MG/1
TABLET ORAL
Qty: 180 TABLET | Refills: 0 | Status: SHIPPED | OUTPATIENT
Start: 2023-12-11 | End: 2023-12-18 | Stop reason: SDUPTHER

## 2023-12-11 RX ORDER — LISINOPRIL 10 MG/1
TABLET ORAL
Qty: 180 TABLET | Refills: 0 | Status: SHIPPED | OUTPATIENT
Start: 2023-12-11 | End: 2023-12-18 | Stop reason: SDUPTHER

## 2023-12-18 ENCOUNTER — OFFICE VISIT (OUTPATIENT)
Dept: CARDIOLOGY | Facility: CLINIC | Age: 78
End: 2023-12-18
Payer: MEDICARE

## 2023-12-18 VITALS
HEIGHT: 71 IN | HEART RATE: 68 BPM | SYSTOLIC BLOOD PRESSURE: 149 MMHG | OXYGEN SATURATION: 98 % | WEIGHT: 195 LBS | DIASTOLIC BLOOD PRESSURE: 80 MMHG | BODY MASS INDEX: 27.3 KG/M2

## 2023-12-18 DIAGNOSIS — E78.2 MIXED HYPERLIPIDEMIA: ICD-10-CM

## 2023-12-18 DIAGNOSIS — I10 ESSENTIAL HYPERTENSION: ICD-10-CM

## 2023-12-18 DIAGNOSIS — I25.10 CORONARY ARTERY DISEASE INVOLVING NATIVE CORONARY ARTERY OF NATIVE HEART WITHOUT ANGINA PECTORIS: Primary | ICD-10-CM

## 2023-12-18 DIAGNOSIS — R41.3 MEMORY LOSS: ICD-10-CM

## 2023-12-18 DIAGNOSIS — E11.9 TYPE 2 DIABETES MELLITUS WITHOUT COMPLICATION, WITHOUT LONG-TERM CURRENT USE OF INSULIN: ICD-10-CM

## 2023-12-18 RX ORDER — SEMAGLUTIDE 0.68 MG/ML
0.25 INJECTION, SOLUTION SUBCUTANEOUS WEEKLY
COMMUNITY
Start: 2023-07-24

## 2023-12-18 RX ORDER — LISINOPRIL 10 MG/1
10 TABLET ORAL 2 TIMES DAILY
Qty: 180 TABLET | Refills: 0 | Status: SHIPPED | OUTPATIENT
Start: 2023-12-18

## 2023-12-18 RX ORDER — MEMANTINE HYDROCHLORIDE 10 MG/1
10 TABLET ORAL 2 TIMES DAILY
Qty: 180 TABLET | Refills: 1 | Status: SHIPPED | OUTPATIENT
Start: 2023-12-18

## 2023-12-18 NOTE — PROGRESS NOTES
subjective     Chief Complaint   Patient presents with    Results     Labs      Hypertension     today    Med Refill     pending       Problems Addressed This Visit  1. Coronary artery disease involving native coronary artery of native heart without angina pectoris    2. Mixed hyperlipidemia    3. Essential hypertension    4. Type 2 diabetes mellitus without complication, without long-term current use of insulin    5. Memory loss        History of Present Illness    Patient is 78 years old white male who is here for follow-up.  He has known coronary artery disease status post myocardial infarction and PCI to the RCA.  He denies any chest pain palpitations or shortness of breath.  He is taking medications regularly.    Hyperlipidemia  Patient is trying to lose weight and is taking Crestor 20 mg daily.  No drug side effects.    Diabetes mellitus type 2 he is taking metformin and dapagliflozin.  He was taking Ozempic however he ran out of it a few months ago because of lack of availability.  Will send a prescription again.    History of prostrate CA doing very well.  Memory loss on Namenda and Aricept.  No drug side effects.      Past Surgical History:   Procedure Laterality Date    CARDIAC CATHETERIZATION  08/2010    CARDIOVASCULAR STRESS TEST  11/2014    CARDIOVASCULAR STRESS TEST  11/2014    COLONOSCOPY  09/01/2014    COLONOSCOPY  09/2014    CORONARY STENT PLACEMENT  2005    CORONARY STENT PLACEMENT  2010    ECHO - CONVERTED  08/2011    OTHER SURGICAL HISTORY      Cath Stent Placement 2005 and 2010.    PROSTATECTOMY      Radical     Family History   Problem Relation Age of Onset    Alzheimer's disease Mother     No Known Problems Brother     Coronary artery disease Other     Cancer Other         Colon     Past Medical History:   Diagnosis Date    Anxiety     Asthma     CAD (coronary artery disease)     Caffeine use     2 Cups Daily.     COPD (chronic obstructive pulmonary disease)     Depression     Diabetes  "mellitus     Hyperlipidemia     Hypertension     Myocardial infarction     Obesity     Prostate cancer      Patient Active Problem List   Diagnosis    CAD (coronary artery disease) status post myocardial infarction and PCI to the RCA in 2005    Diabetes mellitus    Mixed hyperlipidemia    Depression    Essential hypertension    Obesity    Prostate CA    Memory loss    Environmental allergies    Vitamin D deficiency    Shortness of breath    Simple chronic bronchitis    Multiple pulmonary nodules    Tick bite of left thigh       Social History     Tobacco Use    Smoking status: Former     Types: Cigarettes     Quit date: 1991     Years since quittin.6    Smokeless tobacco: Never   Vaping Use    Vaping Use: Never used   Substance Use Topics    Alcohol use: No    Drug use: No       Allergies   Allergen Reactions    Penicillins Other (See Comments)     Yrs ago went \"out of it\"      Sulfa Antibiotics Hives       Current Outpatient Medications on File Prior to Visit   Medication Sig    albuterol sulfate  (90 Base) MCG/ACT inhaler Inhale 2 puffs Every 4 (Four) Hours As Needed for Wheezing.    aspirin 325 MG tablet Take 1 tablet by mouth Daily.    Blood Glucose Monitoring Suppl (Blood Glucose Monitor System) w/Device kit 1 each Daily.    cholecalciferol (VITAMIN D3) 1000 UNITS tablet Take 1 tablet by mouth Daily.    dapagliflozin Propanediol 10 MG tablet Take 10 mg by mouth Daily.    donepezil (ARICEPT) 10 MG tablet TAKE ONE TABLET BY MOUTH ONCE NIGHTLY    glucose blood test strip 1 each by Other route As Needed (prn). Use as instructed    imipramine (TOFRANIL) 50 MG tablet Take 1 tablet by mouth Every Night.    Lancets misc 1 stick Daily.    metFORMIN ER (GLUCOPHAGE-XR) 500 MG 24 hr tablet Take 2 tablets by mouth Daily With Breakfast.    montelukast (SINGULAIR) 10 MG tablet TAKE ONE TABLET BY MOUTH EVERY NIGHT AT BEDTIME    nitroglycerin (NITROSTAT) 0.4 MG SL tablet 1 under the tongue as needed for angina, " "may repeat q5mins for up three doses    Omeprazole Magnesium 10 MG pack Take 20 mg by mouth At Night As Needed (as needed for heartburn).    relugolix (ORGOVYX) 120 MG tablet tablet Take 1 tablet by mouth Daily.    rosuvastatin (CRESTOR) 20 MG tablet Take 1 tablet by mouth Daily.    tiotropium bromide-olodaterol (STIOLTO RESPIMAT) 2.5-2.5 MCG/ACT aerosol solution inhaler Inhale 2 puffs Daily.    vitamin B-12 (CYANOCOBALAMIN) 1000 MCG tablet Take 1 tablet by mouth Daily.    [DISCONTINUED] Dulaglutide 0.75 MG/0.5ML solution pen-injector Inject 0.75 mg under the skin into the appropriate area as directed 1 (One) Time Per Week.    [DISCONTINUED] lisinopril (PRINIVIL,ZESTRIL) 10 MG tablet TAKE 1 TABLET BY MOUTH TWICE A DAY    [DISCONTINUED] memantine (NAMENDA) 10 MG tablet TAKE 1 TABLET BY MOUTH TWICE A DAY    [DISCONTINUED] azithromycin (Zithromax) 250 MG tablet Take 1 tablet by mouth Daily. 2 tablet now and 1 daily     No current facility-administered medications on file prior to visit.         The following portions of the patient's history were reviewed and updated as appropriate: allergies, current medications, past family history, past medical history, past social history, past surgical history and problem list.    Review of Systems   Constitutional: Negative.   HENT: Negative.  Negative for congestion.    Eyes: Negative.    Cardiovascular: Negative.  Negative for chest pain, cyanosis, dyspnea on exertion, irregular heartbeat, leg swelling, near-syncope, orthopnea, palpitations, paroxysmal nocturnal dyspnea and syncope.   Respiratory: Negative.  Negative for shortness of breath.    Hematologic/Lymphatic: Negative.    Musculoskeletal: Negative.    Gastrointestinal: Negative.    Neurological: Negative.  Negative for headaches.          Objective:     /80 (BP Location: Left arm, Patient Position: Sitting, Cuff Size: Adult)   Pulse 68   Ht 180.3 cm (71\")   Wt 88.5 kg (195 lb)   SpO2 98%   BMI 27.20 kg/m² "   Pulmonary:      Effort: Pulmonary effort is normal.      Breath sounds: Normal breath sounds. No stridor. No wheezing. No rhonchi. No rales.   Cardiovascular:      PMI at left midclavicular line. Normal rate. Regular rhythm. Normal S1. Normal S2.       Murmurs: There is no murmur.      No gallop.  No click. No rub.   Pulses:     Intact distal pulses.   Edema:     Peripheral edema absent.           Lab Review        Procedures       I personally viewed and interpreted the patient's LAB data         Assessment:     1. Coronary artery disease involving native coronary artery of native heart without angina pectoris    2. Mixed hyperlipidemia    3. Essential hypertension    4. Type 2 diabetes mellitus without complication, without long-term current use of insulin    5. Memory loss          Plan:     Coronary artery disease status post MI and PCI.  Patient is doing very well he was advised to continue aspirin and Crestor.  He is not taking any beta-blocker therapy.  Because of bradycardia with metoprolol in the past.    Hyperlipidemia  Patient will continue Crestor    Diabetes mellitus  Hemoglobin A1c is 5.7 which sugar is 160.  He was advised to restart Ozempic and discontinue metformin.    Hypertension  Blood pressure is mildly elevated today.  Patient will keep a record of his blood pressure currently he is taking lisinopril 10 mg twice daily.  Dose will need to be adjusted depending on blood pressure readings.    Healthy lifestyle and weight loss emphasized.  Follow-up scheduled          No follow-ups on file.

## 2024-01-15 ENCOUNTER — APPOINTMENT (OUTPATIENT)
Dept: GENERAL RADIOLOGY | Facility: HOSPITAL | Age: 79
End: 2024-01-15
Payer: MEDICARE

## 2024-01-15 ENCOUNTER — HOSPITAL ENCOUNTER (EMERGENCY)
Facility: HOSPITAL | Age: 79
Discharge: HOME | End: 2024-01-15
Attending: STUDENT IN AN ORGANIZED HEALTH CARE EDUCATION/TRAINING PROGRAM | Admitting: STUDENT IN AN ORGANIZED HEALTH CARE EDUCATION/TRAINING PROGRAM
Payer: MEDICARE

## 2024-01-15 VITALS
HEART RATE: 62 BPM | RESPIRATION RATE: 18 BRPM | HEIGHT: 71 IN | SYSTOLIC BLOOD PRESSURE: 138 MMHG | TEMPERATURE: 97.4 F | OXYGEN SATURATION: 98 % | DIASTOLIC BLOOD PRESSURE: 71 MMHG | WEIGHT: 190 LBS | BODY MASS INDEX: 26.6 KG/M2

## 2024-01-15 DIAGNOSIS — M54.9 MUSCULOSKELETAL BACK PAIN: Primary | ICD-10-CM

## 2024-01-15 DIAGNOSIS — C61 PROSTATE CANCER: ICD-10-CM

## 2024-01-15 LAB
ALBUMIN SERPL-MCNC: 4.5 G/DL (ref 3.5–5.2)
ALBUMIN/GLOB SERPL: 1.9 G/DL
ALP SERPL-CCNC: 58 U/L (ref 39–117)
ALT SERPL W P-5'-P-CCNC: 16 U/L (ref 1–41)
ANION GAP SERPL CALCULATED.3IONS-SCNC: 6.3 MMOL/L (ref 5–15)
AST SERPL-CCNC: 18 U/L (ref 1–40)
BASOPHILS # BLD AUTO: 0.03 10*3/MM3 (ref 0–0.2)
BASOPHILS NFR BLD AUTO: 0.4 % (ref 0–1.5)
BILIRUB SERPL-MCNC: 0.4 MG/DL (ref 0–1.2)
BUN SERPL-MCNC: 14 MG/DL (ref 8–23)
BUN/CREAT SERPL: 12 (ref 7–25)
CALCIUM SPEC-SCNC: 11 MG/DL (ref 8.6–10.5)
CHLORIDE SERPL-SCNC: 107 MMOL/L (ref 98–107)
CK SERPL-CCNC: 53 U/L (ref 20–200)
CO2 SERPL-SCNC: 29.7 MMOL/L (ref 22–29)
CREAT SERPL-MCNC: 1.17 MG/DL (ref 0.76–1.27)
D DIMER PPP FEU-MCNC: <0.27 MCGFEU/ML (ref 0–0.76)
D-LACTATE SERPL-SCNC: 1.8 MMOL/L (ref 0.5–2)
DEPRECATED RDW RBC AUTO: 43.6 FL (ref 37–54)
EGFRCR SERPLBLD CKD-EPI 2021: 64.6 ML/MIN/1.73
EOSINOPHIL # BLD AUTO: 0.12 10*3/MM3 (ref 0–0.4)
EOSINOPHIL NFR BLD AUTO: 1.6 % (ref 0.3–6.2)
ERYTHROCYTE [DISTWIDTH] IN BLOOD BY AUTOMATED COUNT: 13.2 % (ref 12.3–15.4)
ETHANOL BLD-MCNC: <10 MG/DL (ref 0–10)
ETHANOL UR QL: <0.01 %
GLOBULIN UR ELPH-MCNC: 2.4 GM/DL
GLUCOSE SERPL-MCNC: 124 MG/DL (ref 65–99)
HCT VFR BLD AUTO: 44.8 % (ref 37.5–51)
HGB BLD-MCNC: 15.3 G/DL (ref 13–17.7)
HOLD SPECIMEN: NORMAL
HOLD SPECIMEN: NORMAL
IMM GRANULOCYTES # BLD AUTO: 0.02 10*3/MM3 (ref 0–0.05)
IMM GRANULOCYTES NFR BLD AUTO: 0.3 % (ref 0–0.5)
INR PPP: 0.96 (ref 0.9–1.1)
LYMPHOCYTES # BLD AUTO: 1.58 10*3/MM3 (ref 0.7–3.1)
LYMPHOCYTES NFR BLD AUTO: 20.8 % (ref 19.6–45.3)
MCH RBC QN AUTO: 30.8 PG (ref 26.6–33)
MCHC RBC AUTO-ENTMCNC: 34.2 G/DL (ref 31.5–35.7)
MCV RBC AUTO: 90.3 FL (ref 79–97)
MONOCYTES # BLD AUTO: 0.53 10*3/MM3 (ref 0.1–0.9)
MONOCYTES NFR BLD AUTO: 7 % (ref 5–12)
NEUTROPHILS NFR BLD AUTO: 5.33 10*3/MM3 (ref 1.7–7)
NEUTROPHILS NFR BLD AUTO: 69.9 % (ref 42.7–76)
NRBC BLD AUTO-RTO: 0 /100 WBC (ref 0–0.2)
PLATELET # BLD AUTO: 184 10*3/MM3 (ref 140–450)
PMV BLD AUTO: 10.3 FL (ref 6–12)
POTASSIUM SERPL-SCNC: 5.2 MMOL/L (ref 3.5–5.2)
PROT SERPL-MCNC: 6.9 G/DL (ref 6–8.5)
PROTHROMBIN TIME: 13.3 SECONDS (ref 12.1–14.7)
RBC # BLD AUTO: 4.96 10*6/MM3 (ref 4.14–5.8)
SODIUM SERPL-SCNC: 143 MMOL/L (ref 136–145)
TROPONIN T SERPL HS-MCNC: 13 NG/L
TSH SERPL DL<=0.05 MIU/L-ACNC: 1.8 UIU/ML (ref 0.27–4.2)
WBC NRBC COR # BLD AUTO: 7.61 10*3/MM3 (ref 3.4–10.8)
WHOLE BLOOD HOLD COAG: NORMAL
WHOLE BLOOD HOLD SPECIMEN: NORMAL

## 2024-01-15 PROCEDURE — 25810000003 SODIUM CHLORIDE 0.9 % SOLUTION: Performed by: STUDENT IN AN ORGANIZED HEALTH CARE EDUCATION/TRAINING PROGRAM

## 2024-01-15 PROCEDURE — 93005 ELECTROCARDIOGRAM TRACING: CPT | Performed by: STUDENT IN AN ORGANIZED HEALTH CARE EDUCATION/TRAINING PROGRAM

## 2024-01-15 PROCEDURE — 84443 ASSAY THYROID STIM HORMONE: CPT | Performed by: STUDENT IN AN ORGANIZED HEALTH CARE EDUCATION/TRAINING PROGRAM

## 2024-01-15 PROCEDURE — 82550 ASSAY OF CK (CPK): CPT | Performed by: STUDENT IN AN ORGANIZED HEALTH CARE EDUCATION/TRAINING PROGRAM

## 2024-01-15 PROCEDURE — 72050 X-RAY EXAM NECK SPINE 4/5VWS: CPT

## 2024-01-15 PROCEDURE — 85025 COMPLETE CBC W/AUTO DIFF WBC: CPT | Performed by: STUDENT IN AN ORGANIZED HEALTH CARE EDUCATION/TRAINING PROGRAM

## 2024-01-15 PROCEDURE — 85379 FIBRIN DEGRADATION QUANT: CPT | Performed by: STUDENT IN AN ORGANIZED HEALTH CARE EDUCATION/TRAINING PROGRAM

## 2024-01-15 PROCEDURE — 82077 ASSAY SPEC XCP UR&BREATH IA: CPT | Performed by: STUDENT IN AN ORGANIZED HEALTH CARE EDUCATION/TRAINING PROGRAM

## 2024-01-15 PROCEDURE — 99284 EMERGENCY DEPT VISIT MOD MDM: CPT

## 2024-01-15 PROCEDURE — 83605 ASSAY OF LACTIC ACID: CPT | Performed by: STUDENT IN AN ORGANIZED HEALTH CARE EDUCATION/TRAINING PROGRAM

## 2024-01-15 PROCEDURE — 25010000002 DEXAMETHASONE SODIUM PHOSPHATE 10 MG/ML SOLUTION: Performed by: STUDENT IN AN ORGANIZED HEALTH CARE EDUCATION/TRAINING PROGRAM

## 2024-01-15 PROCEDURE — 72050 X-RAY EXAM NECK SPINE 4/5VWS: CPT | Performed by: RADIOLOGY

## 2024-01-15 PROCEDURE — 72072 X-RAY EXAM THORAC SPINE 3VWS: CPT | Performed by: RADIOLOGY

## 2024-01-15 PROCEDURE — 96374 THER/PROPH/DIAG INJ IV PUSH: CPT

## 2024-01-15 PROCEDURE — 80053 COMPREHEN METABOLIC PANEL: CPT | Performed by: STUDENT IN AN ORGANIZED HEALTH CARE EDUCATION/TRAINING PROGRAM

## 2024-01-15 PROCEDURE — 72072 X-RAY EXAM THORAC SPINE 3VWS: CPT

## 2024-01-15 PROCEDURE — 71045 X-RAY EXAM CHEST 1 VIEW: CPT | Performed by: RADIOLOGY

## 2024-01-15 PROCEDURE — 85610 PROTHROMBIN TIME: CPT | Performed by: STUDENT IN AN ORGANIZED HEALTH CARE EDUCATION/TRAINING PROGRAM

## 2024-01-15 PROCEDURE — 71045 X-RAY EXAM CHEST 1 VIEW: CPT

## 2024-01-15 PROCEDURE — 84484 ASSAY OF TROPONIN QUANT: CPT | Performed by: STUDENT IN AN ORGANIZED HEALTH CARE EDUCATION/TRAINING PROGRAM

## 2024-01-15 RX ORDER — ACETAMINOPHEN 500 MG
1000 TABLET ORAL 3 TIMES DAILY
Qty: 60 TABLET | Refills: 0 | Status: SHIPPED | OUTPATIENT
Start: 2024-01-15 | End: 2024-01-15 | Stop reason: SDUPTHER

## 2024-01-15 RX ORDER — ACETAMINOPHEN 500 MG
1000 TABLET ORAL 3 TIMES DAILY
Qty: 60 TABLET | Refills: 0 | Status: SHIPPED | OUTPATIENT
Start: 2024-01-15 | End: 2024-01-25

## 2024-01-15 RX ORDER — CYCLOBENZAPRINE HCL 10 MG
10 TABLET ORAL ONCE
Status: COMPLETED | OUTPATIENT
Start: 2024-01-15 | End: 2024-01-15

## 2024-01-15 RX ORDER — PREDNISONE 20 MG/1
40 TABLET ORAL DAILY
Qty: 10 TABLET | Refills: 0 | Status: SHIPPED | OUTPATIENT
Start: 2024-01-15 | End: 2024-01-20

## 2024-01-15 RX ORDER — PREDNISONE 20 MG/1
40 TABLET ORAL DAILY
Qty: 10 TABLET | Refills: 0 | Status: SHIPPED | OUTPATIENT
Start: 2024-01-15 | End: 2024-01-15 | Stop reason: SDUPTHER

## 2024-01-15 RX ORDER — CYCLOBENZAPRINE HCL 10 MG
10 TABLET ORAL 3 TIMES DAILY PRN
Qty: 30 TABLET | Refills: 0 | Status: SHIPPED | OUTPATIENT
Start: 2024-01-15 | End: 2024-01-15 | Stop reason: SDUPTHER

## 2024-01-15 RX ORDER — CYCLOBENZAPRINE HCL 10 MG
10 TABLET ORAL 3 TIMES DAILY PRN
Qty: 30 TABLET | Refills: 0 | Status: SHIPPED | OUTPATIENT
Start: 2024-01-15

## 2024-01-15 RX ORDER — DEXAMETHASONE SODIUM PHOSPHATE 10 MG/ML
10 INJECTION, SOLUTION INTRAMUSCULAR; INTRAVENOUS ONCE
Status: COMPLETED | OUTPATIENT
Start: 2024-01-15 | End: 2024-01-15

## 2024-01-15 RX ORDER — SODIUM CHLORIDE 0.9 % (FLUSH) 0.9 %
10 SYRINGE (ML) INJECTION AS NEEDED
Status: DISCONTINUED | OUTPATIENT
Start: 2024-01-15 | End: 2024-01-15 | Stop reason: HOSPADM

## 2024-01-15 RX ADMIN — DEXAMETHASONE SODIUM PHOSPHATE 10 MG: 10 INJECTION INTRAMUSCULAR; INTRAVENOUS at 16:24

## 2024-01-15 RX ADMIN — CYCLOBENZAPRINE 10 MG: 10 TABLET, FILM COATED ORAL at 16:24

## 2024-01-15 RX ADMIN — SODIUM CHLORIDE 1000 ML: 9 INJECTION, SOLUTION INTRAVENOUS at 16:24

## 2024-01-15 NOTE — ED PROVIDER NOTES
"Subjective   History of Present Illness  Patient is a 78-year-old male with history significant for CAD, hypertension and prostate cancer who comes to the ER with reports of lower cervical upper thoracic pain.  Initially described the pain is between his shoulder blades.  He says certain positions aggravate this pain.  It does not hurt all the time.  When he leans over to put his shoes on it aches.  Ambulation does not make it worse.  He does not have any nausea, vomiting, lightheadedness or presyncope.  It does not feel like a tearing sensation between his shoulder blades.  He denies any anterior chest pain/pressure or tightness, no palpitations.  He denies radicular symptoms.      Review of Systems   Constitutional:  Negative for chills, fatigue and fever.   HENT:  Negative for congestion, sinus pain and sore throat.    Respiratory:  Negative for cough, chest tightness, shortness of breath and wheezing.    Cardiovascular:  Negative for chest pain, palpitations and leg swelling.   Gastrointestinal:  Negative for abdominal pain, constipation, diarrhea, nausea and vomiting.   Genitourinary:  Negative for dysuria, frequency, hematuria and urgency.   Musculoskeletal:  Positive for back pain. Negative for arthralgias and myalgias.   Neurological:  Negative for dizziness, numbness and headaches.   Psychiatric/Behavioral:  Negative for confusion.        Past Medical History:   Diagnosis Date    Anxiety     Asthma     CAD (coronary artery disease)     Caffeine use     2 Cups Daily.     COPD (chronic obstructive pulmonary disease)     Depression     Diabetes mellitus     Hyperlipidemia     Hypertension     Myocardial infarction     Obesity     Prostate cancer        Allergies   Allergen Reactions    Penicillins Other (See Comments)     Yrs ago went \"out of it\"      Sulfa Antibiotics Hives       Past Surgical History:   Procedure Laterality Date    CARDIAC CATHETERIZATION  08/2010    CARDIOVASCULAR STRESS TEST  11/2014    " CARDIOVASCULAR STRESS TEST  2014    COLONOSCOPY  2014    COLONOSCOPY  2014    CORONARY STENT PLACEMENT      CORONARY STENT PLACEMENT      ECHO - CONVERTED  2011    OTHER SURGICAL HISTORY      Cath Stent Placement  and .    PROSTATECTOMY      Radical       Family History   Problem Relation Age of Onset    Alzheimer's disease Mother     No Known Problems Brother     Coronary artery disease Other     Cancer Other         Colon       Social History     Socioeconomic History    Marital status:    Tobacco Use    Smoking status: Former     Types: Cigarettes     Quit date: 1991     Years since quittin.6    Smokeless tobacco: Never   Vaping Use    Vaping Use: Never used   Substance and Sexual Activity    Alcohol use: No    Drug use: No    Sexual activity: Defer           Objective   Physical Exam  Vitals and nursing note reviewed. Exam conducted with a chaperone present.   Constitutional:       General: He is not in acute distress.     Appearance: He is well-developed and normal weight. He is not ill-appearing.   HENT:      Head: Normocephalic.      Nose: Nose normal.      Mouth/Throat:      Mouth: Mucous membranes are moist.      Pharynx: Oropharynx is clear.   Eyes:      Extraocular Movements: Extraocular movements intact.      Pupils: Pupils are equal, round, and reactive to light.   Neck:      Vascular: No JVD.   Cardiovascular:      Rate and Rhythm: Normal rate and regular rhythm.      Heart sounds: No murmur heard.     No friction rub. No gallop.   Pulmonary:      Effort: Pulmonary effort is normal. No tachypnea.      Breath sounds: Normal breath sounds. No decreased breath sounds, wheezing, rhonchi or rales.   Abdominal:      General: Bowel sounds are normal.      Palpations: Abdomen is soft.   Musculoskeletal:         General: Tenderness present. Normal range of motion.      Cervical back: Normal range of motion and neck supple.      Right lower leg: No edema.      Left  lower leg: No edema.      Comments: Tenderness to palpation in the lower part of the cervical/upper thoracic column, hypertonic paraspinal muscles   Skin:     General: Skin is warm and dry.      Capillary Refill: Capillary refill takes less than 2 seconds.   Neurological:      General: No focal deficit present.      Mental Status: He is alert and oriented to person, place, and time.   Psychiatric:         Mood and Affect: Mood normal.         Behavior: Behavior normal.         Procedures           ED Course  ED Course as of 01/15/24 1735   Mon Primitivo 15, 2024   1519 ECG 12 Lead Other; pain in between his shoulder blades  Normal sinus rhythm, rate 63, QTc 405, no acute ST or T wave changes [CW]      ED Course User Index  [CW] Don Trinh,                                              Medical Decision Making  --Patient hemodynamically stable, symptoms are atypical and are not chest pain, more lower the cervical/thoracic.  Does have prostate cancer.  He has no radiculopathy  --Labs overall unremarkable aside from calcium of 11  --D-dimer negative (undetectable), troponin normal  --EKG nonischemic  --X-ray cervical/thoracic negative  --IV Decadron, normal saline, Flexeril for MSK pain  --Discussed with patient, recommend outpatient follow-up with PCP and hematology as noted on discharge instructions    Problems Addressed:  Musculoskeletal back pain: complicated acute illness or injury  Prostate cancer: complicated acute illness or injury    Amount and/or Complexity of Data Reviewed  Labs: ordered.  Radiology: ordered.  ECG/medicine tests: ordered. Decision-making details documented in ED Course.    Risk  OTC drugs.  Prescription drug management.        Final diagnoses:   Musculoskeletal back pain   Prostate cancer       ED Disposition  ED Disposition       ED Disposition   Discharge    Condition   Stable    Comment   --               Adryan Rodney MD  [address]  [id number]  [phone]    In 1 week      Heme/onc      As needed         Medication List        New Prescriptions      acetaminophen 500 MG tablet  Commonly known as: TYLENOL  Take 2 tablets by mouth 3 times a day for 10 days.     cyclobenzaprine 10 MG tablet  Commonly known as: FLEXERIL  Take 1 tablet by mouth 3 (Three) Times a Day As Needed for Muscle Spasms.     predniSONE 20 MG tablet  Commonly known as: DELTASONE  Take 2 tablets by mouth Daily for 5 days.               Where to Get Your Medications        [id number] - [place], [place] - [address] AT [facility] - [phone] [facility] [phone]   [address] [place], [place] [address] [address]      Phone: [phone]   acetaminophen 500 MG tablet  cyclobenzaprine 10 MG tablet  predniSONE 20 MG tablet            Don Trinh DO  01/15/24 3944

## 2024-01-16 LAB
QT INTERVAL: 396 MS
QTC INTERVAL: 405 MS

## 2024-02-02 ENCOUNTER — TELEPHONE (OUTPATIENT)
Dept: PULMONOLOGY | Facility: CLINIC | Age: 79
End: 2024-02-02
Payer: MEDICARE

## 2024-02-02 NOTE — TELEPHONE ENCOUNTER
Caller: Rose Singer    Relationship to patient: Self    Best call back number: [phone]     Patient is needing:  PATIENT REQUESTING SAMPLES OF STIOLTO.  PLEASE CONTACT PATIENT TO ADVISE, THANK YOU.

## 2024-02-02 NOTE — TELEPHONE ENCOUNTER
Called and spoke to patients wife to let her know I have a sample of Stiolto for him at the  for .

## 2024-02-09 RX ORDER — METFORMIN HYDROCHLORIDE 500 MG/1
1000 TABLET, EXTENDED RELEASE ORAL
Qty: 180 TABLET | Refills: 0 | Status: SHIPPED | OUTPATIENT
Start: 2024-02-09

## 2024-02-21 ENCOUNTER — TELEPHONE (OUTPATIENT)
Dept: CARDIOLOGY | Facility: CLINIC | Age: 79
End: 2024-02-21
Payer: MEDICARE

## 2024-02-21 RX ORDER — LANCETS 30 GAUGE
1 EACH MISCELLANEOUS DAILY
Qty: 100 EACH | Refills: 3 | Status: SHIPPED | OUTPATIENT
Start: 2024-02-21

## 2024-02-26 RX ORDER — DONEPEZIL HYDROCHLORIDE 10 MG/1
10 TABLET, FILM COATED ORAL NIGHTLY
Qty: 90 TABLET | Refills: 1 | Status: SHIPPED | OUTPATIENT
Start: 2024-02-26

## 2024-03-11 ENCOUNTER — TELEPHONE (OUTPATIENT)
Dept: CARDIOLOGY | Facility: CLINIC | Age: 79
End: 2024-03-11
Payer: MEDICARE

## 2024-03-11 RX ORDER — DAPAGLIFLOZIN 10 MG/1
1 TABLET, FILM COATED ORAL DAILY
Qty: 28 TABLET | Refills: 0 | COMMUNITY
Start: 2024-03-11 | End: 2024-03-18

## 2024-03-13 RX ORDER — LISINOPRIL 10 MG/1
10 TABLET ORAL 2 TIMES DAILY
Qty: 180 TABLET | Refills: 0 | Status: SHIPPED | OUTPATIENT
Start: 2024-03-13

## 2024-03-13 RX ORDER — MEMANTINE HYDROCHLORIDE 10 MG/1
10 TABLET ORAL 2 TIMES DAILY
Qty: 180 TABLET | Refills: 1 | Status: SHIPPED | OUTPATIENT
Start: 2024-03-13

## 2024-03-18 ENCOUNTER — OFFICE VISIT (OUTPATIENT)
Dept: CARDIOLOGY | Facility: CLINIC | Age: 79
End: 2024-03-18
Payer: MEDICARE

## 2024-03-18 VITALS
WEIGHT: 190.2 LBS | HEART RATE: 76 BPM | HEIGHT: 71 IN | BODY MASS INDEX: 26.63 KG/M2 | OXYGEN SATURATION: 100 % | DIASTOLIC BLOOD PRESSURE: 66 MMHG | SYSTOLIC BLOOD PRESSURE: 111 MMHG

## 2024-03-18 DIAGNOSIS — I10 ESSENTIAL HYPERTENSION: ICD-10-CM

## 2024-03-18 DIAGNOSIS — E11.9 TYPE 2 DIABETES MELLITUS WITHOUT COMPLICATION, WITHOUT LONG-TERM CURRENT USE OF INSULIN: ICD-10-CM

## 2024-03-18 DIAGNOSIS — I25.10 CORONARY ARTERY DISEASE INVOLVING NATIVE CORONARY ARTERY OF NATIVE HEART WITHOUT ANGINA PECTORIS: ICD-10-CM

## 2024-03-18 DIAGNOSIS — R07.9 CHEST PAIN IN ADULT: Primary | ICD-10-CM

## 2024-03-18 DIAGNOSIS — E78.2 MIXED HYPERLIPIDEMIA: ICD-10-CM

## 2024-03-18 PROCEDURE — 99214 OFFICE O/P EST MOD 30 MIN: CPT | Performed by: INTERNAL MEDICINE

## 2024-03-18 PROCEDURE — 3078F DIAST BP <80 MM HG: CPT | Performed by: INTERNAL MEDICINE

## 2024-03-18 PROCEDURE — 3074F SYST BP LT 130 MM HG: CPT | Performed by: INTERNAL MEDICINE

## 2024-03-18 RX ORDER — ROSUVASTATIN CALCIUM 20 MG/1
20 TABLET, COATED ORAL DAILY
Qty: 90 TABLET | Refills: 1 | Status: SHIPPED | OUTPATIENT
Start: 2024-03-18

## 2024-03-18 NOTE — PROGRESS NOTES
subjective     Chief Complaint   Patient presents with    Coronary Artery Disease    Diabetes       Problems Addressed This Visit  1. Chest pain in adult    2. Coronary artery disease involving native coronary artery of native heart without angina pectoris    3. Essential hypertension    4. Mixed hyperlipidemia    5. Type 2 diabetes mellitus without complication, without long-term current use of insulin        History of Present Illness    Patricia Singer is 78 years old gentleman who is here for follow-up.  Patient has history of prostate CA and is following closely with her urologist.  He states that his PSA is significantly better and overall doing good.    Coronary artery disease status post myocardial infarction and PCI to the RCA in 2005.  He  has been asymptomatic, no palpitations or shortness of breath.  He has not had any ischemic workup in quite a while which will be arranged.    Hypertension  Blood pressure is very well controlled.  He is taking lisinopril    Hyperlipidemia on Crestor therapy.  He is also trying to follow diet.    Diabetes mellitus type 2  Patient is taking metformin, Farxiga and is following diet.    Mild memory loss  He is taking Aricept and Namenda no drug side effects.    Past Surgical History:   Procedure Laterality Date    CARDIAC CATHETERIZATION  08/2010    CARDIOVASCULAR STRESS TEST  11/2014    CARDIOVASCULAR STRESS TEST  11/2014    COLONOSCOPY  09/01/2014    COLONOSCOPY  09/2014    CORONARY STENT PLACEMENT  2005    CORONARY STENT PLACEMENT  2010    ECHO - CONVERTED  08/2011    OTHER SURGICAL HISTORY      Cath Stent Placement 2005 and 2010.    PROSTATECTOMY      Radical     Family History   Problem Relation Age of Onset    Alzheimer's disease Mother     No Known Problems Brother     Coronary artery disease Other     Cancer Other         Colon     Past Medical History:   Diagnosis Date    Anxiety     Asthma     CAD (coronary artery disease)     Caffeine use     2 Cups Daily.     COPD  "(chronic obstructive pulmonary disease)     Depression     Diabetes mellitus     Hyperlipidemia     Hypertension     Myocardial infarction     Obesity     Prostate cancer      Patient Active Problem List   Diagnosis    CAD (coronary artery disease) status post myocardial infarction and PCI to the RCA in     Diabetes mellitus    Mixed hyperlipidemia    Depression    Essential hypertension    Obesity    Prostate CA    Memory loss    Environmental allergies    Vitamin D deficiency    Shortness of breath    Simple chronic bronchitis    Multiple pulmonary nodules    Tick bite of left thigh       Social History     Tobacco Use    Smoking status: Former     Current packs/day: 0.00     Types: Cigarettes     Quit date: 1991     Years since quittin.8    Smokeless tobacco: Never   Vaping Use    Vaping status: Never Used   Substance Use Topics    Alcohol use: No    Drug use: No       Allergies   Allergen Reactions    Penicillins Other (See Comments)     Yrs ago went \"out of it\"      Sulfa Antibiotics Hives       Current Outpatient Medications on File Prior to Visit   Medication Sig    albuterol sulfate  (90 Base) MCG/ACT inhaler Inhale 2 puffs Every 4 (Four) Hours As Needed for Wheezing.    aspirin 325 MG tablet Take 1 tablet by mouth Daily.    Blood Glucose Monitoring Suppl (Blood Glucose Monitor System) w/Device kit 1 each Daily.    cholecalciferol (VITAMIN D3) 1000 UNITS tablet Take 1 tablet by mouth Daily.    cyclobenzaprine (FLEXERIL) 10 MG tablet Take 1 tablet by mouth 3 (Three) Times a Day As Needed for Muscle Spasms.    donepezil (ARICEPT) 10 MG tablet TAKE 1 TABLET BY MOUTH EVERY NIGHT    glucose blood test strip 1 each by Other route As Needed (prn). Use as instructed    imipramine (TOFRANIL) 50 MG tablet Take 1 tablet by mouth Every Night.    Lancets misc Use 1 stick Daily.    lisinopril (PRINIVIL,ZESTRIL) 10 MG tablet TAKE 1 TABLET BY MOUTH TWICE A DAY    memantine (NAMENDA) 10 MG tablet TAKE 1 " "TABLET BY MOUTH TWICE A DAY    metFORMIN ER (GLUCOPHAGE-XR) 500 MG 24 hr tablet TAKE 2 TABLETS BY MOUTH DAILY WITH BREAKFAST    montelukast (SINGULAIR) 10 MG tablet TAKE ONE TABLET BY MOUTH EVERY NIGHT AT BEDTIME    nitroglycerin (NITROSTAT) 0.4 MG SL tablet 1 under the tongue as needed for angina, may repeat q5mins for up three doses    Omeprazole Magnesium 10 MG pack Take 20 mg by mouth At Night As Needed (as needed for heartburn).    relugolix (ORGOVYX) 120 MG tablet tablet Take 1 tablet by mouth Daily.    tiotropium bromide-olodaterol (STIOLTO RESPIMAT) 2.5-2.5 MCG/ACT aerosol solution inhaler Inhale 2 puffs Daily.    vitamin B-12 (CYANOCOBALAMIN) 1000 MCG tablet Take 1 tablet by mouth Daily.     No current facility-administered medications on file prior to visit.         The following portions of the patient's history were reviewed and updated as appropriate: allergies, current medications, past family history, past medical history, past social history, past surgical history and problem list.    Review of Systems   Constitutional: Negative.   HENT: Negative.  Negative for congestion.    Eyes: Negative.    Cardiovascular: Negative.  Negative for chest pain, cyanosis, dyspnea on exertion, irregular heartbeat, leg swelling, near-syncope, orthopnea, palpitations, paroxysmal nocturnal dyspnea and syncope.   Respiratory: Negative.  Negative for shortness of breath.    Hematologic/Lymphatic: Negative.    Musculoskeletal: Negative.    Gastrointestinal: Negative.    Neurological: Negative.  Negative for headaches.          Objective:     /66 (BP Location: Left arm, Patient Position: Sitting, Cuff Size: Adult)   Pulse 76   Ht 180.3 cm (71\")   Wt 86.3 kg (190 lb 3.2 oz)   SpO2 100%   BMI 26.53 kg/m²   Pulmonary:      Effort: Pulmonary effort is normal.      Breath sounds: Normal breath sounds. No stridor. No wheezing. No rhonchi. No rales.   Cardiovascular:      PMI at left midclavicular line. Normal rate. " "Regular rhythm. Normal S1. Normal S2.       Murmurs: There is no murmur.      No gallop.  No click. No rub.   Pulses:     Intact distal pulses.   Edema:     Peripheral edema absent.           Lab Review  Lab Results   Component Value Date     01/15/2024    K 5.2 01/15/2024     01/15/2024    BUN 14 01/15/2024    CREATININE 1.17 01/15/2024    GLUCOSE 124 (H) 01/15/2024    CALCIUM 11.0 (H) 01/15/2024    ALT 16 01/15/2024    ALKPHOS 58 01/15/2024    LABIL2 1.6 02/10/2016     Lab Results   Component Value Date    CKTOTAL 53 01/15/2024     Lab Results   Component Value Date    WBC 7.61 01/15/2024    HGB 15.3 01/15/2024    HCT 44.8 01/15/2024     01/15/2024     Lab Results   Component Value Date    INR 0.96 01/15/2024     No results found for: \"MG\"  Lab Results   Component Value Date    PSA 0.02 02/09/2024    TSH 1.800 01/15/2024               Procedures       I personally viewed and interpreted the patient's LAB data         Assessment:     1. Chest pain in adult    2. Coronary artery disease involving native coronary artery of native heart without angina pectoris    3. Essential hypertension    4. Mixed hyperlipidemia    5. Type 2 diabetes mellitus without complication, without long-term current use of insulin          Plan:   Coronary artery disease status post myocardial infarction  He is doing well however he has having atypical chest discomfort which is probably noncardiac however he has  not had ischemic workup in quite some time  He has multiple risk factors for coronary artery disease with a known prior intervention, hyperlipidemia and diabetes mellitus and hypertension  Will arrange a stress test for further evaluation.    Hypertension is very well-controlled.  He will continue lisinopril.    Hyperlipidemia  He is taking Crestor but he has not had lab work.  Lab work was arranged.    Diabetes mellitus type 2 .  he will take metformin and Farxiga.    Follow-up scheduled        No follow-ups on " file.

## 2024-03-19 ENCOUNTER — TELEPHONE (OUTPATIENT)
Dept: CARDIOLOGY | Facility: CLINIC | Age: 79
End: 2024-03-19
Payer: MEDICARE

## 2024-03-19 RX ORDER — DAPAGLIFLOZIN 10 MG/1
1 TABLET, FILM COATED ORAL DAILY
Qty: 90 TABLET | Refills: 0 | Status: SHIPPED | OUTPATIENT
Start: 2024-03-19 | End: 2024-03-19

## 2024-03-19 RX ORDER — DAPAGLIFLOZIN 10 MG/1
10 TABLET, FILM COATED ORAL DAILY
Qty: 90 TABLET | Refills: 1 | Status: SHIPPED | OUTPATIENT
Start: 2024-03-19

## 2024-03-19 NOTE — TELEPHONE ENCOUNTER
Rose went to  his tradjenta and is $300 month.  He stated he cannot afford this and wishes to go back on the farxiga.     I called [facility] to see if they had ran his farxiga before under generic.  They only ran it as brand name.  The cost should be lower for farxiga.if he can go back on this.      Please advise.

## 2024-03-25 ENCOUNTER — TELEPHONE (OUTPATIENT)
Dept: CARDIOLOGY | Facility: CLINIC | Age: 79
End: 2024-03-25
Payer: MEDICARE

## 2024-03-25 DIAGNOSIS — E83.52 HYPERCALCEMIA: Primary | ICD-10-CM

## 2024-03-25 NOTE — TELEPHONE ENCOUNTER
ADV PT TO GET LAB WORK COMPLETED. PTS WIFE STATES THEY COMPLETE THEIR LABS AT [facility]. WILL BE BY IN THE NEXT DAY OR TWO TO  ORDER FOR LABS.

## 2024-03-25 NOTE — TELEPHONE ENCOUNTER
----- Message from Adryan Rodney MD sent at 3/25/2024  9:18 AM EDT -----  Calcium is high  Need to check vitamin D level and parathyroid level.  I have ordered the lab work please ask him to go get the lab work done .

## 2024-03-29 ENCOUNTER — TELEPHONE (OUTPATIENT)
Dept: CARDIOLOGY | Facility: CLINIC | Age: 79
End: 2024-03-29
Payer: MEDICARE

## 2024-03-29 DIAGNOSIS — E83.52 HYPERCALCEMIA: Primary | ICD-10-CM

## 2024-03-29 NOTE — TELEPHONE ENCOUNTER
CALLED AND GIVEN MESSAGE PER DR TAMELA Rodney, MD Fernando Tavarez Sheila D, MA      Caller: Unspecified (Today, 10:51 AM)  Parathyroid hormone level is normal  Calcium level is high as expected from last lab work .  We should stop vitamin D and milk intake if you are drinking.  Will check calcium and vitamin D in 1 month

## 2024-04-16 ENCOUNTER — TELEPHONE (OUTPATIENT)
Dept: PULMONOLOGY | Facility: CLINIC | Age: 79
End: 2024-04-16

## 2024-04-16 NOTE — TELEPHONE ENCOUNTER
Caller: ANUSHA BLACK    Relationship:SELF    Callback number: [phone]   Is it ok to leave a message: [] Yes [] No    Requested medication for samples: STIOLTO      Who will be picking up the samples: PT    Do you need information about patient financial assistance for this medication: [] Yes [] No    Additional details provided: PLEASE LET PT KNOW IF SAMPLES ARE AVAILABLE

## 2024-05-03 ENCOUNTER — HOSPITAL ENCOUNTER (OUTPATIENT)
Dept: NUCLEAR MEDICINE | Facility: HOSPITAL | Age: 79
Discharge: HOME | End: 2024-05-03
Payer: MEDICARE

## 2024-05-03 ENCOUNTER — HOSPITAL ENCOUNTER (OUTPATIENT)
Dept: CARDIOLOGY | Facility: HOSPITAL | Age: 79
Discharge: HOME | End: 2024-05-03
Payer: MEDICARE

## 2024-05-03 ENCOUNTER — TELEPHONE (OUTPATIENT)
Dept: CARDIOLOGY | Facility: CLINIC | Age: 79
End: 2024-05-03

## 2024-05-03 DIAGNOSIS — I25.10 CORONARY ARTERY DISEASE INVOLVING NATIVE CORONARY ARTERY OF NATIVE HEART WITHOUT ANGINA PECTORIS: ICD-10-CM

## 2024-05-03 DIAGNOSIS — R07.9 CHEST PAIN IN ADULT: ICD-10-CM

## 2024-05-03 LAB
BH CV REST NUCLEAR ISOTOPE DOSE: 10.6 MCI
BH CV STRESS BP STAGE 1: NORMAL
BH CV STRESS DURATION MIN STAGE 1: 3
BH CV STRESS DURATION MIN STAGE 2: 3
BH CV STRESS DURATION MIN STAGE 3: 0
BH CV STRESS DURATION SEC STAGE 1: 0
BH CV STRESS DURATION SEC STAGE 2: 0
BH CV STRESS DURATION SEC STAGE 3: 36
BH CV STRESS GRADE STAGE 1: 10
BH CV STRESS GRADE STAGE 2: 12
BH CV STRESS GRADE STAGE 3: 14
BH CV STRESS HR STAGE 1: 100
BH CV STRESS HR STAGE 2: 116
BH CV STRESS HR STAGE 3: 122
BH CV STRESS METS STAGE 1: 5
BH CV STRESS METS STAGE 2: 7.5
BH CV STRESS METS STAGE 3: 10
BH CV STRESS NUCLEAR ISOTOPE DOSE: 30.6 MCI
BH CV STRESS PROTOCOL 1: NORMAL
BH CV STRESS RECOVERY BP: NORMAL MMHG
BH CV STRESS RECOVERY HR: 94 BPM
BH CV STRESS SPEED STAGE 1: 1.7
BH CV STRESS SPEED STAGE 2: 2.5
BH CV STRESS SPEED STAGE 3: 3.4
BH CV STRESS STAGE 1: 1
BH CV STRESS STAGE 2: 2
BH CV STRESS STAGE 3: 3
LV EF NUC BP: 70 %
MAXIMAL PREDICTED HEART RATE: 144 BPM
PERCENT MAX PREDICTED HR: 84.72 %
STRESS BASELINE BP: NORMAL MMHG
STRESS BASELINE HR: 80 BPM
STRESS PERCENT HR: 100 %
STRESS POST ESTIMATED WORKLOAD: 8.7 METS
STRESS POST EXERCISE DUR MIN: 6 MIN
STRESS POST EXERCISE DUR SEC: 36 SEC
STRESS POST PEAK BP: NORMAL MMHG
STRESS POST PEAK HR: 122 BPM
STRESS TARGET HR: 122 BPM

## 2024-05-03 PROCEDURE — A9500 TC99M SESTAMIBI: HCPCS | Performed by: INTERNAL MEDICINE

## 2024-05-03 PROCEDURE — 0 TECHNETIUM SESTAMIBI: Performed by: INTERNAL MEDICINE

## 2024-05-03 PROCEDURE — 93017 CV STRESS TEST TRACING ONLY: CPT

## 2024-05-03 PROCEDURE — 78452 HT MUSCLE IMAGE SPECT MULT: CPT

## 2024-05-03 RX ADMIN — TECHNETIUM TC 99M SESTAMIBI 1 DOSE: 1 INJECTION INTRAVENOUS at 09:06

## 2024-05-03 RX ADMIN — TECHNETIUM TC 99M SESTAMIBI 1 DOSE: 1 INJECTION INTRAVENOUS at 07:42

## 2024-05-03 NOTE — TELEPHONE ENCOUNTER
----- Message from Manuel BILL sent at 5/3/2024  1:17 PM EDT -----  Stress test is good.  It shows old heart attack but no new blockages

## 2024-05-09 RX ORDER — METFORMIN HYDROCHLORIDE 500 MG/1
1000 TABLET, EXTENDED RELEASE ORAL
Qty: 180 TABLET | Refills: 0 | Status: SHIPPED | OUTPATIENT
Start: 2024-05-09

## 2024-05-20 ENCOUNTER — OFFICE VISIT (OUTPATIENT)
Dept: PULMONOLOGY | Facility: CLINIC | Age: 79
End: 2024-05-20
Payer: MEDICARE

## 2024-05-20 VITALS
HEART RATE: 71 BPM | TEMPERATURE: 97.8 F | SYSTOLIC BLOOD PRESSURE: 104 MMHG | WEIGHT: 193.8 LBS | DIASTOLIC BLOOD PRESSURE: 60 MMHG | OXYGEN SATURATION: 92 % | BODY MASS INDEX: 28.71 KG/M2 | HEIGHT: 69 IN

## 2024-05-20 DIAGNOSIS — C61 PROSTATE CA: ICD-10-CM

## 2024-05-20 DIAGNOSIS — J44.9 CHRONIC OBSTRUCTIVE PULMONARY DISEASE, UNSPECIFIED COPD TYPE: Primary | ICD-10-CM

## 2024-05-20 DIAGNOSIS — R91.8 MULTIPLE PULMONARY NODULES: ICD-10-CM

## 2024-05-20 PROCEDURE — 3078F DIAST BP <80 MM HG: CPT | Performed by: PHYSICIAN ASSISTANT

## 2024-05-20 PROCEDURE — 1160F RVW MEDS BY RX/DR IN RCRD: CPT | Performed by: PHYSICIAN ASSISTANT

## 2024-05-20 PROCEDURE — 1159F MED LIST DOCD IN RCRD: CPT | Performed by: PHYSICIAN ASSISTANT

## 2024-05-20 PROCEDURE — 94618 PULMONARY STRESS TESTING: CPT | Performed by: PHYSICIAN ASSISTANT

## 2024-05-20 PROCEDURE — 3074F SYST BP LT 130 MM HG: CPT | Performed by: PHYSICIAN ASSISTANT

## 2024-05-20 PROCEDURE — 99214 OFFICE O/P EST MOD 30 MIN: CPT | Performed by: PHYSICIAN ASSISTANT

## 2024-05-20 NOTE — PROGRESS NOTES
"Chief Complaint  COPD, Pulmonary nodules      Subjective        Rose Singer presents to [facility]  History of Present Illness    Mr. Singer presents today for follow up of COPD, pulmonary nodules, and former smoking history.   Continues to follow up with  due to history of prostate cancer. Had to take oral agent (chemo agent?) with expected PSA decline during follow ups. Also completed follow up CT imaging with  in May which remains stable.    Out of his previous maintenance inhaler (stiolto) due to cost.   Has noted increased of shortness of breath with exertion but quickly improves with brief rest. Notes shortness of breath most prominently earlier in the daytime. Symptom improvement noted with maintenance inhaler use.   Has not yet qualified for supplemental oxygen use.   Completed recent stress test which was nonsignificant.      Objective   Vital Signs:  /60   Pulse 71   Temp 97.8 °F (36.6 °C)   Ht 175.3 cm (69\")   Wt 87.9 kg (193 lb 12.8 oz)   SpO2 92%   BMI 28.62 kg/m²   Estimated body mass index is 28.62 kg/m² as calculated from the following:    Height as of this encounter: 175.3 cm (69\").    Weight as of this encounter: 87.9 kg (193 lb 12.8 oz).         Physical Exam  Vitals reviewed.   Constitutional:       General: He is not in acute distress.     Appearance: He is not diaphoretic.   HENT:      Head: Normocephalic and atraumatic.   Cardiovascular:      Rate and Rhythm: Normal rate and regular rhythm.   Pulmonary:      Effort: Pulmonary effort is normal.      Breath sounds: No wheezing, rhonchi or rales.   Neurological:      Mental Status: He is alert and oriented to person, place, and time.   Psychiatric:         Behavior: Behavior normal.        Result Review :    The following data was reviewed by: Isabel Contreras PA-C on 05/20/2024:  CT chest report only from May 2024        PFT 2020  Flow volume loop was assessed.  Spirometry showed " moderate obstruction.  There was no significant bronchodilator response.  Lung volumes are normal.  Air-trapping noted.  Diffusing capacity, uncorrected for hemoglobin, is mildly reduced.                Assessment and Plan     Diagnoses and all orders for this visit:    1. Chronic obstructive pulmonary disease, unspecified COPD type (Primary)    2. Multiple pulmonary nodules    3. Prostate CA        COPD:  Can't afford stiolto but notes benefit from use.   Continue albuterol inhaler as needed  Provided sample of Trelegy ellipta 100 mcg once daily  Rinse mouth after use.   If not beneficial--will look for least costly LAMA/LABA agent   Prefers to await PFT until the next appointment.   Completed walk oximetry testing in office today - no desaturation.       Pulmonary nodules, Former smoker:    Recently completed CT at  (prostate cancer).   Reported stability of a pulmonary nodule at 6 mm (previously 7 mm, initially noted in 2020; truly 7.9 mm but typed in error of 3.9 mm)  Multiple calcified nodules mentioned/visible previously, but not mentioned on current report (suspect stability since they were not mentioned).   Can follow up with repeat CT per [facility] recommendations, or in 1 year.   Aiming for 5 year follow up for the non-calcified nodule.   2 year follow up now completed for the calcified nodules.   Does not qualify for LDCT screenings.       Follow Up     Return in about 4 months (around 9/20/2024).  Patient was given instructions and counseling regarding his condition or for health maintenance advice. Please see specific information pulled into the AVS if appropriate.

## 2024-05-30 ENCOUNTER — TELEPHONE (OUTPATIENT)
Dept: CARDIOLOGY | Facility: CLINIC | Age: 79
End: 2024-05-30
Payer: MEDICARE

## 2024-05-31 RX ORDER — FLUTICASONE FUROATE, UMECLIDINIUM BROMIDE AND VILANTEROL TRIFENATATE 100; 62.5; 25 UG/1; UG/1; UG/1
1 POWDER RESPIRATORY (INHALATION)
Qty: 60 EACH | Refills: 5 | Status: SHIPPED | OUTPATIENT
Start: 2024-05-31

## 2024-06-11 RX ORDER — LISINOPRIL 10 MG/1
10 TABLET ORAL 2 TIMES DAILY
Qty: 180 TABLET | Refills: 0 | Status: SHIPPED | OUTPATIENT
Start: 2024-06-11

## 2024-06-19 ENCOUNTER — OFFICE VISIT (OUTPATIENT)
Dept: CARDIOLOGY | Facility: CLINIC | Age: 79
End: 2024-06-19
Payer: MEDICARE

## 2024-06-19 VITALS
BODY MASS INDEX: 27.16 KG/M2 | OXYGEN SATURATION: 98 % | HEART RATE: 55 BPM | DIASTOLIC BLOOD PRESSURE: 84 MMHG | WEIGHT: 194 LBS | HEIGHT: 71 IN | SYSTOLIC BLOOD PRESSURE: 142 MMHG

## 2024-06-19 DIAGNOSIS — R41.3 MEMORY LOSS: ICD-10-CM

## 2024-06-19 DIAGNOSIS — I25.10 CORONARY ARTERY DISEASE INVOLVING NATIVE CORONARY ARTERY OF NATIVE HEART WITHOUT ANGINA PECTORIS: Primary | ICD-10-CM

## 2024-06-19 DIAGNOSIS — E78.2 MIXED HYPERLIPIDEMIA: ICD-10-CM

## 2024-06-19 DIAGNOSIS — I10 ESSENTIAL HYPERTENSION: ICD-10-CM

## 2024-06-19 DIAGNOSIS — E55.9 VITAMIN D DEFICIENCY: ICD-10-CM

## 2024-06-19 DIAGNOSIS — E11.9 TYPE 2 DIABETES MELLITUS WITHOUT COMPLICATION, WITHOUT LONG-TERM CURRENT USE OF INSULIN: ICD-10-CM

## 2024-06-19 NOTE — PROGRESS NOTES
subjective     Chief Complaint   Patient presents with    Chest Pain     Follow up         Problems Addressed This Visit  1. Coronary artery disease involving native coronary artery of native heart without angina pectoris    2. Vitamin D deficiency    3. Type 2 diabetes mellitus without complication, without long-term current use of insulin    4. Mixed hyperlipidemia    5. Memory loss    6. Essential hypertension        History of Present Illness    Patient is 78 years old white male who is here for follow-up.  He has known coronary artery disease status post myocardial infarction and PCI to the RCA in 2005.  He is physically quite active denies any chest pain palpitations or shortness of breath.    Hypertension  Blood pressure is very well-controlled he is taking lisinopril.    Hyperlipidemia on Crestor therapy no drug side effects.    He is also diabetic and is taking metformin and Farxiga.    Mild memory loss doing very well with Aricept and Namenda.      Past Surgical History:   Procedure Laterality Date    CARDIAC CATHETERIZATION  08/2010    CARDIOVASCULAR STRESS TEST  11/2014    CARDIOVASCULAR STRESS TEST  11/2014    COLONOSCOPY  09/01/2014    COLONOSCOPY  09/2014    CORONARY STENT PLACEMENT  2005    CORONARY STENT PLACEMENT  2010    ECHO - CONVERTED  08/2011    OTHER SURGICAL HISTORY      Cath Stent Placement 2005 and 2010.    PROSTATECTOMY      Radical     Family History   Problem Relation Age of Onset    Alzheimer's disease Mother     No Known Problems Brother     Coronary artery disease Other     Cancer Other         Colon     Past Medical History:   Diagnosis Date    Anxiety     Asthma     Asthma, extrinsic     CAD (coronary artery disease)     Caffeine use     2 Cups Daily.     COPD (chronic obstructive pulmonary disease)     Depression     Diabetes mellitus     GERD (gastroesophageal reflux disease)     Hyperlipidemia     Hypertension     Lung nodule     Myocardial infarction     Obesity     Prostate  "cancer     Pulmonary arterial hypertension      Patient Active Problem List   Diagnosis    CAD (coronary artery disease) status post myocardial infarction and PCI to the RCA in     Diabetes mellitus    Mixed hyperlipidemia    Depression    Essential hypertension    Obesity    Prostate CA    Memory loss    Environmental allergies    Vitamin D deficiency    Shortness of breath    Simple chronic bronchitis    Multiple pulmonary nodules    Tick bite of left thigh       Social History     Tobacco Use    Smoking status: Former     Current packs/day: 0.00     Average packs/day: 1 pack/day for 10.0 years (10.0 ttl pk-yrs)     Types: Cigarettes     Start date: 1981     Quit date: 1991     Years since quittin.1     Passive exposure: Past    Smokeless tobacco: Never   Vaping Use    Vaping status: Never Used   Substance Use Topics    Alcohol use: No    Drug use: No       Allergies   Allergen Reactions    Penicillins Other (See Comments)     Yrs ago went \"out of it\"      Sulfa Antibiotics Hives       Current Outpatient Medications on File Prior to Visit   Medication Sig    albuterol sulfate  (90 Base) MCG/ACT inhaler Inhale 2 puffs Every 4 (Four) Hours As Needed for Wheezing.    aspirin 325 MG tablet Take 1 tablet by mouth Daily.    Blood Glucose Monitoring Suppl (Blood Glucose Monitor System) w/Device kit 1 each Daily.    cholecalciferol (VITAMIN D3) 1000 UNITS tablet Take 1 tablet by mouth Daily.    dapagliflozin Propanediol (Farxiga) 10 MG tablet Take 10 mg by mouth Daily.    donepezil (ARICEPT) 10 MG tablet TAKE 1 TABLET BY MOUTH EVERY NIGHT    Fluticasone-Umeclidin-Vilant (Trelegy Ellipta) 100-62.5-25 MCG/ACT inhaler Inhale 1 puff Daily.    glucose blood test strip 1 each by Other route As Needed (prn). Use as instructed    imipramine (TOFRANIL) 50 MG tablet Take 1 tablet by mouth Every Night.    Lancets misc Use 1 stick Daily.    lisinopril (PRINIVIL,ZESTRIL) 10 MG tablet TAKE 1 TABLET BY MOUTH " "TWICE A DAY    memantine (NAMENDA) 10 MG tablet TAKE 1 TABLET BY MOUTH TWICE A DAY    metFORMIN ER (GLUCOPHAGE-XR) 500 MG 24 hr tablet TAKE 2 TABLETS BY MOUTH DAILY WITH BREAKFAST    nitroglycerin (NITROSTAT) 0.4 MG SL tablet 1 under the tongue as needed for angina, may repeat q5mins for up three doses    Omeprazole Magnesium 10 MG pack Take 20 mg by mouth At Night As Needed (as needed for heartburn).    relugolix (ORGOVYX) 120 MG tablet tablet Take 1 tablet by mouth Daily.    rosuvastatin (CRESTOR) 20 MG tablet Take 1 tablet by mouth Daily.    vitamin B-12 (CYANOCOBALAMIN) 1000 MCG tablet Take 1 tablet by mouth Daily.     No current facility-administered medications on file prior to visit.         The following portions of the patient's history were reviewed and updated as appropriate: allergies, current medications, past family history, past medical history, past social history, past surgical history and problem list.    Review of Systems   Constitutional: Negative.   HENT: Negative.  Negative for congestion.    Eyes: Negative.    Cardiovascular: Negative.  Negative for chest pain, cyanosis, dyspnea on exertion, irregular heartbeat, leg swelling, near-syncope, orthopnea, palpitations, paroxysmal nocturnal dyspnea and syncope.   Respiratory: Negative.  Negative for shortness of breath.    Hematologic/Lymphatic: Negative.    Musculoskeletal: Negative.    Gastrointestinal: Negative.    Neurological: Negative.  Negative for headaches.          Objective:     /84 (BP Location: Left arm, Patient Position: Sitting, Cuff Size: Adult)   Pulse 55   Ht 180.3 cm (71\")   Wt 88 kg (194 lb)   SpO2 98%   BMI 27.06 kg/m²   Pulmonary:      Effort: Pulmonary effort is normal.      Breath sounds: Normal breath sounds. No stridor. No wheezing. No rhonchi. No rales.   Cardiovascular:      PMI at left midclavicular line. Normal rate. Regular rhythm. Normal S1. Normal S2.       Murmurs: There is no murmur.      No gallop.  No " "click. No rub.   Pulses:     Intact distal pulses.   Edema:     Peripheral edema absent.           Lab Review    Lab Results   Component Value Date    CKTOTAL 53 01/15/2024     Lab Results   Component Value Date    WBC 7.51 05/08/2024    HGB 15.3 05/08/2024    HCT 45.3 05/08/2024     05/08/2024     Lab Results   Component Value Date    INR 0.96 01/15/2024     No results found for: \"MG\"  Lab Results   Component Value Date    PSA <0.01 05/08/2024    TSH 1.800 01/15/2024               Procedures       I personally viewed and interpreted the patient's LAB data         Assessment:     1. Coronary artery disease involving native coronary artery of native heart without angina pectoris    2. Vitamin D deficiency    3. Type 2 diabetes mellitus without complication, without long-term current use of insulin    4. Mixed hyperlipidemia    5. Memory loss    6. Essential hypertension          Plan:     Coronary artery disease  Patient is stable and is asymptomatic he was advised to continue antiplatelet therapy with aspirin and statin therapy with Crestor.  He is not taking any beta-blocker therapy because of bradycardia.    Hyperlipidemia  Patient was advised to continue Crestor 20 mg daily.  Lab work scheduled.    Aricept and Namenda were continued.  Blood pressure is also very well-controlled he will continue lisinopril.  Type 2 diabetes mellitus    Last blood sugar in May was elevated around 150.  Dietary restrictions were discussed.  He is taking medications regularly.  Will have lab work repeated.    No follow-ups on file.  "

## 2024-07-03 RX ORDER — BLOOD SUGAR DIAGNOSTIC
STRIP MISCELLANEOUS
Qty: 100 EACH | Refills: 3 | Status: SHIPPED | OUTPATIENT
Start: 2024-07-03

## 2024-08-08 RX ORDER — METFORMIN HYDROCHLORIDE 500 MG/1
1000 TABLET, EXTENDED RELEASE ORAL
Qty: 180 TABLET | Refills: 0 | Status: SHIPPED | OUTPATIENT
Start: 2024-08-08

## 2024-08-19 RX ORDER — DONEPEZIL HYDROCHLORIDE 10 MG/1
10 TABLET, FILM COATED ORAL NIGHTLY
Qty: 90 TABLET | Refills: 1 | Status: SHIPPED | OUTPATIENT
Start: 2024-08-19

## 2024-09-11 RX ORDER — MEMANTINE HYDROCHLORIDE 10 MG/1
10 TABLET ORAL 2 TIMES DAILY
Qty: 180 TABLET | Refills: 1 | Status: SHIPPED | OUTPATIENT
Start: 2024-09-11

## 2024-09-11 RX ORDER — LISINOPRIL 10 MG/1
10 TABLET ORAL 2 TIMES DAILY
Qty: 180 TABLET | Refills: 0 | Status: SHIPPED | OUTPATIENT
Start: 2024-09-11

## 2024-09-12 DIAGNOSIS — I10 ESSENTIAL HYPERTENSION: ICD-10-CM

## 2024-09-12 DIAGNOSIS — E78.2 MIXED HYPERLIPIDEMIA: ICD-10-CM

## 2024-09-12 DIAGNOSIS — I25.10 CORONARY ARTERY DISEASE INVOLVING NATIVE CORONARY ARTERY OF NATIVE HEART WITHOUT ANGINA PECTORIS: ICD-10-CM

## 2024-09-12 RX ORDER — ROSUVASTATIN CALCIUM 20 MG/1
20 TABLET, COATED ORAL DAILY
Qty: 90 TABLET | Refills: 1 | Status: SHIPPED | OUTPATIENT
Start: 2024-09-12

## 2024-09-20 ENCOUNTER — OFFICE VISIT (OUTPATIENT)
Dept: PULMONOLOGY | Facility: CLINIC | Age: 79
End: 2024-09-20
Payer: MEDICARE

## 2024-09-20 VITALS
BODY MASS INDEX: 28.31 KG/M2 | SYSTOLIC BLOOD PRESSURE: 124 MMHG | TEMPERATURE: 96.7 F | DIASTOLIC BLOOD PRESSURE: 62 MMHG | OXYGEN SATURATION: 98 % | WEIGHT: 202.2 LBS | HEIGHT: 71 IN | HEART RATE: 56 BPM

## 2024-09-20 DIAGNOSIS — J41.0 SIMPLE CHRONIC BRONCHITIS: ICD-10-CM

## 2024-09-20 PROCEDURE — 3074F SYST BP LT 130 MM HG: CPT | Performed by: PHYSICIAN ASSISTANT

## 2024-09-20 PROCEDURE — 1160F RVW MEDS BY RX/DR IN RCRD: CPT | Performed by: PHYSICIAN ASSISTANT

## 2024-09-20 PROCEDURE — 99214 OFFICE O/P EST MOD 30 MIN: CPT | Performed by: PHYSICIAN ASSISTANT

## 2024-09-20 PROCEDURE — 3078F DIAST BP <80 MM HG: CPT | Performed by: PHYSICIAN ASSISTANT

## 2024-09-20 PROCEDURE — 1159F MED LIST DOCD IN RCRD: CPT | Performed by: PHYSICIAN ASSISTANT

## 2024-09-20 RX ORDER — ALBUTEROL SULFATE 90 UG/1
2 INHALANT RESPIRATORY (INHALATION) EVERY 4 HOURS PRN
Qty: 18 G | Refills: 11 | Status: SHIPPED | OUTPATIENT
Start: 2024-09-20

## 2024-09-20 NOTE — PROGRESS NOTES
"Chief Complaint  COPD and asthma    Subjective        Rose Singer presents to [facility] PULMONARY & CRITICAL CARE MEDICINE  History of Present Illness    Patient presents today for follow-up assessment.  Since last visit, he is continued on Trelegy Ellipta 100 mcg and feels that this is effective at this time.  Notes occasional fluctuations but otherwise seems overall stable at this time.  Has rescue options available for as needed use.  Continue to follow-up with  intermittently.  History of prostate cancer noted.      Objective   Vital Signs:  /62   Pulse 56   Temp 96.7 °F (35.9 °C)   Ht 180.3 cm (71\")   Wt 91.7 kg (202 lb 3.2 oz)   SpO2 98%   BMI 28.20 kg/m²   Estimated body mass index is 28.2 kg/m² as calculated from the following:    Height as of this encounter: 180.3 cm (71\").    Weight as of this encounter: 91.7 kg (202 lb 3.2 oz).      Physical Exam  Vitals reviewed.   Constitutional:       General: He is not in acute distress.     Appearance: He is not diaphoretic.   HENT:      Head: Normocephalic and atraumatic.   Cardiovascular:      Rate and Rhythm: Normal rate and regular rhythm.   Pulmonary:      Effort: Pulmonary effort is normal.      Breath sounds: No wheezing, rhonchi or rales.   Neurological:      Mental Status: He is alert and oriented to person, place, and time.   Psychiatric:         Behavior: Behavior normal.         Result Review :  The following data was reviewed by: Isabel Contreras PA-C on 09/20/2024:    CT chest report from May 2024          -----------------------------------------------------------------------------------    CT report November 2023      -----------------------------------------------------------------------------------    PFT 2020    Flow volume loop was assessed.  Spirometry showed moderate obstruction.  There was no significant bronchodilator response.  Lung volumes are normal.  Air-trapping noted.  Diffusing capacity, uncorrected for " hemoglobin, is mildly reduced.          -----------------------------------------------------------------------------------          Assessment and Plan   Diagnoses and all orders for this visit:    1. Simple chronic bronchitis  -     albuterol sulfate  (90 Base) MCG/ACT inhaler; Inhale 2 puffs Every 4 (Four) Hours As Needed for Wheezing or Shortness of Air.  Dispense: 18 g; Refill: 11          COPD:  Can't afford stiolto but notes benefit from use.   Continue albuterol inhaler as needed  Continue Trelegy Ellipta 100 mcg daily  No urgent need for PFT as symptoms are overall stable at this time.  Can consider overnight pulse oximetry testing at the next appointment.        Pulmonary nodules, Former smoker:    Recently completed CT at  (prostate cancer).   Last CT report available from  May 2024.  - Reported stability of a pulmonary nodule at 6 mm (previously 7 mm, initially noted in 2020; truly measured 7.9 mm but typed in error of 3.9 mm)  - Multiple calcified nodules mentioned/visible previously, but not mentioned on May 2024 report (suspect stability since they were not mentioned).     Aiming for 5 year follow up for the non-calcified nodule.   Can also follow-up with this imaging of the calcified nodules, typically recommended for 2-year stability monitoring to presume benign.  Will monitor for next  imaging, or consider ordering in May 2025 as needed.    Does not qualify for low-dose CT screenings.        Follow Up   Return in about 6 months (around 3/20/2025), or if symptoms worsen or fail to improve, for Next scheduled follow up.  Patient was given instructions and counseling regarding his condition or for health maintenance advice. Please see specific information pulled into the AVS if appropriate.

## 2024-09-25 ENCOUNTER — OFFICE VISIT (OUTPATIENT)
Dept: CARDIOLOGY | Facility: CLINIC | Age: 79
End: 2024-09-25
Payer: MEDICARE

## 2024-09-25 VITALS
BODY MASS INDEX: 28.14 KG/M2 | HEART RATE: 63 BPM | SYSTOLIC BLOOD PRESSURE: 104 MMHG | WEIGHT: 201 LBS | OXYGEN SATURATION: 98 % | DIASTOLIC BLOOD PRESSURE: 64 MMHG | HEIGHT: 71 IN

## 2024-09-25 DIAGNOSIS — I10 ESSENTIAL HYPERTENSION: ICD-10-CM

## 2024-09-25 DIAGNOSIS — E78.2 MIXED HYPERLIPIDEMIA: Primary | ICD-10-CM

## 2024-09-25 DIAGNOSIS — E11.9 TYPE 2 DIABETES MELLITUS WITHOUT COMPLICATION, WITHOUT LONG-TERM CURRENT USE OF INSULIN: ICD-10-CM

## 2024-09-25 DIAGNOSIS — R41.3 MEMORY LOSS: ICD-10-CM

## 2024-09-25 DIAGNOSIS — E55.9 VITAMIN D DEFICIENCY: ICD-10-CM

## 2024-09-25 DIAGNOSIS — I25.10 CORONARY ARTERY DISEASE INVOLVING NATIVE CORONARY ARTERY OF NATIVE HEART WITHOUT ANGINA PECTORIS: ICD-10-CM

## 2024-09-25 RX ORDER — LISINOPRIL 10 MG/1
15 TABLET ORAL DAILY
Qty: 180 TABLET | Refills: 0 | Status: SHIPPED | OUTPATIENT
Start: 2024-09-25

## 2024-11-08 RX ORDER — METFORMIN HYDROCHLORIDE 500 MG/1
1000 TABLET, EXTENDED RELEASE ORAL
Qty: 180 TABLET | Refills: 0 | Status: SHIPPED | OUTPATIENT
Start: 2024-11-08

## 2024-12-04 RX ORDER — FLUTICASONE FUROATE, UMECLIDINIUM BROMIDE AND VILANTEROL TRIFENATATE 100; 62.5; 25 UG/1; UG/1; UG/1
1 POWDER RESPIRATORY (INHALATION) DAILY
Qty: 60 EACH | Refills: 5 | Status: SHIPPED | OUTPATIENT
Start: 2024-12-04

## 2024-12-11 RX ORDER — LISINOPRIL 10 MG/1
10 TABLET ORAL 2 TIMES DAILY
Qty: 180 TABLET | Refills: 0 | Status: SHIPPED | OUTPATIENT
Start: 2024-12-11

## 2024-12-19 ENCOUNTER — OFFICE VISIT (OUTPATIENT)
Dept: CARDIOLOGY | Facility: CLINIC | Age: 79
End: 2024-12-19
Payer: MEDICARE

## 2024-12-19 VITALS
OXYGEN SATURATION: 98 % | WEIGHT: 198 LBS | SYSTOLIC BLOOD PRESSURE: 100 MMHG | HEIGHT: 69 IN | HEART RATE: 64 BPM | DIASTOLIC BLOOD PRESSURE: 58 MMHG | BODY MASS INDEX: 29.33 KG/M2

## 2024-12-19 DIAGNOSIS — E78.2 MIXED HYPERLIPIDEMIA: ICD-10-CM

## 2024-12-19 DIAGNOSIS — E87.5 HYPERKALEMIA: ICD-10-CM

## 2024-12-19 DIAGNOSIS — E11.9 TYPE 2 DIABETES MELLITUS WITHOUT COMPLICATION, WITHOUT LONG-TERM CURRENT USE OF INSULIN: ICD-10-CM

## 2024-12-19 DIAGNOSIS — I25.10 CORONARY ARTERY DISEASE INVOLVING NATIVE CORONARY ARTERY OF NATIVE HEART WITHOUT ANGINA PECTORIS: Primary | ICD-10-CM

## 2024-12-19 DIAGNOSIS — I10 ESSENTIAL HYPERTENSION: ICD-10-CM

## 2024-12-19 DIAGNOSIS — E55.9 VITAMIN D DEFICIENCY: ICD-10-CM

## 2024-12-19 DIAGNOSIS — R41.3 MEMORY LOSS: ICD-10-CM

## 2024-12-19 RX ORDER — RELUGOLIX 120 MG/1
120 TABLET, FILM COATED ORAL DAILY
COMMUNITY
Start: 2024-11-25

## 2024-12-19 RX ORDER — LISINOPRIL 10 MG/1
10 TABLET ORAL DAILY
Qty: 90 TABLET | Refills: 0 | Status: SHIPPED | OUTPATIENT
Start: 2024-12-19 | End: 2024-12-19 | Stop reason: SDUPTHER

## 2024-12-19 RX ORDER — LISINOPRIL 10 MG/1
5 TABLET ORAL DAILY
Qty: 90 TABLET | Refills: 0 | Status: SHIPPED | OUTPATIENT
Start: 2024-12-19

## 2024-12-19 NOTE — PROGRESS NOTES
subjective     Chief Complaint   Patient presents with    Diabetes Mellitus     Improved since starting new medication    Med Refill     Pending       Problems Addressed This Visit  1. Coronary artery disease involving native coronary artery of native heart without angina pectoris    2. Type 2 diabetes mellitus without complication, without long-term current use of insulin    3. Mixed hyperlipidemia    4. Essential hypertension    5. Vitamin D deficiency    6. Memory loss    7. Hyperkalemia        History of Present Illness    Patient is 79 years old white male who is here for follow-up of multiple chronic medical problems.  He states that his blood pressure is running quite low he runs around 100 off-and-on goes up to 120 but is never over 120s systolic.  He is taking lisinopril 15 mg daily but he was advised to decrease it to 10 mg daily and may need to decrease it further  Goal of blood pressure between 1 20-1 30 was discussed.    Hyperlipidemia  Patient is trying to follow diet is taking Crestor.  No drug side effects.    Coronary artery disease status post PCI to RCA in 2005.  Denies any chest pain palpitation shortness of breath orthopnea PND or ankle edema.      Diabetes mellitus  He is taking Farxiga 10 mg daily metformin 1000 twice daily and Tradjenta 5 mg daily.  He is doing well.    Memory loss  He is stable doing well with Namenda and Aricept.        Past Surgical History:   Procedure Laterality Date    CARDIAC CATHETERIZATION  08/2010    CARDIOVASCULAR STRESS TEST  11/2014    CARDIOVASCULAR STRESS TEST  11/2014    COLONOSCOPY  09/01/2014    COLONOSCOPY  09/2014    CORONARY STENT PLACEMENT  2005    CORONARY STENT PLACEMENT  2010    ECHO - CONVERTED  08/2011    OTHER SURGICAL HISTORY      Cath Stent Placement 2005 and 2010.    PROSTATECTOMY      Radical     Family History   Problem Relation Age of Onset    Alzheimer's disease Mother     Diabetes Father     No Known Problems Brother     Coronary artery  "disease Other     Cancer Other         Colon     Past Medical History:   Diagnosis Date    Allergic rhinitis     Anxiety     Asthma     Asthma, extrinsic     CAD (coronary artery disease)     Caffeine use     2 Cups Daily.     COPD (chronic obstructive pulmonary disease)     Depression     Diabetes mellitus     GERD (gastroesophageal reflux disease)     Hyperlipidemia     Hypertension     Lung nodule     Myocardial infarction     Obesity     Prostate cancer     Prostate cancer     Pulmonary arterial hypertension      Patient Active Problem List   Diagnosis    CAD (coronary artery disease) status post myocardial infarction and PCI to the RCA in     Diabetes mellitus    Mixed hyperlipidemia    Depression    Essential hypertension    Obesity    Prostate CA    Memory loss    Environmental allergies    Vitamin D deficiency    Shortness of breath    Simple chronic bronchitis    Multiple pulmonary nodules    Tick bite of left thigh    Hyperkalemia       Social History     Tobacco Use    Smoking status: Former     Current packs/day: 0.00     Average packs/day: 1 pack/day for 10.0 years (10.0 ttl pk-yrs)     Types: Cigarettes, Pipe, Cigars     Start date: 1981     Quit date: 1991     Years since quittin.6     Passive exposure: Past    Smokeless tobacco: Never   Vaping Use    Vaping status: Never Used   Substance Use Topics    Alcohol use: No    Drug use: No       Allergies   Allergen Reactions    Penicillins Other (See Comments)     Yrs ago went \"out of it\"      Sulfa Antibiotics Hives       Current Outpatient Medications on File Prior to Visit   Medication Sig    albuterol sulfate  (90 Base) MCG/ACT inhaler Inhale 2 puffs Every 4 (Four) Hours As Needed for Wheezing or Shortness of Air.    aspirin 325 MG tablet Take 1 tablet by mouth Daily.    Blood Glucose Monitoring Suppl (Blood Glucose Monitor System) w/Device kit 1 each Daily.    cholecalciferol (VITAMIN D3) 1000 UNITS tablet Take 1 tablet by " mouth Daily.    dapagliflozin Propanediol (Farxiga) 10 MG tablet Take 10 mg by mouth Daily.    donepezil (ARICEPT) 10 MG tablet TAKE 1 TABLET BY MOUTH EVERY NIGHT    glucose blood (OneTouch Ultra) test strip TEST EVERY DAY AS NEEDED    imipramine (TOFRANIL) 50 MG tablet Take 1 tablet by mouth Every Night.    Lancets misc Use 1 stick Daily.    memantine (NAMENDA) 10 MG tablet TAKE 1 TABLET BY MOUTH TWICE A DAY    metFORMIN ER (GLUCOPHAGE-XR) 500 MG 24 hr tablet TAKE 2 TABLETS BY MOUTH DAILY WITH BREAKFAST    nitroglycerin (NITROSTAT) 0.4 MG SL tablet 1 under the tongue as needed for angina, may repeat q5mins for up three doses    Omeprazole Magnesium 10 MG pack Take 20 mg by mouth At Night As Needed (as needed for heartburn).    Orgovyx 120 MG tablet tablet Take 1 tablet by mouth Daily.    rosuvastatin (CRESTOR) 20 MG tablet TAKE 1 TABLET BY MOUTH DAILY    Trelegy Ellipta 100-62.5-25 MCG/ACT inhaler INHALE 1 PUFF BY MOUTH DAILY    vitamin B-12 (CYANOCOBALAMIN) 1000 MCG tablet Take 1 tablet by mouth Daily.    [DISCONTINUED] linagliptin (TRADJENTA) 5 MG tablet tablet Take 1 tablet by mouth Daily.    [DISCONTINUED] lisinopril (PRINIVIL,ZESTRIL) 10 MG tablet TAKE 1 TABLET BY MOUTH TWICE A DAY     No current facility-administered medications on file prior to visit.     (Not in a hospital admission)      Results for orders placed during the hospital encounter of 08/18/20    Adult Transthoracic Echo Complete W/ Cont if Necessary Per Protocol    Interpretation Summary  · Normal left ventricular cavity size with mild concentric hypertrophy.  · Left ventricular systolic function is normal. Estimated EF appears to be in the range of 56 - 60%.  · Left ventricular diastolic dysfunction is noted (grade I) consistent with impaired relaxation.  · No significant valvular heart disease  · There is no evidence of pericardial effusion.    Results for orders placed during the hospital encounter of 05/03/24    Stress Test With Myocardial  Perfusion One Day    Interpretation Summary    A stress test was performed following the Avelino protocol.    Resting EKG showed sinus rhythm at a rate of 80 bpm.  Small Q waves in leads II, III, aVF, probably old inferior wall myocardial infarction.    Patient exercised for 6 minutes and 36 seconds, predicted target heart rate was not achieved.  However 84% of predicted maximal heart rate was achieved.  Workload at peak exercise was 8.7 METS.  Patient denied any chest discomfort during exercise or recovery.    Blood pressure response was appropriate.  Heart rate response was sluggish.    ST segments did not show any diagnostic changes.  Occasional PVCs were noted during recovery.    Findings consistent with a normal ECG stress test up to 84% of predicted maximal heart rate.    Left ventricular ejection fraction is normal (Calculated EF = 70%).    Exercise images show a small fixed defect involving the apical inferior and apical lateral segment of the left ventricle.  Rest images do not show any reversibility.  Actually stress images are better than rest images.    TID 0.84.    Myocardial perfusion imaging indicates a small-sized infarct located in the apex with no significant ischemia noted.    This is a low risk study.    There is no prior study available for comparison.          The following portions of the patient's history were reviewed and updated as appropriate: allergies, current medications, past family history, past medical history, past social history, past surgical history and problem list.    Review of Systems   Constitutional: Negative.   HENT: Negative.  Negative for congestion.    Eyes: Negative.    Cardiovascular: Negative.  Negative for chest pain, cyanosis, dyspnea on exertion, irregular heartbeat, leg swelling, near-syncope, orthopnea, palpitations, paroxysmal nocturnal dyspnea and syncope.   Respiratory: Negative.  Negative for shortness of breath.    Hematologic/Lymphatic: Negative.   "  Musculoskeletal: Negative.    Gastrointestinal: Negative.    Neurological: Negative.  Negative for headaches.          Objective:     /58 (BP Location: Left arm, Patient Position: Sitting, Cuff Size: Adult)   Pulse 64   Ht 175.3 cm (69\")   Wt 89.8 kg (198 lb)   SpO2 98%   BMI 29.24 kg/m²   Pulmonary:      Effort: Pulmonary effort is normal.      Breath sounds: Normal breath sounds. No stridor. No wheezing. No rhonchi. No rales.   Cardiovascular:      PMI at left midclavicular line. Normal rate. Regular rhythm. Normal S1. Normal S2.       Murmurs: There is no murmur.      No gallop.  No click. No rub.   Pulses:     Intact distal pulses.   Edema:     Peripheral edema absent.           Lab Review      Lab Results   Component Value Date    WBC 7.65 12/04/2024    HGB 15.3 12/04/2024    HCT 44.9 12/04/2024     12/04/2024     Lab Results   Component Value Date    INR 0.96 01/15/2024     No results found for: \"MG\"  Lab Results   Component Value Date    PSA <0.01 12/04/2024    TSH 1.800 01/15/2024     No results found for: \"BNP\"  Lab Results   Component Value Date    CHLPL 143 02/10/2016    CHOL 115 05/17/2022    TRIG 139 05/17/2022    HDL 32 (L) 05/17/2022    VLDL 25 05/17/2022    LDL 58 05/17/2022     Lab Results   Component Value Date    LDL 58 05/17/2022    LDL 91 01/18/2017     No results found for: \"PROBNP\"   Contains abnormal data COMPREHENSIVE METABOLIC PANEL  Order: [id number]  Component  Ref Range & Units 2 wk ago   Glucose  74 - 99 mg/dL 123 High    BUN  8 - 23 mg/dL 22   Creatinine  0.70 - 1.20 mg/dL 1.04   BUN/Creatinine Ratio 21   Sodium  136 - 145 mmol/L 141   Potassium  3.6 - 4.9 mmol/L 5.8 High    Chloride  97 - 107 mmol/L 107   Total CO2  22 - 29 mmol/L 27   Anion Gap  6 - 16 mmol/L 7   Calcium  8.9 - 10.2 mg/dL 11.2 High    Total Protein  6.3 - 7.9 g/dL 6.7   Albumin  3.5 - 5.2 g/dL 4.3   AST (SGOT)  10 - 50 U/L 20   ALT (SGPT)  10 - 50 U/L 21   Alkaline Phosphatase  40 - 115 U/L 65 "   Total Bilirubin  0.2 - 1.1 mg/dL 0.4   eGFRcr  mL/min/1.73m*2 74               ECG 12 Lead    Date/Time: 12/19/2024 3:43 PM  Performed by: Adryan Rodney MD    Authorized by: Adryan Rodney MD  Comparison: compared with previous ECG from 1/15/2024  Similar to previous ECG  Rhythm: sinus rhythm  Rate: normal  BPM: 64  Conduction: conduction normal  Q waves: II, III and aVF    ST Segments: ST segments normal  T Waves: T waves normal  QRS axis: normal    Clinical impression: abnormal EKG and myocardial infarction  Comments: 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 Coronary artery disease involving native coronary artery of native heart without angina pectoris    2. Type 2 diabetes mellitus without complication, without long-term current use of insulin    3. Mixed hyperlipidemia    4. Essential hypertension    5. Vitamin D deficiency    6. Memory loss    7. Hyperkalemia          Plan:     Coronary artery disease  Patient is stable and is asymptomatic status post inferior wall myocardial infarction and coronary intervention.  He will continue current medications aggressive risk factor modification emphasized.    Hyperlipidemia  He will continue Crestor  Lab work scheduled.    Hypertension  Blood pressure is running low patient was advised to decrease lisinopril 5 mg daily potassium is high, patient was advised to avoid high potassium diet such as bananas oranges tomatoes.  Will check lab work again after decreasing dose of lisinopril.    Memory loss Aricept and Namenda continued.    Diabetes mellitus he will continue current medications.  Follow-up scheduled        No follow-ups on file.

## 2025-01-13 ENCOUNTER — TELEPHONE (OUTPATIENT)
Dept: CARDIOLOGY | Facility: CLINIC | Age: 80
End: 2025-01-13
Payer: MEDICARE

## 2025-01-13 NOTE — TELEPHONE ENCOUNTER
Spoke with pts wife, adv of lion message regarding lab work    ----- Message from Irene Pedro sent at 1/10/2025  8:45 AM EST -----  Recheck of labs is normal   Kidney function is stable   Potassium is normal, continue to avoid foods high in potassium - low potassium diet recommended  Patient should have routine labs prior to next visit.

## 2025-02-04 RX ORDER — METFORMIN HYDROCHLORIDE 500 MG/1
1000 TABLET, EXTENDED RELEASE ORAL
Qty: 180 TABLET | Refills: 0 | Status: SHIPPED | OUTPATIENT
Start: 2025-02-04

## 2025-02-14 RX ORDER — DONEPEZIL HYDROCHLORIDE 10 MG/1
10 TABLET, FILM COATED ORAL NIGHTLY
Qty: 90 TABLET | Refills: 1 | Status: SHIPPED | OUTPATIENT
Start: 2025-02-14

## 2025-02-17 ENCOUNTER — TELEPHONE (OUTPATIENT)
Dept: CARDIOLOGY | Facility: CLINIC | Age: 80
End: 2025-02-17
Payer: MEDICARE

## 2025-02-17 RX ORDER — NITROGLYCERIN 0.4 MG/1
TABLET SUBLINGUAL
Qty: 25 TABLET | Refills: 3 | Status: SHIPPED | OUTPATIENT
Start: 2025-02-17

## 2025-03-08 DIAGNOSIS — I25.10 CORONARY ARTERY DISEASE INVOLVING NATIVE CORONARY ARTERY OF NATIVE HEART WITHOUT ANGINA PECTORIS: ICD-10-CM

## 2025-03-08 DIAGNOSIS — I10 ESSENTIAL HYPERTENSION: ICD-10-CM

## 2025-03-08 DIAGNOSIS — E78.2 MIXED HYPERLIPIDEMIA: ICD-10-CM

## 2025-03-10 RX ORDER — MEMANTINE HYDROCHLORIDE 10 MG/1
10 TABLET ORAL 2 TIMES DAILY
Qty: 180 TABLET | Refills: 1 | Status: SHIPPED | OUTPATIENT
Start: 2025-03-10

## 2025-03-10 RX ORDER — LISINOPRIL 10 MG/1
10 TABLET ORAL 2 TIMES DAILY
Qty: 180 TABLET | Refills: 1 | Status: SHIPPED | OUTPATIENT
Start: 2025-03-10

## 2025-03-10 RX ORDER — ROSUVASTATIN CALCIUM 20 MG/1
20 TABLET, COATED ORAL DAILY
Qty: 90 TABLET | Refills: 1 | Status: SHIPPED | OUTPATIENT
Start: 2025-03-10

## 2025-03-20 ENCOUNTER — OFFICE VISIT (OUTPATIENT)
Dept: PULMONOLOGY | Facility: CLINIC | Age: 80
End: 2025-03-20
Payer: MEDICARE

## 2025-03-20 VITALS
DIASTOLIC BLOOD PRESSURE: 88 MMHG | HEIGHT: 70 IN | HEART RATE: 61 BPM | TEMPERATURE: 97.6 F | OXYGEN SATURATION: 99 % | BODY MASS INDEX: 29.52 KG/M2 | WEIGHT: 206.2 LBS | SYSTOLIC BLOOD PRESSURE: 132 MMHG

## 2025-03-20 DIAGNOSIS — C61 PROSTATE CA: ICD-10-CM

## 2025-03-20 DIAGNOSIS — R91.8 MULTIPLE PULMONARY NODULES: ICD-10-CM

## 2025-03-20 DIAGNOSIS — J44.9 CHRONIC OBSTRUCTIVE PULMONARY DISEASE, UNSPECIFIED COPD TYPE: Primary | ICD-10-CM

## 2025-03-20 PROCEDURE — 99214 OFFICE O/P EST MOD 30 MIN: CPT | Performed by: PHYSICIAN ASSISTANT

## 2025-03-20 PROCEDURE — 3079F DIAST BP 80-89 MM HG: CPT | Performed by: PHYSICIAN ASSISTANT

## 2025-03-20 PROCEDURE — 3075F SYST BP GE 130 - 139MM HG: CPT | Performed by: PHYSICIAN ASSISTANT

## 2025-03-20 NOTE — PROGRESS NOTES
"Chief Complaint  Simple chronic bronchitis    Subjective        Rose Singer presents to [facility] PULMONARY & CRITICAL CARE MEDICINE  History of Present Illness    Mr. Singer presents today for follow up.   Shortness of breath much improved at this time (last few months).   Has some issue with copay first of the year, but then once deductible is met he is able to obtain his inhalers much easier. He states that he reduces inhalation strength (weaker force/intake) which benefits his breathing but also reduces his hoarseness.       Objective   Vital Signs:  /88   Pulse 61   Temp 97.6 °F (36.4 °C)   Ht 177.8 cm (70\")   Wt 93.5 kg (206 lb 3.2 oz)   SpO2 99%   BMI 29.59 kg/m²   Estimated body mass index is 29.59 kg/m² as calculated from the following:    Height as of this encounter: 177.8 cm (70\").    Weight as of this encounter: 93.5 kg (206 lb 3.2 oz).  Room air      Physical Exam  Vitals reviewed.   Constitutional:       General: He is not in acute distress.  Cardiovascular:      Rate and Rhythm: Normal rate and regular rhythm.   Pulmonary:      Effort: Pulmonary effort is normal.      Breath sounds: No wheezing, rhonchi or rales.   Neurological:      Mental Status: He is alert and oriented to person, place, and time.   Psychiatric:         Behavior: Behavior normal.        Result Review :  The following data was reviewed by: Isabel Contreras PA-C on 03/20/2025:      CT chest report from May 2024          -----------------------------------------------------------------------------------     CT report November 2023          -----------------------------------------------------------------------------------       PFT 2020     Flow volume loop was assessed.  Spirometry showed moderate obstruction.  There was no significant bronchodilator response.  Lung volumes are normal.  Air-trapping noted.  Diffusing capacity, uncorrected for hemoglobin, is mildly reduced.            Assessment and Plan "   Diagnoses and all orders for this visit:    1. Chronic obstructive pulmonary disease, unspecified COPD type (Primary)    2. Multiple pulmonary nodules    3. Prostate CA        COPD:  Continue albuterol inhaler as needed  Continue Trelegy Ellipta 100 mcg daily  No urgent need for PFT as symptoms are overall stable at this time.            Pulmonary nodules,   History of prostate cancer,  Former smoker:    Recently completed CT at  (prostate cancer).   Last CT report available from  May 2024.  - Reported stability of a pulmonary nodule at 6 mm (previously 7 mm, initially noted in 2020; truly measured 7.9 mm but typed in error of 3.9 mm)  - Multiple calcified nodules mentioned/visible previously, but not mentioned on May 2024 report (suspect stability since they were not mentioned). Stable per personal imaging comparison from [id number].   Reported on imaging through 2023 (Nov).     Aiming for 5 year follow up for the non-calcified nodule.   Per report/imaging comparison, calcified nodules likely stable from at least 1304-4047.  Goal of 2 year stability met.   Will monitor for next  imaging--will follow along to see if  does scan in May 2025.  If not, can consider ordering at next visit.   Does not qualify for low-dose CT screenings.        Follow Up   Return in about 13 weeks (around 6/19/2025), or if symptoms worsen or fail to improve, for Recheck.  Patient was given instructions and counseling regarding his condition or for health maintenance advice. Please see specific information pulled into the AVS if appropriate.

## 2025-03-25 ENCOUNTER — OFFICE VISIT (OUTPATIENT)
Dept: CARDIOLOGY | Facility: CLINIC | Age: 80
End: 2025-03-25
Payer: MEDICARE

## 2025-03-25 VITALS
BODY MASS INDEX: 28.56 KG/M2 | HEIGHT: 71 IN | OXYGEN SATURATION: 98 % | HEART RATE: 61 BPM | WEIGHT: 204 LBS | SYSTOLIC BLOOD PRESSURE: 128 MMHG | DIASTOLIC BLOOD PRESSURE: 68 MMHG

## 2025-03-25 DIAGNOSIS — E83.52 HYPERCALCEMIA: Primary | ICD-10-CM

## 2025-03-25 DIAGNOSIS — E11.9 TYPE 2 DIABETES MELLITUS WITHOUT COMPLICATION, WITHOUT LONG-TERM CURRENT USE OF INSULIN: ICD-10-CM

## 2025-03-25 DIAGNOSIS — I25.10 CORONARY ARTERY DISEASE INVOLVING NATIVE CORONARY ARTERY OF NATIVE HEART WITHOUT ANGINA PECTORIS: ICD-10-CM

## 2025-03-25 DIAGNOSIS — I10 ESSENTIAL HYPERTENSION: ICD-10-CM

## 2025-03-25 DIAGNOSIS — E78.2 MIXED HYPERLIPIDEMIA: ICD-10-CM

## 2025-03-25 PROCEDURE — 3078F DIAST BP <80 MM HG: CPT | Performed by: INTERNAL MEDICINE

## 2025-03-25 PROCEDURE — 3074F SYST BP LT 130 MM HG: CPT | Performed by: INTERNAL MEDICINE

## 2025-03-25 PROCEDURE — G2211 COMPLEX E/M VISIT ADD ON: HCPCS | Performed by: INTERNAL MEDICINE

## 2025-03-25 PROCEDURE — 99214 OFFICE O/P EST MOD 30 MIN: CPT | Performed by: INTERNAL MEDICINE

## 2025-03-25 NOTE — PROGRESS NOTES
subjective     Chief Complaint   Patient presents with    Results     labs    Ear Fullness     right       Problems Addressed This Visit  1. Hypercalcemia    2. Type 2 diabetes mellitus without complication, without long-term current use of insulin    3. Coronary artery disease involving native coronary artery of native heart without angina pectoris    4. Mixed hyperlipidemia    5. Essential hypertension        History of Present Illness  History of Present Illness  The patient is a 79-year-old white male who presents to the office today for a regular checkup and to review recent lab work. He has coronary artery disease, status post RCA stent in 2005. He is physically quite active. He does not report any chest pain, palpitations or shortness of breath. He takes his medication regularly, including statin therapy with Crestor and aspirin. He carries nitroglycerin but has not needed to use it. His hypertension is very well controlled with lisinopril 5 mg daily. He is also diabetic and is taking metformin 1000 mg daily, Tradjenta 5 mg daily and Farxiga. He states that Tradjenta is quite expensive and he is not sure if he can afford it. Blood sugar is around 164 and A1c is 6.3. Patient had lab work done. His calcium levels are high. Otherwise, lab work is good.    He reports a sensation of fullness in his right ear, describing it as feeling clogged. He does not experience any associated pain, dizziness, vertigo, or discharge. He also does not report any symptoms of cough or expectoration.    He has coronary artery disease, status post RCA stent in 2005. He is physically quite active. He does not report any chest pain, palpitations or shortness of breath. He takes his medication regularly, including statin therapy with Crestor and aspirin. He carries nitroglycerin but has not needed to use it.    His hypertension is very well controlled with lisinopril 5 mg daily.    He is also diabetic and is taking metformin 1000 mg  daily, Tradjenta 5 mg daily and Farxiga. He states that Tradjenta is quite expensive and he is not sure if he can afford it.    MEDICATIONS  Current: Crestor, aspirin, nitroglycerin (not used), lisinopril 5 mg daily, metformin 1000 mg daily, Tradjenta 5 mg daily, Farxiga, vitamin D 1000 units daily      Past Surgical History:   Procedure Laterality Date    APPENDECTOMY  1959    CARDIAC CATHETERIZATION  08/2010    CARDIAC SURGERY  2010    CARDIOVASCULAR STRESS TEST  11/2014    CARDIOVASCULAR STRESS TEST  11/2014    COLONOSCOPY  09/01/2014    COLONOSCOPY  09/2014    CORONARY STENT PLACEMENT  2005    CORONARY STENT PLACEMENT  2010    ECHO - CONVERTED  08/2011    EYE SURGERY  2014    OTHER SURGICAL HISTORY      Cath Stent Placement 2005 and 2010.    PROSTATECTOMY      Radical     Family History   Problem Relation Age of Onset    Alzheimer's disease Mother     Dementia Mother     Diabetes Father     Heart attack Father     Asthma Father     Heart disease Father     No Known Problems Brother     Coronary artery disease Other     Cancer Other         Colon    Cancer Sister      Past Medical History:   Diagnosis Date    Allergic rhinitis     Anxiety     Asthma     Asthma, extrinsic     CAD (coronary artery disease)     Caffeine use     2 Cups Daily.     COPD (chronic obstructive pulmonary disease)     Depression     Diabetes mellitus     GERD (gastroesophageal reflux disease)     Hyperlipidemia     Hypertension     Lung nodule     Myocardial infarction     Obesity     Prostate cancer     Prostate cancer     Pulmonary arterial hypertension      Patient Active Problem List   Diagnosis    CAD (coronary artery disease) status post myocardial infarction and PCI to the RCA in 2005    Diabetes mellitus    Mixed hyperlipidemia    Depression    Essential hypertension    Obesity    Prostate CA    Memory loss    Environmental allergies    Vitamin D deficiency    Shortness of breath    Simple chronic bronchitis    Multiple pulmonary  "nodules    Tick bite of left thigh    Hyperkalemia    Hypercalcemia       Social History     Tobacco Use    Smoking status: Former     Current packs/day: 0.00     Average packs/day: 1 pack/day for 15.0 years (15.0 ttl pk-yrs)     Types: Cigarettes, Pipe, Cigars     Start date: 1981     Quit date: 1991     Years since quittin.8     Passive exposure: Past    Smokeless tobacco: Never   Vaping Use    Vaping status: Never Used   Substance Use Topics    Alcohol use: No    Drug use: No       Allergies   Allergen Reactions    Penicillins Other (See Comments)     Yrs ago went \"out of it\"      Sulfa Antibiotics Hives       Current Outpatient Medications on File Prior to Visit   Medication Sig    albuterol sulfate  (90 Base) MCG/ACT inhaler Inhale 2 puffs Every 4 (Four) Hours As Needed for Wheezing or Shortness of Air.    aspirin 325 MG tablet Take 1 tablet by mouth Daily.    Blood Glucose Monitoring Suppl (Blood Glucose Monitor System) w/Device kit 1 each Daily.    cholecalciferol (VITAMIN D3) 1000 UNITS tablet Take 1 tablet by mouth Daily.    dapagliflozin Propanediol (Farxiga) 10 MG tablet Take 10 mg by mouth Daily.    donepezil (ARICEPT) 10 MG tablet TAKE 1 TABLET BY MOUTH EVERY NIGHT    glucose blood (OneTouch Ultra) test strip TEST EVERY DAY AS NEEDED    imipramine (TOFRANIL) 50 MG tablet Take 1 tablet by mouth Every Night.    Lancets misc Use 1 stick Daily.    lisinopril (PRINIVIL,ZESTRIL) 10 MG tablet TAKE 1 TABLET BY MOUTH TWICE A DAY    memantine (NAMENDA) 10 MG tablet TAKE 1 TABLET BY MOUTH TWICE A DAY    metFORMIN ER (GLUCOPHAGE-XR) 500 MG 24 hr tablet TAKE 2 TABLETS BY MOUTH DAILY WITH BREAKFAST    nitroglycerin (NITROSTAT) 0.4 MG SL tablet 1 under the tongue as needed for angina, may repeat q5mins for up three doses    Omeprazole Magnesium 10 MG pack Take 20 mg by mouth At Night As Needed (as needed for heartburn).    Orgovyx 120 MG tablet tablet Take 1 tablet by mouth Daily.    rosuvastatin " (CRESTOR) 20 MG tablet TAKE 1 TABLET BY MOUTH DAILY    Trelegy Ellipta 100-62.5-25 MCG/ACT inhaler INHALE 1 PUFF BY MOUTH DAILY    vitamin B-12 (CYANOCOBALAMIN) 1000 MCG tablet Take 1 tablet by mouth Daily.    [DISCONTINUED] linagliptin (TRADJENTA) 5 MG tablet tablet Take 1 tablet by mouth Daily.     No current facility-administered medications on file prior to visit.     (Not in a hospital admission)      Results for orders placed during the hospital encounter of 08/18/20    Adult Transthoracic Echo Complete W/ Cont if Necessary Per Protocol    Interpretation Summary  · Normal left ventricular cavity size with mild concentric hypertrophy.  · Left ventricular systolic function is normal. Estimated EF appears to be in the range of 56 - 60%.  · Left ventricular diastolic dysfunction is noted (grade I) consistent with impaired relaxation.  · No significant valvular heart disease  · There is no evidence of pericardial effusion.      Results for orders placed during the hospital encounter of 05/03/24    Stress Test With Myocardial Perfusion One Day    Interpretation Summary    A stress test was performed following the Avelino protocol.    Resting EKG showed sinus rhythm at a rate of 80 bpm.  Small Q waves in leads II, III, aVF, probably old inferior wall myocardial infarction.    Patient exercised for 6 minutes and 36 seconds, predicted target heart rate was not achieved.  However 84% of predicted maximal heart rate was achieved.  Workload at peak exercise was 8.7 METS.  Patient denied any chest discomfort during exercise or recovery.    Blood pressure response was appropriate.  Heart rate response was sluggish.    ST segments did not show any diagnostic changes.  Occasional PVCs were noted during recovery.    Findings consistent with a normal ECG stress test up to 84% of predicted maximal heart rate.    Left ventricular ejection fraction is normal (Calculated EF = 70%).    Exercise images show a small fixed defect  "involving the apical inferior and apical lateral segment of the left ventricle.  Rest images do not show any reversibility.  Actually stress images are better than rest images.    TID 0.84.    Myocardial perfusion imaging indicates a small-sized infarct located in the apex with no significant ischemia noted.    This is a low risk study.    There is no prior study available for comparison.      The following portions of the patient's history were reviewed and updated as appropriate: allergies, current medications, past family history, past medical history, past social history, past surgical history and problem list.    Review of Systems   Constitutional: Negative.   HENT:  Positive for congestion and ear pain.    Eyes: Negative.    Cardiovascular: Negative.  Negative for chest pain, cyanosis, dyspnea on exertion, irregular heartbeat, leg swelling, near-syncope, orthopnea, palpitations, paroxysmal nocturnal dyspnea and syncope.   Respiratory: Negative.  Negative for shortness of breath.    Hematologic/Lymphatic: Negative.    Musculoskeletal: Negative.    Gastrointestinal: Negative.    Neurological: Negative.  Negative for headaches.          Objective:     /68 (BP Location: Left arm, Patient Position: Sitting, Cuff Size: Adult)   Pulse 61   Ht 180.3 cm (71\")   Wt 92.5 kg (204 lb)   SpO2 98%   BMI 28.45 kg/m²   Pulmonary:      Effort: Pulmonary effort is normal.      Breath sounds: Normal breath sounds. No stridor. No wheezing. No rhonchi. No rales.   Cardiovascular:      PMI at left midclavicular line. Normal rate. Regular rhythm. Normal S1. Normal S2.       Murmurs: There is no murmur.      No gallop.  No click. No rub.   Pulses:     Intact distal pulses.   Edema:     Peripheral edema absent.       Physical Exam  Ear examination was normal.      Lab Review  Lab Results   Component Value Date    WBC 9.05 03/05/2025    HGB 15.1 03/05/2025    HCT 43.4 03/05/2025     03/05/2025       No results found " "for: \"MG\"  Lab Results   Component Value Date    PSA <0.01 03/05/2025    TSH 1.800 01/15/2024                           Procedures    Results  Laboratory Studies  Blood sugar is around 164. A1c is 6.3. Calcium levels are high. Total cholesterol is 109, HDL 41, LDL 45, Triglycerides are mildly elevated at 150.     I personally viewed and interpreted the patient's LAB data         Assessment:     1. Hypercalcemia    2. Type 2 diabetes mellitus without complication, without long-term current use of insulin    3. Coronary artery disease involving native coronary artery of native heart without angina pectoris    4. Mixed hyperlipidemia    5. Essential hypertension        Assessment & Plan  1. Right ear fullness.  He reports a sensation of fullness in the right ear as if it is clogged. There is no pain, dizziness, vertigo, or discharge. The ear examination was normal. He was advised to take Claritin.    2. Coronary artery disease, status post stents.  He is asymptomatic. Aggressive risk factor modification was stressed. He will continue aspirin and statin therapy. He could not take beta-blocker therapy because of bradycardia. He is physically quite active, which was encouraged.    3. Hypertension.  His blood pressure is very well controlled. He is only requiring lisinopril 5 mg daily.    4. Lipid control.  His total cholesterol is 109, HDL 41, and LDL 45. Triglycerides are mildly elevated at 150. No change in therapy.    5. Diabetes mellitus.  His blood sugar is 164 with hemoglobin A1c of 6.3. He will continue current medications. A refill for Tradjenta was provided.    6. Hypercalcemia.  He has elevated calcium levels. He does have a history of prostate cancer but there is no metastatic disease. He is taking vitamin D 1000 units daily but does not take any calcium supplementation. Parathyroid hormone, calcium level, and vitamin D will be checked.    PROCEDURE  The patient underwent an RCA stent placement in " 2005.          No follow-ups on file.

## 2025-04-07 PROBLEM — J44.9 CHRONIC OBSTRUCTIVE PULMONARY DISEASE: Status: ACTIVE | Noted: 2025-04-07

## 2025-05-07 RX ORDER — METFORMIN HYDROCHLORIDE 500 MG/1
1000 TABLET, EXTENDED RELEASE ORAL
Qty: 180 TABLET | Refills: 0 | Status: SHIPPED | OUTPATIENT
Start: 2025-05-07

## 2025-06-18 ENCOUNTER — OFFICE VISIT (OUTPATIENT)
Dept: PULMONOLOGY | Facility: CLINIC | Age: 80
End: 2025-06-18
Payer: MEDICARE

## 2025-06-18 VITALS
BODY MASS INDEX: 29.15 KG/M2 | RESPIRATION RATE: 18 BRPM | HEART RATE: 57 BPM | OXYGEN SATURATION: 95 % | HEIGHT: 71 IN | WEIGHT: 208.2 LBS | DIASTOLIC BLOOD PRESSURE: 68 MMHG | SYSTOLIC BLOOD PRESSURE: 124 MMHG

## 2025-06-18 DIAGNOSIS — R91.8 MULTIPLE PULMONARY NODULES: ICD-10-CM

## 2025-06-18 DIAGNOSIS — J44.9 CHRONIC OBSTRUCTIVE PULMONARY DISEASE, UNSPECIFIED COPD TYPE: Primary | ICD-10-CM

## 2025-06-18 DIAGNOSIS — C61 PROSTATE CA: ICD-10-CM

## 2025-06-18 DIAGNOSIS — Z87.891 FORMER SMOKER: ICD-10-CM

## 2025-06-18 PROCEDURE — 99214 OFFICE O/P EST MOD 30 MIN: CPT | Performed by: PHYSICIAN ASSISTANT

## 2025-06-18 PROCEDURE — 1159F MED LIST DOCD IN RCRD: CPT | Performed by: PHYSICIAN ASSISTANT

## 2025-06-18 PROCEDURE — 3078F DIAST BP <80 MM HG: CPT | Performed by: PHYSICIAN ASSISTANT

## 2025-06-18 PROCEDURE — 1160F RVW MEDS BY RX/DR IN RCRD: CPT | Performed by: PHYSICIAN ASSISTANT

## 2025-06-18 PROCEDURE — 3074F SYST BP LT 130 MM HG: CPT | Performed by: PHYSICIAN ASSISTANT

## 2025-06-18 RX ORDER — BUDESONIDE, GLYCOPYRROLATE, AND FORMOTEROL FUMARATE 160; 9; 4.8 UG/1; UG/1; UG/1
2 AEROSOL, METERED RESPIRATORY (INHALATION) 2 TIMES DAILY
Qty: 1 EACH | Refills: 8 | Status: SHIPPED | OUTPATIENT
Start: 2025-06-18

## 2025-06-18 NOTE — PROGRESS NOTES
"Chief Complaint  Chronic obstructive pulmonary disease, unspecified COPD type    Subjective        Rose Singer presents to [facility]  History of Present Illness      Mr. Singer presents today for follow up evaluation. Experiences shortness of breath at times with exertion. Primarily aggravated during times of higher humidity. Was advised by a friend to try wearing a mask, and did find some benefit from this during a previous trial.   Adjusts activities as needed per shortness of breath to avoid excessive symptoms. Able to tolerate most activities, but admits that the has \"slowed down\" some with age. [facility] (last follow up imaging was available from May 2024).   Notes fairly frequent morning headaches.  Typically uses rescue treatment in the mornings, otherwise not frequently needed.         Objective   Vital Signs:  /68 (BP Location: Right arm, Patient Position: Sitting, Cuff Size: Adult)   Pulse 57   Resp 18   Ht 180.3 cm (71\")   Wt 94.4 kg (208 lb 3.2 oz)   SpO2 95%   BMI 29.04 kg/m²   Estimated body mass index is 29.04 kg/m² as calculated from the following:    Height as of this encounter: 180.3 cm (71\").    Weight as of this encounter: 94.4 kg (208 lb 3.2 oz).        Physical Exam  Vitals reviewed.   Constitutional:       Appearance: Normal appearance.   Cardiovascular:      Rate and Rhythm: Normal rate and regular rhythm.   Pulmonary:      Effort: Pulmonary effort is normal.      Breath sounds: No wheezing, rhonchi or rales.   Neurological:      Mental Status: He is alert and oriented to person, place, and time.   Psychiatric:         Behavior: Behavior normal.        Result Review :  The following data was reviewed by: Isabel Contreras PA-C on 06/18/2025:      CT chest report (only) May 2024          CT CHEST W CONTRAST DIAGNOSTIC  Order: [id number]  Impression    Chest: No evidence of disease " progression. Unchanged left lower lobe pulmonary nodule.    Abdomen/Pelvis: No evidence of disease progression. Stable left external iliac lymph node.    CRITICAL RESULT:    No.    COMMUNICATION:  Per this written report.    Drafted by Carlee Hansen MD on 5/8/2024 12:49 PM  Final report signed by Carlee Hansen MD on 5/8/2024 1:16 PM  Narrative    CLINICAL INDICATION:  Prostate cancer, assess treatment response; Kidney cancer, active surveillance    TECHNIQUE:  Multiple axial CT images were obtained from thoracic inlet through pubic symphysis following administration of IV contrast, Omnipaque 300, 100 mL. Reformatted images of the abdomen and pelvis in the coronal and sagittal planes were generated from the axial data set to facilitate diagnostic accuracy.    Total DLP (Dose-Length Product): 602.73 mGy.cm. Please note: The reported value represents the total of one or more individual components during the CT acquisition on this date and at this time, and as such, the same value may appear in more than one CT report depending on the interpreting/reporting physicians.    COMPARISON:  PET/CT November 8, 2023 CT abdomen pelvis July 5, 2023.    FINDINGS:  Chest:  Lymph Nodes and Mediastinum: No lymphadenopathy by CT size criteria. No mediastinal mass lesions. No suspicious thyroid findings.    Cardiovascular: The heart is normal in caliber. Thoracic great vessels are patent. Coronary artery calcification.    Lungs and Pleura: Left lower lobe 6 mm nodule (series 2 image 269). This appears unchanged from comparison PET/CT. No new suspicious pulmonary nodules. No pleural effusions or suspicious thickening.    Musculoskeletal and Body Wall: No clearly aggressive bone lesions.    Abdomen/Pelvis:  Solid Abdominal Organs: Multiple scattered low-attenuation hepatic lesions remain unchanged from comparison and may represent a cyst. No definite new suspicious liver lesion. Unremarkable gallbladder. No biliary ductal dilatation.  Unremarkable spleen, pancreas, bilateral adrenal glands. Asymmetrically small right kidney with symmetric renal enhancement. Left upper pole hyperdense cyst may represent a hemorrhagic or proteinaceous cyst. No hydronephrosis.    GI Tract/Mesentery/Peritoneum: Stomach is within normal limits. The large and small bowel are normal in caliber. No suspicious mesenteric/peritoneal findings.    Pelvic Viscera: Partially distended bladder with circumferential bladder wall thickening. Post prostatectomy. No pelvic mass.    Lymph Nodes/Vasculature: No lymphadenopathy by CT size criteria. Left external iliac 8 mm lymph node, unchanged (series 3 image 2:30). The aortoiliac vasculature is patent and normal in caliber.    Free Fluid: No free fluid in the abdomen or pelvis.    Musculoskeletal and Body Wall: No aggressive or suspicious findings. Fat-containing umbilical hernia.  Exam End: 05/08/24 11:43 Last Resulted: 05/08/24 13:16   Received From: [facility]  Result Received: 05/20/24 08:49         -----------------------------------------------------------------------------------    PFT 2020    Flow volume loop was assessed.  Spirometry showed moderate obstruction.  There was no significant bronchodilator response.  Lung volumes are normal.  Air-trapping noted.  Diffusing capacity, uncorrected for hemoglobin, is mildly reduced.         -----------------------------------------------------------------------------------    6 minute walk test 2024      -----------------------------------------------------------------------------------          Assessment and Plan   Diagnoses and all orders for this visit:    1. Chronic obstructive pulmonary disease, unspecified COPD type (Primary)  -     Budeson-Glycopyrrol-Formoterol (Breztri Aerosphere) 160-9-4.8 MCG/ACT aerosol inhaler; Inhale 2 puffs 2 (Two) Times a Day. Rinse mouth out after each use to avoid oral thrush.  Dispense: 1 each; Refill: 8  -     Overnight Sleep Oximetry Study;  Future  -     Complete PFT - Pre & Post Bronchodilator; Future    2. Multiple pulmonary nodules    3. Prostate CA    4. Former smoker        COPD:   Ordered new PFT  Ordered overnight pulse oximetry test - frequent morning headaches noted.   Continue albuterol as needed  Currently on Trelegy 100 mcg once daliy  Will try switching to trial of Breztri 2 puffs BID  Rinse mouth after use to avoid thrush  Advised not to use both together - will continue with most beneficial option.         Pulmonary nodules,   History of prostate cancer,   Former smoker:   Completed follow-up imaging again at  in May 2024.   Last CT report available from  May 2024.  - Reported stability of a pulmonary nodule at 6 mm (previously 7 mm, initially noted in 2020; truly measured 7.9 mm but typed in error of 3.9 mm)  - Multiple calcified nodules mentioned/visible previously, but not mentioned on May 2024 report (suspect stability/resolved since they were not mentioned and report said no new nodules).   These were stable per personal imaging comparison from [id number].   Listed on imaging report through 2023 (Nov).  Does not qualify for LDCT screenings.   Per scan comparisons, left lower 6 mm nodule has been stable for 4 years  Can consider 1 additional follow-up CT this year, will discuss at this next visit  Per scan comparisons, calcified nodules were stable for 3 years  Meet 2-year stability follow-up, no further imaging needed for these.  Considered benign          Follow Up   Return in about 3 months (around 9/18/2025), or if symptoms worsen or fail to improve, for Recheck.  Patient was given instructions and counseling regarding his condition or for health maintenance advice. Please see specific information pulled into the AVS if appropriate.

## 2025-06-19 ENCOUNTER — OFFICE VISIT (OUTPATIENT)
Dept: CARDIOLOGY | Facility: CLINIC | Age: 80
End: 2025-06-19
Payer: MEDICARE

## 2025-06-19 VITALS
DIASTOLIC BLOOD PRESSURE: 74 MMHG | SYSTOLIC BLOOD PRESSURE: 118 MMHG | BODY MASS INDEX: 30.81 KG/M2 | HEART RATE: 57 BPM | OXYGEN SATURATION: 98 % | WEIGHT: 208 LBS | HEIGHT: 69 IN

## 2025-06-19 DIAGNOSIS — E11.9 TYPE 2 DIABETES MELLITUS WITHOUT COMPLICATION, WITHOUT LONG-TERM CURRENT USE OF INSULIN: ICD-10-CM

## 2025-06-19 DIAGNOSIS — E83.52 HYPERCALCEMIA: ICD-10-CM

## 2025-06-19 DIAGNOSIS — E55.9 VITAMIN D DEFICIENCY: ICD-10-CM

## 2025-06-19 DIAGNOSIS — I25.10 CORONARY ARTERY DISEASE INVOLVING NATIVE CORONARY ARTERY OF NATIVE HEART WITHOUT ANGINA PECTORIS: Primary | ICD-10-CM

## 2025-06-19 DIAGNOSIS — R41.3 MEMORY LOSS: ICD-10-CM

## 2025-06-19 DIAGNOSIS — I10 ESSENTIAL HYPERTENSION: ICD-10-CM

## 2025-06-19 DIAGNOSIS — R00.1 BRADYCARDIA, SINUS: ICD-10-CM

## 2025-06-19 DIAGNOSIS — E78.2 MIXED HYPERLIPIDEMIA: ICD-10-CM

## 2025-06-19 RX ORDER — LISINOPRIL 10 MG/1
5 TABLET ORAL DAILY
Qty: 90 TABLET | Refills: 1 | Status: SHIPPED | OUTPATIENT
Start: 2025-06-19

## 2025-06-19 NOTE — PROGRESS NOTES
subjective     Chief Complaint   Patient presents with    Coronary Artery Disease       Problems Addressed This Visit  1. Coronary artery disease involving native coronary artery of native heart without angina pectoris    2. Mixed hyperlipidemia    3. Essential hypertension    4. Type 2 diabetes mellitus without complication, without long-term current use of insulin    5. Memory loss    6. Vitamin D deficiency    7. Hypercalcemia    8. Bradycardia, sinus        History of Present Illness  History of Present Illness  The patient is a 79-year-old white male who is here for follow-up. He has a history of coronary artery disease, status post myocardial infarction and PCI to the RCA in 2005. He also has a history of hypertension, hyperlipidemia, and diabetes mellitus.    He reports no chest pain, palpitations, or shortness of breath. Approximately one year ago, a stress test did not show any significant exercise-induced myocardial ischemia. He is taking his medications regularly and is physically quite active.    Hypertension is very well controlled, requiring only lisinopril 5 mg daily. He has a history of hyperlipidemia and has been taking Crestor 20 mg daily. Lab work will be scheduled for the next visit.    Diabetes mellitus is managed with Tradjenta 5 mg daily, metformin 1000 mg twice daily, and Farxiga 10 mg daily. He has mild memory loss and has been on Aricept and Namenda with no reported drug side effects.    His calcium levels run slightly high and potassium is also mildly elevated, but he is asymptomatic. He has a history of prostate cancer, with his last PSA being normal and undetectable. He is overweight and has been trying to follow a diet.    SOCIAL HISTORY  Exercise: Physically quite active  Diet: Trying to follow diet      Past Surgical History:   Procedure Laterality Date    APPENDECTOMY  1959    CARDIAC CATHETERIZATION  08/2010    CARDIAC SURGERY  2010    CARDIOVASCULAR STRESS TEST  11/2014     CARDIOVASCULAR STRESS TEST  2014    COLONOSCOPY  2014    COLONOSCOPY  2014    CORONARY STENT PLACEMENT      CORONARY STENT PLACEMENT      ECHO - CONVERTED  2011    EYE SURGERY      OTHER SURGICAL HISTORY      Cath Stent Placement  and .    PROSTATECTOMY      Radical     Family History   Problem Relation Age of Onset    Alzheimer's disease Mother     Dementia Mother     Diabetes Father     Heart attack Father     Asthma Father     Heart disease Father     No Known Problems Brother     Coronary artery disease Other     Cancer Other         Colon    Cancer Sister      Past Medical History:   Diagnosis Date    Allergic rhinitis     Anxiety     Asthma     Asthma, extrinsic     CAD (coronary artery disease)     Caffeine use     2 Cups Daily.     COPD (chronic obstructive pulmonary disease)     Depression     Diabetes mellitus     GERD (gastroesophageal reflux disease)     Hyperlipidemia     Hypertension     Lung nodule     Myocardial infarction     Obesity     Prostate cancer     Prostate cancer     Pulmonary arterial hypertension      Patient Active Problem List   Diagnosis    CAD (coronary artery disease) status post myocardial infarction and PCI to the RCA in     Diabetes mellitus    Mixed hyperlipidemia    Depression    Essential hypertension    Obesity    Bradycardia, sinus    Prostate CA    Memory loss    Environmental allergies    Vitamin D deficiency    Shortness of breath    Simple chronic bronchitis    Multiple pulmonary nodules    Tick bite of left thigh    Hyperkalemia    Hypercalcemia    Chronic obstructive pulmonary disease    Former smoker       Social History     Tobacco Use    Smoking status: Former     Current packs/day: 0.00     Average packs/day: 1 pack/day for 15.0 years (15.0 ttl pk-yrs)     Types: Cigarettes, Pipe, Cigars     Start date: 1981     Quit date: 1991     Years since quittin.1     Passive exposure: Past    Smokeless tobacco: Never  "  Vaping Use    Vaping status: Never Used   Substance Use Topics    Alcohol use: No    Drug use: No       Allergies   Allergen Reactions    Penicillins Other (See Comments)     Yrs ago went \"out of it\"      Sulfa Antibiotics Hives       Current Outpatient Medications on File Prior to Visit   Medication Sig    albuterol sulfate  (90 Base) MCG/ACT inhaler Inhale 2 puffs Every 4 (Four) Hours As Needed for Wheezing or Shortness of Air.    aspirin 325 MG tablet Take 1 tablet by mouth Daily.    Blood Glucose Monitoring Suppl (Blood Glucose Monitor System) w/Device kit 1 each Daily.    Budeson-Glycopyrrol-Formoterol (Breztri Aerosphere) 160-9-4.8 MCG/ACT aerosol inhaler Inhale 2 puffs 2 (Two) Times a Day. Rinse mouth out after each use to avoid oral thrush.    cholecalciferol (VITAMIN D3) 1000 UNITS tablet Take 1 tablet by mouth Daily.    dapagliflozin Propanediol (Farxiga) 10 MG tablet Take 10 mg by mouth Daily.    donepezil (ARICEPT) 10 MG tablet TAKE 1 TABLET BY MOUTH EVERY NIGHT    glucose blood (OneTouch Ultra) test strip TEST EVERY DAY AS NEEDED    imipramine (TOFRANIL) 50 MG tablet Take 1 tablet by mouth Every Night.    Lancets misc Use 1 stick Daily.    linagliptin (TRADJENTA) 5 MG tablet tablet Take 1 tablet by mouth Daily.    memantine (NAMENDA) 10 MG tablet TAKE 1 TABLET BY MOUTH TWICE A DAY    metFORMIN ER (GLUCOPHAGE-XR) 500 MG 24 hr tablet TAKE 2 TABLETS BY MOUTH DAILY WITH BREAKFAST    nitroglycerin (NITROSTAT) 0.4 MG SL tablet 1 under the tongue as needed for angina, may repeat q5mins for up three doses    Omeprazole Magnesium 10 MG pack Take 20 mg by mouth At Night As Needed (as needed for heartburn).    Orgovyx 120 MG tablet tablet Take 1 tablet by mouth Daily.    rosuvastatin (CRESTOR) 20 MG tablet TAKE 1 TABLET BY MOUTH DAILY    Trelegy Ellipta 100-62.5-25 MCG/ACT inhaler INHALE 1 PUFF BY MOUTH DAILY    vitamin B-12 (CYANOCOBALAMIN) 1000 MCG tablet Take 1 tablet by mouth Daily.    [DISCONTINUED] " lisinopril (PRINIVIL,ZESTRIL) 10 MG tablet TAKE 1 TABLET BY MOUTH TWICE A DAY     No current facility-administered medications on file prior to visit.     (Not in a hospital admission)      Results for orders placed during the hospital encounter of 08/18/20    Adult Transthoracic Echo Complete W/ Cont if Necessary Per Protocol    Interpretation Summary  · Normal left ventricular cavity size with mild concentric hypertrophy.  · Left ventricular systolic function is normal. Estimated EF appears to be in the range of 56 - 60%.  · Left ventricular diastolic dysfunction is noted (grade I) consistent with impaired relaxation.  · No significant valvular heart disease  · There is no evidence of pericardial effusion.    Results for orders placed during the hospital encounter of 05/03/24    Stress Test With Myocardial Perfusion One Day    Interpretation Summary    A stress test was performed following the [facility] protocol.    Resting EKG showed sinus rhythm at a rate of 80 bpm.  Small Q waves in leads II, III, aVF, probably old inferior wall myocardial infarction.    Patient exercised for 6 minutes and 36 seconds, predicted target heart rate was not achieved.  However 84% of predicted maximal heart rate was achieved.  Workload at peak exercise was 8.7 METS.  Patient denied any chest discomfort during exercise or recovery.    Blood pressure response was appropriate.  Heart rate response was sluggish.    ST segments did not show any diagnostic changes.  Occasional PVCs were noted during recovery.    Findings consistent with a normal ECG stress test up to 84% of predicted maximal heart rate.    Left ventricular ejection fraction is normal (Calculated EF = 70%).    Exercise images show a small fixed defect involving the apical inferior and apical lateral segment of the left ventricle.  Rest images do not show any reversibility.  Actually stress images are better than rest images.    TID 0.84.    Myocardial perfusion imaging  "indicates a small-sized infarct located in the apex with no significant ischemia noted.    This is a low risk study.    There is no prior study available for comparison.          The following portions of the patient's history were reviewed and updated as appropriate: allergies, current medications, past family history, past medical history, past social history, past surgical history and problem list.    Review of Systems   Constitutional: Negative.   HENT: Negative.  Negative for congestion.    Eyes: Negative.    Cardiovascular: Negative.  Negative for chest pain, cyanosis, dyspnea on exertion, irregular heartbeat, leg swelling, near-syncope, orthopnea, palpitations, paroxysmal nocturnal dyspnea and syncope.   Respiratory: Negative.  Negative for shortness of breath.    Hematologic/Lymphatic: Negative.    Musculoskeletal: Negative.    Gastrointestinal: Negative.    Neurological: Negative.  Negative for headaches.          Objective:     /74 (BP Location: Left arm, Patient Position: Sitting, Cuff Size: Adult)   Pulse 57   Ht 175.3 cm (69\")   Wt 94.3 kg (208 lb)   SpO2 98%   BMI 30.72 kg/m²   Pulmonary:      Effort: Pulmonary effort is normal.      Breath sounds: Normal breath sounds. No stridor. No wheezing. No rhonchi. No rales.   Cardiovascular:      PMI at left midclavicular line. Normal rate. Regular rhythm. Normal S1. Normal S2.       Murmurs: There is no murmur.      No gallop.  No click. No rub.   Pulses:     Intact distal pulses.   Edema:     Peripheral edema absent.       Physical Exam  General: Overweight, no acute distress      Lab Review      Lab Results   Component Value Date    WBC 9.12 06/10/2025    HGB 15.4 06/10/2025    HCT 45.6 06/10/2025     06/10/2025     ntains abnormal data COMPREHENSIVE METABOLIC PANEL  Order: [id number]  Component  Ref Range & Units 9 d ago   Glucose  74 - 99 mg/dL 127 High    BUN  8 - 23 mg/dL 16   Creatinine  0.70 - 1.20 mg/dL 1.01   BUN/Creatinine Ratio 16 " "  Sodium  136 - 145 mmol/L 140   Potassium  3.6 - 4.9 mmol/L 5.1 High    Chloride  97 - 107 mmol/L 106   Total CO2  22 - 29 mmol/L 25   Anion Gap  6 - 16 mmol/L 9   Calcium  8.9 - 10.2 mg/dL 10.8 High    Total Protein  6.3 - 7.9 g/dL 7.1   Albumin  3.5 - 5.2 g/dL 4.5   AST (SGOT)  10 - 50 U/L 18   ALT (SGPT)  10 - 50 U/L 19   Alkaline Phosphatase  40 - 115 U/L 66   Total Bilirubin  0.2 - 1.1 mg/dL 0.4   eGFRcr  mL/min/1.73m*2 76.6       No results found for: \"PROBNP\"            Procedures    Results  Labs   - Calcium: Slightly high   - Potassium: Mildly elevated   - PSA: Normal, undetectable level    Diagnostic Testing   - Stress test: 06/2024, Did not show any significant exercise-induced myocardial ischemia     I personally viewed and interpreted the patient's LAB data         Assessment:     1. Coronary artery disease involving native coronary artery of native heart without angina pectoris    2. Mixed hyperlipidemia    3. Essential hypertension    4. Type 2 diabetes mellitus without complication, without long-term current use of insulin    5. Memory loss    6. Vitamin D deficiency    7. Hypercalcemia    8. Bradycardia, sinus        Assessment & Plan  1. Coronary artery disease, status post myocardial infarction and PCI to RCA.  He is currently asymptomatic. The importance of aggressive risk factor modification was emphasized. He will continue his regimen of aspirin and statin therapy with Crestor 20 mg daily. Beta blocker therapy is not being administered due to bradycardia.    2. Hypertension.  His blood pressure is well-controlled. He will maintain his current dosage of lisinopril 5 mg daily.    3. Hyperlipidemia.  He will continue with Crestor 20 mg daily. Laboratory tests will be conducted during the next visit.    4. Diabetes mellitus type 2.  The importance of a healthy lifestyle and weight loss was discussed. He will continue with his current medications: Tradjenta 5 mg daily, metformin 1000 mg twice " daily, and Farxiga 10 mg daily.    5. Memory loss.  His condition is stable. He will continue with Aricept and Namenda.    6. Hypercalcemia.  A parathyroid level test will be conducted.    7. Elevated potassium levels.  He was advised to avoid bananas and oranges, and to reduce his intake of tomatoes.   Better and diabetic control.  Potassium elevation might also be related to lisinopril.     if the potassium levels remain elevated, Lokelma will be added to his treatment plan or lisinopril will be discontinued.    8. Prostate cancer.  His last PSA was normal with undetectable levels.    Follow-up scheduled.            No follow-ups on file.

## 2025-07-24 ENCOUNTER — HOSPITAL ENCOUNTER (OUTPATIENT)
Dept: RESPIRATORY THERAPY | Facility: HOSPITAL | Age: 80
Discharge: HOME | End: 2025-07-24
Payer: MEDICARE

## 2025-07-24 DIAGNOSIS — J44.9 CHRONIC OBSTRUCTIVE PULMONARY DISEASE, UNSPECIFIED COPD TYPE: ICD-10-CM

## 2025-07-24 PROCEDURE — 94729 DIFFUSING CAPACITY: CPT

## 2025-07-24 PROCEDURE — 94060 EVALUATION OF WHEEZING: CPT

## 2025-07-24 PROCEDURE — 94640 AIRWAY INHALATION TREATMENT: CPT

## 2025-07-24 PROCEDURE — 94726 PLETHYSMOGRAPHY LUNG VOLUMES: CPT

## 2025-07-24 RX ORDER — ALBUTEROL SULFATE 0.83 MG/ML
2.5 SOLUTION RESPIRATORY (INHALATION) ONCE
Status: COMPLETED | OUTPATIENT
Start: 2025-07-24 | End: 2025-07-24

## 2025-07-24 RX ADMIN — ALBUTEROL SULFATE 2.5 MG: 2.5 SOLUTION RESPIRATORY (INHALATION) at 14:09

## 2025-08-04 RX ORDER — METFORMIN HYDROCHLORIDE 500 MG/1
1000 TABLET, EXTENDED RELEASE ORAL
Qty: 180 TABLET | Refills: 0 | Status: SHIPPED | OUTPATIENT
Start: 2025-08-04

## 2025-08-06 ENCOUNTER — TELEPHONE (OUTPATIENT)
Dept: PULMONOLOGY | Facility: CLINIC | Age: 80
End: 2025-08-06
Payer: MEDICARE

## 2025-08-07 RX ORDER — DONEPEZIL HYDROCHLORIDE 10 MG/1
10 TABLET, FILM COATED ORAL NIGHTLY
Qty: 90 TABLET | Refills: 1 | Status: SHIPPED | OUTPATIENT
Start: 2025-08-07